# Patient Record
Sex: MALE | Race: WHITE | NOT HISPANIC OR LATINO | Employment: FULL TIME | URBAN - METROPOLITAN AREA
[De-identification: names, ages, dates, MRNs, and addresses within clinical notes are randomized per-mention and may not be internally consistent; named-entity substitution may affect disease eponyms.]

---

## 2018-03-11 ENCOUNTER — RESOLUTE PROFESSIONAL BILLING HOSPITAL PROF FEE (OUTPATIENT)
Dept: HOSPITALIST | Facility: MEDICAL CENTER | Age: 58
End: 2018-03-11
Payer: COMMERCIAL

## 2018-03-11 ENCOUNTER — HOSPITAL ENCOUNTER (OUTPATIENT)
Facility: MEDICAL CENTER | Age: 58
DRG: 418 | End: 2018-03-11
Admitting: HOSPITALIST
Payer: COMMERCIAL

## 2018-03-11 ENCOUNTER — HOSPITAL ENCOUNTER (INPATIENT)
Facility: MEDICAL CENTER | Age: 58
LOS: 3 days | DRG: 418 | End: 2018-03-14
Attending: HOSPITALIST | Admitting: HOSPITALIST
Payer: COMMERCIAL

## 2018-03-11 DIAGNOSIS — G89.18 POSTOPERATIVE PAIN: ICD-10-CM

## 2018-03-11 DIAGNOSIS — K83.1 OBSTRUCTIVE JAUNDICE: Primary | ICD-10-CM

## 2018-03-11 PROBLEM — R10.9 ABDOMINAL PAIN: Status: ACTIVE | Noted: 2018-03-11

## 2018-03-11 PROBLEM — E78.5 DYSLIPIDEMIA: Status: ACTIVE | Noted: 2018-03-11

## 2018-03-11 PROBLEM — E87.6 HYPOKALEMIA: Status: ACTIVE | Noted: 2018-03-11

## 2018-03-11 PROBLEM — F10.20 ALCOHOL DEPENDENCE (HCC): Status: ACTIVE | Noted: 2018-03-11

## 2018-03-11 PROBLEM — I10 HTN (HYPERTENSION): Status: ACTIVE | Noted: 2018-03-11

## 2018-03-11 LAB
ALBUMIN SERPL BCP-MCNC: 3.8 G/DL (ref 3.2–4.9)
ALBUMIN/GLOB SERPL: 1.2 G/DL
ALP SERPL-CCNC: 327 U/L (ref 30–99)
ALT SERPL-CCNC: 235 U/L (ref 2–50)
ANION GAP SERPL CALC-SCNC: 9 MMOL/L (ref 0–11.9)
APTT PPP: 26.4 SEC (ref 24.7–36)
AST SERPL-CCNC: 166 U/L (ref 12–45)
BASOPHILS # BLD AUTO: 0.3 % (ref 0–1.8)
BASOPHILS # BLD: 0.03 K/UL (ref 0–0.12)
BILIRUB SERPL-MCNC: 11 MG/DL (ref 0.1–1.5)
BUN SERPL-MCNC: 12 MG/DL (ref 8–22)
CALCIUM SERPL-MCNC: 9.6 MG/DL (ref 8.5–10.5)
CHLORIDE SERPL-SCNC: 93 MMOL/L (ref 96–112)
CO2 SERPL-SCNC: 32 MMOL/L (ref 20–33)
CREAT SERPL-MCNC: 1.04 MG/DL (ref 0.5–1.4)
EOSINOPHIL # BLD AUTO: 0.11 K/UL (ref 0–0.51)
EOSINOPHIL NFR BLD: 1.1 % (ref 0–6.9)
ERYTHROCYTE [DISTWIDTH] IN BLOOD BY AUTOMATED COUNT: 48.7 FL (ref 35.9–50)
GLOBULIN SER CALC-MCNC: 3.3 G/DL (ref 1.9–3.5)
GLUCOSE SERPL-MCNC: 111 MG/DL (ref 65–99)
HCT VFR BLD AUTO: 44.8 % (ref 42–52)
HGB BLD-MCNC: 15.3 G/DL (ref 14–18)
IMM GRANULOCYTES # BLD AUTO: 0.02 K/UL (ref 0–0.11)
IMM GRANULOCYTES NFR BLD AUTO: 0.2 % (ref 0–0.9)
INR PPP: 1.1 (ref 0.87–1.13)
LIPASE SERPL-CCNC: 25 U/L (ref 11–82)
LYMPHOCYTES # BLD AUTO: 0.79 K/UL (ref 1–4.8)
LYMPHOCYTES NFR BLD: 8.3 % (ref 22–41)
MCH RBC QN AUTO: 32.1 PG (ref 27–33)
MCHC RBC AUTO-ENTMCNC: 34.2 G/DL (ref 33.7–35.3)
MCV RBC AUTO: 94.1 FL (ref 81.4–97.8)
MONOCYTES # BLD AUTO: 0.66 K/UL (ref 0–0.85)
MONOCYTES NFR BLD AUTO: 6.9 % (ref 0–13.4)
NEUTROPHILS # BLD AUTO: 7.96 K/UL (ref 1.82–7.42)
NEUTROPHILS NFR BLD: 83.2 % (ref 44–72)
NRBC # BLD AUTO: 0 K/UL
NRBC BLD-RTO: 0 /100 WBC
PLATELET # BLD AUTO: 95 K/UL (ref 164–446)
PMV BLD AUTO: 11.1 FL (ref 9–12.9)
POTASSIUM SERPL-SCNC: 3.1 MMOL/L (ref 3.6–5.5)
PROT SERPL-MCNC: 7.1 G/DL (ref 6–8.2)
PROTHROMBIN TIME: 13.9 SEC (ref 12–14.6)
RBC # BLD AUTO: 4.76 M/UL (ref 4.7–6.1)
SODIUM SERPL-SCNC: 134 MMOL/L (ref 135–145)
WBC # BLD AUTO: 9.6 K/UL (ref 4.8–10.8)

## 2018-03-11 PROCEDURE — 80053 COMPREHEN METABOLIC PANEL: CPT

## 2018-03-11 PROCEDURE — 770006 HCHG ROOM/CARE - MED/SURG/GYN SEMI*

## 2018-03-11 PROCEDURE — 700101 HCHG RX REV CODE 250: Performed by: HOSPITALIST

## 2018-03-11 PROCEDURE — 85730 THROMBOPLASTIN TIME PARTIAL: CPT

## 2018-03-11 PROCEDURE — 700105 HCHG RX REV CODE 258

## 2018-03-11 PROCEDURE — 83690 ASSAY OF LIPASE: CPT

## 2018-03-11 PROCEDURE — 85610 PROTHROMBIN TIME: CPT

## 2018-03-11 PROCEDURE — 36415 COLL VENOUS BLD VENIPUNCTURE: CPT

## 2018-03-11 PROCEDURE — 85025 COMPLETE CBC W/AUTO DIFF WBC: CPT

## 2018-03-11 PROCEDURE — 99221 1ST HOSP IP/OBS SF/LOW 40: CPT | Performed by: HOSPITALIST

## 2018-03-11 PROCEDURE — 700111 HCHG RX REV CODE 636 W/ 250 OVERRIDE (IP): Performed by: HOSPITALIST

## 2018-03-11 PROCEDURE — 700102 HCHG RX REV CODE 250 W/ 637 OVERRIDE(OP): Performed by: HOSPITALIST

## 2018-03-11 PROCEDURE — A9270 NON-COVERED ITEM OR SERVICE: HCPCS | Performed by: HOSPITALIST

## 2018-03-11 RX ORDER — ASPIRIN 81 MG/1
81 TABLET, CHEWABLE ORAL DAILY
Status: ON HOLD | COMMUNITY
End: 2018-03-11

## 2018-03-11 RX ORDER — ASPIRIN 81 MG/1
81 TABLET, CHEWABLE ORAL DAILY
COMMUNITY
End: 2022-01-31 | Stop reason: SDUPTHER

## 2018-03-11 RX ORDER — PROMETHAZINE HYDROCHLORIDE 25 MG/1
12.5-25 TABLET ORAL EVERY 4 HOURS PRN
Status: DISCONTINUED | OUTPATIENT
Start: 2018-03-11 | End: 2018-03-14 | Stop reason: HOSPADM

## 2018-03-11 RX ORDER — POLYETHYLENE GLYCOL 3350 17 G/17G
1 POWDER, FOR SOLUTION ORAL
Status: DISCONTINUED | OUTPATIENT
Start: 2018-03-11 | End: 2018-03-14 | Stop reason: HOSPADM

## 2018-03-11 RX ORDER — AMOXICILLIN 250 MG
2 CAPSULE ORAL 2 TIMES DAILY
Status: DISCONTINUED | OUTPATIENT
Start: 2018-03-11 | End: 2018-03-14 | Stop reason: HOSPADM

## 2018-03-11 RX ORDER — PROMETHAZINE HYDROCHLORIDE 25 MG/1
12.5-25 SUPPOSITORY RECTAL EVERY 4 HOURS PRN
Status: DISCONTINUED | OUTPATIENT
Start: 2018-03-11 | End: 2018-03-14 | Stop reason: HOSPADM

## 2018-03-11 RX ORDER — ONDANSETRON 4 MG/1
4 TABLET, ORALLY DISINTEGRATING ORAL EVERY 4 HOURS PRN
Status: DISCONTINUED | OUTPATIENT
Start: 2018-03-11 | End: 2018-03-14 | Stop reason: HOSPADM

## 2018-03-11 RX ORDER — CLONIDINE HYDROCHLORIDE 0.1 MG/1
0.2 TABLET ORAL 2 TIMES DAILY
Status: DISCONTINUED | OUTPATIENT
Start: 2018-03-11 | End: 2018-03-14 | Stop reason: HOSPADM

## 2018-03-11 RX ORDER — DILTIAZEM HYDROCHLORIDE 120 MG/1
240 CAPSULE, COATED, EXTENDED RELEASE ORAL DAILY
Status: DISCONTINUED | OUTPATIENT
Start: 2018-03-12 | End: 2018-03-14 | Stop reason: HOSPADM

## 2018-03-11 RX ORDER — OXYCODONE HYDROCHLORIDE 5 MG/1
5 TABLET ORAL
Status: DISCONTINUED | OUTPATIENT
Start: 2018-03-11 | End: 2018-03-14 | Stop reason: HOSPADM

## 2018-03-11 RX ORDER — ATORVASTATIN CALCIUM 20 MG/1
20 TABLET, FILM COATED ORAL NIGHTLY
COMMUNITY

## 2018-03-11 RX ORDER — DEXTROSE AND SODIUM CHLORIDE 5; .9 G/100ML; G/100ML
INJECTION, SOLUTION INTRAVENOUS
Status: ACTIVE
Start: 2018-03-11 | End: 2018-03-11

## 2018-03-11 RX ORDER — CHLORTHALIDONE 25 MG/1
25 TABLET ORAL DAILY
COMMUNITY

## 2018-03-11 RX ORDER — ACETAMINOPHEN 325 MG/1
650 TABLET ORAL EVERY 6 HOURS PRN
Status: DISCONTINUED | OUTPATIENT
Start: 2018-03-11 | End: 2018-03-14 | Stop reason: HOSPADM

## 2018-03-11 RX ORDER — METOPROLOL TARTRATE 100 MG/1
100 TABLET ORAL 2 TIMES DAILY
Status: DISCONTINUED | OUTPATIENT
Start: 2018-03-11 | End: 2018-03-14 | Stop reason: HOSPADM

## 2018-03-11 RX ORDER — ONDANSETRON 2 MG/ML
4 INJECTION INTRAMUSCULAR; INTRAVENOUS EVERY 4 HOURS PRN
Status: DISCONTINUED | OUTPATIENT
Start: 2018-03-11 | End: 2018-03-14 | Stop reason: HOSPADM

## 2018-03-11 RX ORDER — ATORVASTATIN CALCIUM 20 MG/1
20 TABLET, FILM COATED ORAL NIGHTLY
Status: DISCONTINUED | OUTPATIENT
Start: 2018-03-11 | End: 2018-03-14 | Stop reason: HOSPADM

## 2018-03-11 RX ORDER — OMEPRAZOLE 20 MG/1
20 CAPSULE, DELAYED RELEASE ORAL DAILY
COMMUNITY

## 2018-03-11 RX ORDER — LABETALOL HYDROCHLORIDE 5 MG/ML
10 INJECTION, SOLUTION INTRAVENOUS EVERY 4 HOURS PRN
Status: DISCONTINUED | OUTPATIENT
Start: 2018-03-11 | End: 2018-03-14 | Stop reason: HOSPADM

## 2018-03-11 RX ORDER — OXYCODONE HYDROCHLORIDE 5 MG/1
2.5 TABLET ORAL
Status: DISCONTINUED | OUTPATIENT
Start: 2018-03-11 | End: 2018-03-14 | Stop reason: HOSPADM

## 2018-03-11 RX ORDER — DILTIAZEM HYDROCHLORIDE 240 MG/1
240 CAPSULE, COATED, EXTENDED RELEASE ORAL DAILY
COMMUNITY

## 2018-03-11 RX ORDER — DEXTROSE AND SODIUM CHLORIDE 5; .9 G/100ML; G/100ML
INJECTION, SOLUTION INTRAVENOUS
Status: ACTIVE
Start: 2018-03-11 | End: 2018-03-12

## 2018-03-11 RX ORDER — BISACODYL 10 MG
10 SUPPOSITORY, RECTAL RECTAL
Status: DISCONTINUED | OUTPATIENT
Start: 2018-03-11 | End: 2018-03-14 | Stop reason: HOSPADM

## 2018-03-11 RX ORDER — CLONIDINE HYDROCHLORIDE 0.2 MG/1
0.2 TABLET ORAL 2 TIMES DAILY
COMMUNITY
End: 2022-01-13

## 2018-03-11 RX ORDER — SODIUM CHLORIDE AND POTASSIUM CHLORIDE 150; 900 MG/100ML; MG/100ML
INJECTION, SOLUTION INTRAVENOUS CONTINUOUS
Status: DISCONTINUED | OUTPATIENT
Start: 2018-03-11 | End: 2018-03-14 | Stop reason: HOSPADM

## 2018-03-11 RX ORDER — DEXTROSE AND SODIUM CHLORIDE 5; .45 G/100ML; G/100ML
INJECTION, SOLUTION INTRAVENOUS CONTINUOUS
Status: DISCONTINUED | OUTPATIENT
Start: 2018-03-11 | End: 2018-03-11

## 2018-03-11 RX ORDER — OMEPRAZOLE 20 MG/1
20 CAPSULE, DELAYED RELEASE ORAL DAILY
Status: DISCONTINUED | OUTPATIENT
Start: 2018-03-12 | End: 2018-03-14 | Stop reason: HOSPADM

## 2018-03-11 RX ORDER — METOPROLOL TARTRATE 100 MG/1
200 TABLET ORAL 2 TIMES DAILY
COMMUNITY

## 2018-03-11 RX ADMIN — POTASSIUM CHLORIDE AND SODIUM CHLORIDE: 900; 150 INJECTION, SOLUTION INTRAVENOUS at 12:35

## 2018-03-11 RX ADMIN — OXYCODONE HYDROCHLORIDE 5 MG: 5 TABLET ORAL at 16:05

## 2018-03-11 RX ADMIN — HYDROMORPHONE HYDROCHLORIDE 0.25 MG: 10 INJECTION, SOLUTION INTRAMUSCULAR; INTRAVENOUS; SUBCUTANEOUS at 12:36

## 2018-03-11 RX ADMIN — HYDROMORPHONE HYDROCHLORIDE 0.25 MG: 10 INJECTION, SOLUTION INTRAMUSCULAR; INTRAVENOUS; SUBCUTANEOUS at 21:43

## 2018-03-11 RX ADMIN — CLONIDINE HYDROCHLORIDE 0.2 MG: 0.1 TABLET ORAL at 21:31

## 2018-03-11 RX ADMIN — CEFTRIAXONE 2 G: 2 INJECTION, POWDER, FOR SOLUTION INTRAMUSCULAR; INTRAVENOUS at 13:04

## 2018-03-11 RX ADMIN — METRONIDAZOLE 500 MG: 500 INJECTION, SOLUTION INTRAVENOUS at 14:00

## 2018-03-11 RX ADMIN — STANDARDIZED SENNA CONCENTRATE AND DOCUSATE SODIUM 2 TABLET: 8.6; 5 TABLET, FILM COATED ORAL at 21:31

## 2018-03-11 RX ADMIN — METRONIDAZOLE 500 MG: 500 INJECTION, SOLUTION INTRAVENOUS at 21:38

## 2018-03-11 ASSESSMENT — LIFESTYLE VARIABLES
HAVE YOU EVER FELT YOU SHOULD CUT DOWN ON YOUR DRINKING: YES
HAVE PEOPLE ANNOYED YOU BY CRITICIZING YOUR DRINKING: YES
EVER FELT BAD OR GUILTY ABOUT YOUR DRINKING: NO
CONSUMPTION TOTAL: POSITIVE
DOES PATIENT WANT TO STOP DRINKING: YES
EVER_SMOKED: NEVER
TOTAL SCORE: 3
HOW MANY TIMES IN THE PAST YEAR HAVE YOU HAD 5 OR MORE DRINKS IN A DAY: 10
TOTAL SCORE: 3
DOES PATIENT WANT TO TALK TO SOMEONE ABOUT QUITTING: NO
ON A TYPICAL DAY WHEN YOU DRINK ALCOHOL HOW MANY DRINKS DO YOU HAVE: 3
AVERAGE NUMBER OF DAYS PER WEEK YOU HAVE A DRINK CONTAINING ALCOHOL: 6
EVER HAD A DRINK FIRST THING IN THE MORNING TO STEADY YOUR NERVES TO GET RID OF A HANGOVER: YES
TOTAL SCORE: 3
ALCOHOL_USE: YES

## 2018-03-11 ASSESSMENT — PATIENT HEALTH QUESTIONNAIRE - PHQ9
SUM OF ALL RESPONSES TO PHQ9 QUESTIONS 1 AND 2: 0
1. LITTLE INTEREST OR PLEASURE IN DOING THINGS: NOT AT ALL
SUM OF ALL RESPONSES TO PHQ QUESTIONS 1-9: 0
2. FEELING DOWN, DEPRESSED, IRRITABLE, OR HOPELESS: NOT AT ALL

## 2018-03-11 ASSESSMENT — PAIN SCALES - GENERAL
PAINLEVEL_OUTOF10: 6
PAINLEVEL_OUTOF10: 8
PAINLEVEL_OUTOF10: 9
PAINLEVEL_OUTOF10: 8
PAINLEVEL_OUTOF10: 8
PAINLEVEL_OUTOF10: 7

## 2018-03-11 NOTE — PROGRESS NOTES
GI (full consultation note dictated)    Chief Complaint: Epigastric pain    Joshua is a 57 year old  from [place], [place] who complains of acute onset of epigastric pain radiating into the back, nausea, and shaking chills on 3/10/2018 at 2:30 am.  Despite the pain, he worked an 8 hour shift.  He had experienced 2 prior bouts of similar pain, once a month ago and another time 2 months ago, but unlike those episodes, this pain didn't resolve.  Therefore, he went to the ER.  (See details in consultation note).    Impression:  1. Epigastric pain, suspect due to obstructing CBD stone +/- cholecystitis.  2. Jaundice - suspect obstructing CBD stone.  3. Elevated liver enzymes, suspect acute related to CBD stone.  4. Dilated CBD - 11.5 mm per US and CT.  No tumors.  No pancreatic duct dilatation.  5. Thickened GB wall with sludge.  6. Downward trend in platelet count (125k to 95k).  7. Excess alcohol intake (three drinks with 6 oz of Vodka 6 days out of the week).  8. HTN.  9. Dyslipidemia.    Recommendations:  - Agree with antibiotics (metronidazole and ceftriaxone).  - Check INR.  - I will try to schedule an ERCP tomorrow by Dr. Victorino Luis.  Tentatively they gave me a time of 8 am, but that may change depending upon OR availability.

## 2018-03-11 NOTE — CONSULTS
DATE OF SERVICE:  2018    CHIEF COMPLAINT:  Abdominal pain.    HISTORY OF PRESENT ILLNESS:  The patient is a 57-year-old  from   [place], [place] who was feeling well until 03/10/2018 (Saturday night) at 2:30   a.m. when he awoke with acute onset of severe epigastric pain radiating to the   back with nausea and shaking chills.  Despite the pain, he worked in an   8-hour shift.  Unlike two prior bouts of pain, one a month ago and the other   one 2 months ago, this discomfort persisted.  He was trying not to go to the   hospital, but finally gave in and arrived there late last night.  He was found   to be jaundiced with abnormal liver enzymes.  He underwent an ultrasound of   the abdomen and was found to have a thickened gallbladder wall, sludge in the   gallbladder, and a dilated 11.5 mm common bile duct.  A CT scan of the abdomen   and pelvis revealed thickened gallbladder wall and 11.4 mm common bile duct,   but no findings of tumors, pancreatic ductal dilatation, or other pathology to   explain his pain.  They felt he probably had a common bile duct stone and   transferred him here so he could undergo ERCP.    PAST MEDICAL HISTORY:  Hypertension, dyslipidemia.    PAST SURGICAL HISTORY:  Bilateral cataract surgery.    ALLERGIES:  None known.    OUTPATIENT MEDICATIONS:  Aspirin 81 mg daily, Lipitor 20 mg daily,   chlorthalidone 25 mg daily, clonidine 0.2 mg b.i.d., Cardizem- mg daily,   metoprolol 100 mg twice daily and Prilosec 20 mg daily.    SOCIAL HISTORY:  He is  with grown children and works as a crane   .  He admits he drinks too much alcohol.  Six days out of the week, he   will have 3 drinks, but they consist of about 6 ounces of vodka.  He does not   smoke cigarettes, has no history of IV drug use or tattoos.    FAMILY HISTORY:  His father  of a heart attack.  His mother is still   alive, no cancer.    REVIEW OF SYSTEMS:  As per history of present illness, otherwise  comprehensive   review of system was negative.    PHYSICAL EXAMINATION:  VITAL SIGNS:  His temperature is 36.9, pulse 72, respirations 16, blood   pressure 141/86, oxygen saturation 94%.  HEENT:  Mild rubor of his cheeks.  Sclerae are anicteric.  Extraocular   movements intact.  Oral mucosa moist.  Mallampati score 1.  NECK:  No thyromegaly or adenopathy.  LUNGS:  Clear to auscultation.  HEART:  Regular rate and rhythm.  S1, S2.  No murmurs.  ABDOMEN:  Mildly obese with normal bowel sounds.  He has moderate epigastric   tenderness, without guarding or rebound.  I did not appreciate any   hepatosplenomegaly or ascites.  EXTREMITIES:  No edema.  NEUROLOGIC:  Alert and oriented.    LABORATORY DATA:  In [place], his labs showed white count elevation of 12,100,   hemoglobin was 14.7, hematocrit 41.9, MCV 92.3, platelet count of 125,000.    Chemistry panel normal except for sodium 133, potassium 3.1, chloride 94,   glucose 133, total bilirubin 9.2, alkaline phosphatase 408, , ,   lipase was normal at 89.  Urinalysis had 3+ bilirubin.    Here, his white count is down to 9600, hemoglobin and hematocrit are stable,   and his platelet count 95,000.  The chemistry panel now shows his total   bilirubin up to 11, alkaline phosphatase decreased to 327, AST is 166, ALT is   235.  Sodium was 134, potassium 3.1, chloride 93, glucose 111.    IMAGING:  As per history of present illness.    IMPRESSION:  1.  Acute epigastric pain.  Given findings, I suspect he has a distal   obstructing common bile duct stone with possible secondary cholecystitis or   cholangitis.  2.  Jaundice, suspect obstructing common bile duct stone.  3.  Elevated liver enzymes, probably due to acute biliary obstruction.  4.  Dilated common bile duct per ultrasound and the CT scan with no evidence   of tumors or pancreatic ductal dilatation.  5.  Thickened gallbladder wall with sludge.  6.  Downward trend in platelet count (125,000-95,000).  7.  Excess  alcohol intake.  8.  Hypertension.  9.  Dyslipidemia.    The patient tells me that previously he had normal liver tests, but his red   cells were a little bit large and his doctor thought he may drink too much.    However, this acute presentation is most consistent with acute obstruction of   the biliary tree by a common bile duct stone.  Given the rising bilirubin, I   think we should go directly to ERCP.  I do not think MRCP would add to the   diagnosis and might delay therapy.    RECOMMENDATIONS:  1.  Agree with antibiotics (metronidazole, ceftriaxone)  2.  Check INR and if elevated, give vitamin K.  3.  I will schedule an ERCP with possible sphincterotomy and stone extraction   by Dr. Victorino Luis tomorrow.  The OR staff told me 8:00 a.m. was available,   but that will be confirmed in the morning.  I have explained the procedure,   risks, benefits, and alternatives to the patient.  He appears to understand   and wishes to proceed as recommended.    Thank you for allowing me to participate in his care.       ____________________________________     PERLITA KO MD    CMS / EMILY    DD:  03/11/2018 13:25:31  DT:  03/11/2018 16:37:00    D#:  [id number]  Job#:  [id number]

## 2018-03-11 NOTE — PROGRESS NOTES
"[facility] [place] [place] and in the ER under the care of Dr. Veloz.  Pt has a dx of biliary obstruction and common bile duct obstruction.  Dr. Anand, [facility], is the accepting physician.  Pt will be have an Inpatient status and is assigned [facility] room [id number].    No ETA as of yet, as the room has not been cleaned.  When room is cleaned, the [facility] will call [facility] at [phone], and let them know the room is ready.  Pt should be arriving by air.    When pt arrives, the RN should release the \"Signed and Held ADT Order\" and call the on-call admitting physician for orders.  "

## 2018-03-11 NOTE — CARE PLAN
Problem: Safety  Goal: Will remain free from injury  Outcome: PROGRESSING AS EXPECTED  Patient oriented to the unit, educated on use of call light    Problem: Pain Management  Goal: Pain level will decrease to patient's comfort goal  Outcome: PROGRESSING AS EXPECTED  Medicated per MAR for pain of 8/10

## 2018-03-11 NOTE — PROGRESS NOTES
Med rec complete per patient's wife with a list.  Allergies reviewed and updated.  No outpatient antibiotics in the last 30 days.

## 2018-03-11 NOTE — PROGRESS NOTES
Assumed care of patient from [facility].  Patient is alert and oriented times 4, states pain of 7/10, will medicate per MAR.  PIV in the RAC, patent.  Currently on 2L oxygen with saturations in the mid 90s.  Standby assist, demonstrates steady gait, no assistance needed.  Skin intact.  Admitting hospitalist notified of patient arrival, awaiting new orders.  POC discussed for the day, patient oriented to the unit.  Bed is locked and in the lowest position, call light is within reach.  All needs are met at this time, hourly rounding is in place.

## 2018-03-11 NOTE — H&P
CHIEF COMPLAINT:  Abdominal pain.    HISTORY OF PRESENT ILLNESS:  Patient is a 57-year-old male who started to   experience abdominal pain about 2 days ago.  The pain was moderate to severe   to sharp.  It was in his mid and right upper quadrant, radiating to his back.    It was associated with recurrent episodes of nausea and vomiting.  He also   had some intermittent chills.  No documented fever.  He noted dark colored   urine.  He presented to his local emergency room in [place] where he was noted to   have an elevated bilirubin and dilated common bile duct and was transferred   to our facility for higher level of care.  He denies any chest pain or   shortness of breath.  No diarrhea.  No melena or rectal bleeding.  He drinks   about 3 alcoholic beverages daily.  He has a history of hypertension and   dyslipidemia, but no other chronic illnesses.    REVIEW OF SYSTEMS:  As above, otherwise reviewed and negative.    PAST MEDICAL HISTORY:  Significant for:  1.  Hypertension.  2.  Dyslipidemia.    PAST SURGICAL HISTORY:  Cataract surgery.    SOCIAL HISTORY:  He does not smoke.  He drinks 3 alcoholic beverages, 6 days a   week.  He denies illicit drug use.    FAMILY HISTORY:  Positive for cardiovascular disease.    HOME MEDICATIONS:  Aspirin 81 mg daily, Lipitor 20 mg every evening,   chlorthalidone 25 mg daily, clonidine 0.2 mg b.i.d., Cardizem- mg daily,   metoprolol 100 mg twice a day, Prilosec 20 mg.    PHYSICAL EXAMINATION:  GENERAL:  He is alert, oriented x3.  VITAL SIGNS:  He was afebrile.  Blood pressure is 150/92, pulse is 62,   respiratory rate is 18, pulse oximetry 98% on 3 liters.  HEAD AND NECK:  Pupils equal.  Supple neck.  No jugular venous distention.    Oropharynx is clear.  No cervical lymphadenopathy.  HEART:  Regular rate and rhythm, normal S1, S2.  No murmurs, rubs, or gallops.  LUNGS:  Clear with symmetric air entry bilaterally.  No chest wall tenderness.  ABDOMEN:  Soft, mildly distended.  He  has right upper and epigastric   tenderness.  No guarding or rebound.  Bowel sounds are positive.  No   hepatosplenomegaly.  EXTREMITIES:  No edema, no clubbing, no cyanosis.  NEUROLOGIC:  No focal deficits.  SKIN:  He is jaundiced, no rash.    DIAGNOSTICS:  Labs at the outlying facility, white blood cell count 12.1,   hemoglobin 14.7, hematocrit 41.9, platelet count 125.  Sodium 133, potassium   3.1, chloride 94, bicarbonate 30, glucose 130, BUN 14, creatinine 0.9, AST was   279, calcium 8.8, albumin 3.1, protein 7.5, globulin 4.4, total bilirubin was   9.2, alkaline phosphatase 408.  ALT was 346, lipase 89.  Urinalysis reveals   1-3 white blood cells, negative red blood cells, negative nitrites,   urobilinogen was 4.  CT of the abdomen and pelvis revealed gallbladder wall   thickening, correlate for cholecystitis.  The common bile duct is enlarged,   measuring 11.4 mm.    Gallbladder ultrasound revealed gallbladder containing sludge, gallbladder   wall thickening, measuring up to 4.9 mm.  Sonographic Liang sign is positive,   correlate for acalculous cholecystitis.  Common bile duct is enlarged,   measuring up to 11.6 mm.    ASSESSMENT AND PLAN:  1.  Abdominal pain with dilated common bile duct and elevated bilirubin   concerning for choledocholithiasis given associated pain and acute onset,   although no stone was seen on the ultrasound.  2.  Possible early cholangitis given leukocytosis and chills.  3.  Hypertension.  4.  Hypokalemia.  5.  Alcohol dependence.    PLAN:  The patient will be admitted.  He will be started on IV fluids for   hydration.  We will replete his potassium with his maintenance fluids.    We will keep him n.p.o.    We will start him on IV ceftriaxone and Flagyl.    We will recheck his LFTs and CBC and lipase.    We will consult GI.  I spoke with Dr. Miller who will see him for evaluation   for ERCP.    We will use SCDs for DVT prophylaxis at this time as the patient will likely   require  ERCP.    We will continue with his Lipitor.    We will continue with his clonidine, diltiazem, and metoprolol, but we will   hold off on his chlorthalidone at this time.    We will monitor his blood pressure and adjust accordingly.    Patient will likely require more than 2 midnights stay for treatment of his   medical condition.    Patient was counseled on restricting alcohol use.    We will monitor him for any signs of withdrawal.       ____________________________________     MD ALEJANDRA GUERRA / NTS    DD:  03/11/2018 12:14:41  DT:  03/11/2018 12:53:22    D#:  [id number]  Job#:  [id number]

## 2018-03-12 ENCOUNTER — APPOINTMENT (OUTPATIENT)
Dept: RADIOLOGY | Facility: MEDICAL CENTER | Age: 58
DRG: 418 | End: 2018-03-12
Attending: INTERNAL MEDICINE
Payer: COMMERCIAL

## 2018-03-12 PROBLEM — G89.18 POSTOPERATIVE PAIN: Status: ACTIVE | Noted: 2018-03-12

## 2018-03-12 PROBLEM — R73.9 HYPERGLYCEMIA: Status: ACTIVE | Noted: 2018-03-12

## 2018-03-12 PROBLEM — K83.1 OBSTRUCTIVE JAUNDICE: Status: RESOLVED | Noted: 2018-03-11 | Resolved: 2018-03-12

## 2018-03-12 LAB
ALBUMIN SERPL BCP-MCNC: 3.5 G/DL (ref 3.2–4.9)
ALBUMIN/GLOB SERPL: 1.2 G/DL
ALP SERPL-CCNC: 270 U/L (ref 30–99)
ALT SERPL-CCNC: 161 U/L (ref 2–50)
ANION GAP SERPL CALC-SCNC: 8 MMOL/L (ref 0–11.9)
AST SERPL-CCNC: 94 U/L (ref 12–45)
BASOPHILS # BLD AUTO: 0.4 % (ref 0–1.8)
BASOPHILS # BLD: 0.04 K/UL (ref 0–0.12)
BILIRUB SERPL-MCNC: 9 MG/DL (ref 0.1–1.5)
BUN SERPL-MCNC: 10 MG/DL (ref 8–22)
CALCIUM SERPL-MCNC: 9 MG/DL (ref 8.5–10.5)
CHLORIDE SERPL-SCNC: 95 MMOL/L (ref 96–112)
CO2 SERPL-SCNC: 32 MMOL/L (ref 20–33)
CREAT SERPL-MCNC: 0.88 MG/DL (ref 0.5–1.4)
EOSINOPHIL # BLD AUTO: 0.05 K/UL (ref 0–0.51)
EOSINOPHIL NFR BLD: 0.5 % (ref 0–6.9)
ERYTHROCYTE [DISTWIDTH] IN BLOOD BY AUTOMATED COUNT: 49.1 FL (ref 35.9–50)
GLOBULIN SER CALC-MCNC: 3 G/DL (ref 1.9–3.5)
GLUCOSE SERPL-MCNC: 137 MG/DL (ref 65–99)
HCT VFR BLD AUTO: 41.1 % (ref 42–52)
HGB BLD-MCNC: 13.9 G/DL (ref 14–18)
IMM GRANULOCYTES # BLD AUTO: 0.05 K/UL (ref 0–0.11)
IMM GRANULOCYTES NFR BLD AUTO: 0.5 % (ref 0–0.9)
LYMPHOCYTES # BLD AUTO: 0.77 K/UL (ref 1–4.8)
LYMPHOCYTES NFR BLD: 7.7 % (ref 22–41)
MAGNESIUM SERPL-MCNC: 1.8 MG/DL (ref 1.5–2.5)
MCH RBC QN AUTO: 32 PG (ref 27–33)
MCHC RBC AUTO-ENTMCNC: 33.8 G/DL (ref 33.7–35.3)
MCV RBC AUTO: 94.7 FL (ref 81.4–97.8)
MONOCYTES # BLD AUTO: 0.66 K/UL (ref 0–0.85)
MONOCYTES NFR BLD AUTO: 6.6 % (ref 0–13.4)
NEUTROPHILS # BLD AUTO: 8.46 K/UL (ref 1.82–7.42)
NEUTROPHILS NFR BLD: 84.3 % (ref 44–72)
NRBC # BLD AUTO: 0 K/UL
NRBC BLD-RTO: 0 /100 WBC
PHOSPHATE SERPL-MCNC: 2.9 MG/DL (ref 2.5–4.5)
PLATELET # BLD AUTO: 90 K/UL (ref 164–446)
PMV BLD AUTO: 11.9 FL (ref 9–12.9)
POTASSIUM SERPL-SCNC: 3.1 MMOL/L (ref 3.6–5.5)
PROT SERPL-MCNC: 6.5 G/DL (ref 6–8.2)
RBC # BLD AUTO: 4.34 M/UL (ref 4.7–6.1)
SODIUM SERPL-SCNC: 135 MMOL/L (ref 135–145)
WBC # BLD AUTO: 10 K/UL (ref 4.8–10.8)

## 2018-03-12 PROCEDURE — 0FC98ZZ EXTIRPATION OF MATTER FROM COMMON BILE DUCT, VIA NATURAL OR ARTIFICIAL OPENING ENDOSCOPIC: ICD-10-PCS | Performed by: INTERNAL MEDICINE

## 2018-03-12 PROCEDURE — 160203 HCHG ENDO MINUTES - 1ST 30 MINS LEVEL 4: Performed by: INTERNAL MEDICINE

## 2018-03-12 PROCEDURE — 700101 HCHG RX REV CODE 250: Performed by: HOSPITALIST

## 2018-03-12 PROCEDURE — 500066 HCHG BITE BLOCK, ECT: Performed by: INTERNAL MEDICINE

## 2018-03-12 PROCEDURE — 99233 SBSQ HOSP IP/OBS HIGH 50: CPT | Performed by: HOSPITALIST

## 2018-03-12 PROCEDURE — 160035 HCHG PACU - 1ST 60 MINS PHASE I: Performed by: INTERNAL MEDICINE

## 2018-03-12 PROCEDURE — 700102 HCHG RX REV CODE 250 W/ 637 OVERRIDE(OP)

## 2018-03-12 PROCEDURE — 160009 HCHG ANES TIME/MIN: Performed by: INTERNAL MEDICINE

## 2018-03-12 PROCEDURE — BF101ZZ FLUOROSCOPY OF BILE DUCTS USING LOW OSMOLAR CONTRAST: ICD-10-PCS | Performed by: INTERNAL MEDICINE

## 2018-03-12 PROCEDURE — 700101 HCHG RX REV CODE 250

## 2018-03-12 PROCEDURE — 770006 HCHG ROOM/CARE - MED/SURG/GYN SEMI*

## 2018-03-12 PROCEDURE — 700111 HCHG RX REV CODE 636 W/ 250 OVERRIDE (IP): Performed by: HOSPITALIST

## 2018-03-12 PROCEDURE — C1713 ANCHOR/SCREW BN/BN,TIS/BN: HCPCS | Performed by: INTERNAL MEDICINE

## 2018-03-12 PROCEDURE — 160002 HCHG RECOVERY MINUTES (STAT): Performed by: INTERNAL MEDICINE

## 2018-03-12 PROCEDURE — 160208 HCHG ENDO MINUTES - EA ADDL 1 MIN LEVEL 4: Performed by: INTERNAL MEDICINE

## 2018-03-12 PROCEDURE — 700111 HCHG RX REV CODE 636 W/ 250 OVERRIDE (IP)

## 2018-03-12 PROCEDURE — 700102 HCHG RX REV CODE 250 W/ 637 OVERRIDE(OP): Performed by: HOSPITALIST

## 2018-03-12 PROCEDURE — 80053 COMPREHEN METABOLIC PANEL: CPT

## 2018-03-12 PROCEDURE — 85025 COMPLETE CBC W/AUTO DIFF WBC: CPT

## 2018-03-12 PROCEDURE — A9270 NON-COVERED ITEM OR SERVICE: HCPCS

## 2018-03-12 PROCEDURE — 84100 ASSAY OF PHOSPHORUS: CPT

## 2018-03-12 PROCEDURE — A9270 NON-COVERED ITEM OR SERVICE: HCPCS | Performed by: HOSPITALIST

## 2018-03-12 PROCEDURE — 502240 HCHG MISC OR SUPPLY RC 0272: Performed by: INTERNAL MEDICINE

## 2018-03-12 PROCEDURE — 74328 X-RAY BILE DUCT ENDOSCOPY: CPT

## 2018-03-12 PROCEDURE — 110371 HCHG SHELL REV 272: Performed by: INTERNAL MEDICINE

## 2018-03-12 PROCEDURE — 36415 COLL VENOUS BLD VENIPUNCTURE: CPT

## 2018-03-12 PROCEDURE — 160048 HCHG OR STATISTICAL LEVEL 1-5: Performed by: INTERNAL MEDICINE

## 2018-03-12 PROCEDURE — 83735 ASSAY OF MAGNESIUM: CPT

## 2018-03-12 RX ORDER — INDOMETHACIN 50 MG/1
SUPPOSITORY RECTAL
Status: COMPLETED
Start: 2018-03-12 | End: 2018-03-12

## 2018-03-12 RX ORDER — INDOMETHACIN 50 MG/1
100 SUPPOSITORY RECTAL
Status: DISCONTINUED | OUTPATIENT
Start: 2018-03-12 | End: 2018-03-14 | Stop reason: HOSPADM

## 2018-03-12 RX ORDER — ONDANSETRON 4 MG/1
4 TABLET, FILM COATED ORAL EVERY 4 HOURS PRN
Qty: 20 TAB | Refills: 3 | Status: SHIPPED | OUTPATIENT
Start: 2018-03-12 | End: 2018-03-14

## 2018-03-12 RX ORDER — POTASSIUM CHLORIDE 7.45 MG/ML
10 INJECTION INTRAVENOUS
Status: COMPLETED | OUTPATIENT
Start: 2018-03-12 | End: 2018-03-12

## 2018-03-12 RX ORDER — DOCUSATE SODIUM 100 MG/1
100 CAPSULE, LIQUID FILLED ORAL 2 TIMES DAILY
Qty: 30 CAP | Refills: 3 | Status: SHIPPED | OUTPATIENT
Start: 2018-03-12 | End: 2018-03-14

## 2018-03-12 RX ORDER — HYDROCODONE BITARTRATE AND ACETAMINOPHEN 5; 325 MG/1; MG/1
1-2 TABLET ORAL EVERY 4 HOURS PRN
Qty: 30 TAB | Refills: 0 | Status: SHIPPED | OUTPATIENT
Start: 2018-03-12 | End: 2018-03-14

## 2018-03-12 RX ADMIN — METRONIDAZOLE 500 MG: 500 INJECTION, SOLUTION INTRAVENOUS at 21:00

## 2018-03-12 RX ADMIN — POTASSIUM CHLORIDE 10 MEQ: 7.46 INJECTION, SOLUTION INTRAVENOUS at 17:45

## 2018-03-12 RX ADMIN — HYDROMORPHONE HYDROCHLORIDE 0.25 MG: 10 INJECTION, SOLUTION INTRAMUSCULAR; INTRAVENOUS; SUBCUTANEOUS at 07:35

## 2018-03-12 RX ADMIN — OMEPRAZOLE 20 MG: 20 CAPSULE, DELAYED RELEASE ORAL at 11:06

## 2018-03-12 RX ADMIN — DILTIAZEM HYDROCHLORIDE 240 MG: 120 CAPSULE, COATED, EXTENDED RELEASE ORAL at 11:06

## 2018-03-12 RX ADMIN — STANDARDIZED SENNA CONCENTRATE AND DOCUSATE SODIUM 2 TABLET: 8.6; 5 TABLET, FILM COATED ORAL at 11:06

## 2018-03-12 RX ADMIN — METRONIDAZOLE 500 MG: 500 INJECTION, SOLUTION INTRAVENOUS at 13:53

## 2018-03-12 RX ADMIN — CEFTRIAXONE 2 G: 2 INJECTION, POWDER, FOR SOLUTION INTRAMUSCULAR; INTRAVENOUS at 12:02

## 2018-03-12 RX ADMIN — INDOMETHACIN 100 MG: 50 SUPPOSITORY RECTAL at 10:04

## 2018-03-12 RX ADMIN — ONDANSETRON HYDROCHLORIDE 4 MG: 2 INJECTION, SOLUTION INTRAMUSCULAR; INTRAVENOUS at 05:42

## 2018-03-12 RX ADMIN — POTASSIUM CHLORIDE 10 MEQ: 7.46 INJECTION, SOLUTION INTRAVENOUS at 12:37

## 2018-03-12 RX ADMIN — METOPROLOL TARTRATE 100 MG: 100 TABLET, FILM COATED ORAL at 11:06

## 2018-03-12 RX ADMIN — POTASSIUM CHLORIDE AND SODIUM CHLORIDE: 900; 150 INJECTION, SOLUTION INTRAVENOUS at 17:46

## 2018-03-12 RX ADMIN — CLONIDINE HYDROCHLORIDE 0.2 MG: 0.1 TABLET ORAL at 11:07

## 2018-03-12 RX ADMIN — OXYCODONE HYDROCHLORIDE 5 MG: 5 TABLET ORAL at 05:40

## 2018-03-12 RX ADMIN — ATORVASTATIN CALCIUM 20 MG: 20 TABLET, FILM COATED ORAL at 21:04

## 2018-03-12 RX ADMIN — POTASSIUM CHLORIDE AND SODIUM CHLORIDE: 900; 150 INJECTION, SOLUTION INTRAVENOUS at 01:32

## 2018-03-12 RX ADMIN — POTASSIUM CHLORIDE 10 MEQ: 7.46 INJECTION, SOLUTION INTRAVENOUS at 16:10

## 2018-03-12 RX ADMIN — POTASSIUM CHLORIDE 10 MEQ: 7.46 INJECTION, SOLUTION INTRAVENOUS at 15:15

## 2018-03-12 RX ADMIN — CLONIDINE HYDROCHLORIDE 0.2 MG: 0.1 TABLET ORAL at 21:05

## 2018-03-12 RX ADMIN — HYDROMORPHONE HYDROCHLORIDE 0.25 MG: 10 INJECTION, SOLUTION INTRAMUSCULAR; INTRAVENOUS; SUBCUTANEOUS at 03:24

## 2018-03-12 RX ADMIN — METRONIDAZOLE 500 MG: 500 INJECTION, SOLUTION INTRAVENOUS at 05:45

## 2018-03-12 RX ADMIN — METOPROLOL TARTRATE 100 MG: 100 TABLET, FILM COATED ORAL at 21:05

## 2018-03-12 RX ADMIN — POTASSIUM CHLORIDE AND SODIUM CHLORIDE: 900; 150 INJECTION, SOLUTION INTRAVENOUS at 21:00

## 2018-03-12 ASSESSMENT — PAIN SCALES - GENERAL
PAINLEVEL_OUTOF10: 10
PAINLEVEL_OUTOF10: 2
PAINLEVEL_OUTOF10: 0
PAINLEVEL_OUTOF10: 9
PAINLEVEL_OUTOF10: 0
PAINLEVEL_OUTOF10: 10
PAINLEVEL_OUTOF10: 2
PAINLEVEL_OUTOF10: 0
PAINLEVEL_OUTOF10: 0

## 2018-03-12 ASSESSMENT — ENCOUNTER SYMPTOMS
VOMITING: 0
SHORTNESS OF BREATH: 0
COUGH: 0
DIZZINESS: 0
BACK PAIN: 0
BLURRED VISION: 0
FEVER: 1
EYE PAIN: 0
ABDOMINAL PAIN: 1
DEPRESSION: 0
HEADACHES: 0
TINGLING: 0
SORE THROAT: 0
NAUSEA: 1
INSOMNIA: 0
CHILLS: 1
NECK PAIN: 0
PALPITATIONS: 0

## 2018-03-12 NOTE — PROGRESS NOTES
[facility]    Date of Service: 3/12/2018    Chief Complaint  57 y.o. male admitted 3/11/2018 with abdominal pain. He has been found to have choledocholithiasis s/p ercp and stone extraction.     Interval Problem Update  Had ercp today.   Pain significant over night.     Consultants/Specialty  GI  Called surgery today.     Disposition  Needs cholecystectomy.         Review of Systems   Constitutional: Positive for chills, fever and malaise/fatigue.   HENT: Negative for sore throat.    Eyes: Negative for blurred vision and pain.   Respiratory: Negative for cough and shortness of breath.    Cardiovascular: Negative for chest pain and palpitations.   Gastrointestinal: Positive for abdominal pain and nausea. Negative for vomiting.   Genitourinary: Negative for dysuria and urgency.   Musculoskeletal: Negative for back pain and neck pain.   Skin: Negative for itching and rash.   Neurological: Negative for dizziness, tingling and headaches.   Psychiatric/Behavioral: Negative for depression. The patient does not have insomnia.    All other systems reviewed and are negative.     Physical Exam  Laboratory/Imaging   Hemodynamics  Temp (24hrs), Av.1 °C (98.7 °F), Min:36.7 °C (98 °F), Max:37.5 °C (99.5 °F)   Temperature: 37.3 °C (99.2 °F)  Pulse  Av.4  Min: 69  Max: 80 Heart Rate (Monitored): 77  Blood Pressure: 160/75, NIBP: 144/86      Respiratory      Respiration: 15, Pulse Oximetry: 95 %             Fluids    Intake/Output Summary (Last 24 hours) at 18 1140  Last data filed at 18 1030   Gross per 24 hour   Intake             2930 ml   Output                0 ml   Net             2930 ml       Nutrition  Orders Placed This Encounter   Procedures   • DIET ORDER     Standing Status:   Standing     Number of Occurrences:   1     Order Specific Question:   Diet:     Answer:   Clear Liquids - No Red Foods [12]     Physical Exam   Constitutional: He is oriented to person, place, and time.  He appears well-developed and well-nourished. No distress.   HENT:   Right Ear: External ear normal.   Left Ear: External ear normal.   Nose: Nose normal.   Eyes: Right eye exhibits no discharge. Left eye exhibits no discharge. No scleral icterus.   Neck: No JVD present. No tracheal deviation present.   Cardiovascular: Normal rate, normal heart sounds and intact distal pulses.    No murmur heard.  Cap refill 2 sec   Pulmonary/Chest: Effort normal and breath sounds normal. No respiratory distress. He has no wheezes. He has no rales.   Abdominal: Soft. Bowel sounds are normal. He exhibits no distension. There is tenderness. There is no guarding.   Musculoskeletal: He exhibits no edema or tenderness.   Neurological: He is alert and oriented to person, place, and time.   Skin: Skin is warm and dry. He is not diaphoretic. No erythema.   Normal skin color   Psychiatric: He has a normal mood and affect. His behavior is normal.   Nursing note and vitals reviewed.      Recent Labs      03/11/18   1157  03/12/18   0130   WBC  9.6  10.0   RBC  4.76  4.34*   HEMOGLOBIN  15.3  13.9*   HEMATOCRIT  44.8  41.1*   MCV  94.1  94.7   MCH  32.1  32.0   MCHC  34.2  33.8   RDW  48.7  49.1   PLATELETCT  95*  90*   MPV  11.1  11.9     Recent Labs      03/11/18   1157  03/12/18   0130   SODIUM  134*  135   POTASSIUM  3.1*  3.1*   CHLORIDE  93*  95*   CO2  32  32   GLUCOSE  111*  137*   BUN  12  10   CREATININE  1.04  0.88   CALCIUM  9.6  9.0     Recent Labs      03/11/18   1157   APTT  26.4   INR  1.10                  Assessment/Plan     * Obstructive jaundice   Assessment & Plan    Ercp today. Trend bili  Cholecystectomy per surgery recs  Iv abx  Pain control          Hyperglycemia   Assessment & Plan    Reactive?  Check a1c and trend.         Hypokalemia   Assessment & Plan    Trend and replace. Check mag.         Dyslipidemia   Assessment & Plan    No meds for now given LFT issues.         HTN (hypertension)   Assessment & Plan     Continue meds and trend bp        Alcohol dependence (CMS-HCC)   Assessment & Plan    No withdrawal noted.   Trend exam  ciwa prn            Quality-Core Measures

## 2018-03-12 NOTE — PROGRESS NOTES
AA&Ox4. VSS on 2L NC. Denies SOB.  Reporting 9/10 pain. Medicated per MAR.   NPO as of midnight.   + void. LBM PTA.  Pt is upself.   All needs met at this time. Call light within reach. Pt calls appropriately.

## 2018-03-12 NOTE — CARE PLAN
Problem: Communication  Goal: The ability to communicate needs accurately and effectively will improve  Outcome: PROGRESSING AS EXPECTED  Education provided on importance of using call light to alert staff of patient needs. Pt demonstrates understanding by using call light appropriately.    Problem: Safety  Goal: Will remain free from falls  Outcome: PROGRESSING AS EXPECTED  Treaded slipper socks worn. Bed locked and in lowest position. Patient remains free from falls.

## 2018-03-12 NOTE — PROGRESS NOTES
Pt arrived back to T432-2 from PACU. VSS. Minimal complaints of pain. States feeling much better at this time.  Pt A&Ox4, sitting at edge of bed.  Tolerating clear diet, denies n/v. + bowel sounds, - flatus, LBM prior to procedure.  Saturating >90% on 1L.  Pt ambulates self with steady gait.  Updated on plan of care. Safety education provided. Bed locked in low. Call light within reach. Rounding in place.

## 2018-03-12 NOTE — PROCEDURES
Pre-procedure Diagnoses:   Biliary tract imaging abnormality [R93.2]   Obstructive jaundice [K83.8]   Acute epigastric pain [R10.13]   Post-procedure Diagnoses:   Calculus of bile duct with acute cholangitis with obstruction [K80.33]   Endoscopic Procedures Performed:   ENDOSCOPIC RETROGRADE CHOLANGIOPANCREATOGRAPHY (ERCP) WITH REMOVAL OF STONES FROM BILIARY DUCTS [36483 (CPT®)]   ERCP BILIARY SPHINCTEROTOMY [46955 (CPT®)]   CHOLANGIOGRAM, X-RAY FOR BILE DUCT ENDOSCOPY [30533 (CPT®)]     Endoscopist: Victorino Luis MD, [facility], [facility]    Anesthesiologist: Dr. Sven Messina    Premedications: already on ceftriaxone & metronidazole    Consent: Risks, benefits, and alternatives were discussed with patient and wife (Caitlin).  Consenting persons were given an opportunity to ask questions and discuss other options.  Risks including but not limited to pancreatitis, contrast reaction, radiation exposure, retained choledocholithiasis, possible need for temporary stent placement.  perforation, infection, bleeding, missed lesion(s), cardiac and/or pulmonary event, aspiration, stroke, possible need for surgery and/or interventional radiology, hospitalization possibly prolonged, discomfort, unsuccessful and/or incomplete procedure, indefinite diagnosis, ineffective therapy and/or persistent symptoms, possible need for repeat procedures and/or additional testings, damage to adjacent organs and/or vascular structures, medication reaction, disability, death, and other adverse events possibly life-threatening.  I made it clear to patient and his wife that a cholecystectomy is a surgery and this is separate for what I would be performing, and that a surgeon would need to discuss this with patient after ERCP. Discussion was undertaken with Layman's terms.  Consenting persons stated understanding and acceptance of these risks, and wished to proceed.  Informed consent was given in clear state of mind.     Endoscopic procedures in detail:  "  ERCP scope was inserted from mouth to 2nd portion of the duodenum.  Biliary duct was selectively cannulated on the third attempt, successful free cannulation was achieved.  Cholangiogram was injected and completed.  Biliary sphincterotomy was performed with a bow papillotome with subsequent balloon common bile duct stones extraction, clearance was complete without evidence of any residual common bile duct stones on completion cholangiogram. The C-arm was moved and rotated on rainbow axis to optimize imaging of intrahepatic biliary systems in different angles to avoid missing lesions/strictures with ductal overlap and/or image angulation. The balloon was inflated within the right and left intrahepatic ducts and dragged through the entire length of the bile duct out the biliary os in order to minimize risk of retained stones. The ERCP scope was removed from duodenum to also image the common bile duct \"behind\" the ERCP scope.  Of note, no radiologist was present to help obtain nor read ERCP images.  I was the sole physician who obtained and performed interpretation of static and dynamic ERCP fluoroscopic x-ray images, no over-read was requested from the radiologist nor was it necessary.  There was suction of insufflated air and stomach fluid contents upon removal.    Procedure times:  - In-room 08:54  - Start 09:06  - Completed 09:22    Endoscopic Retrograde Cholangiopancreatography (ERCP) Findings:  - Ampulla of Vater: located in 2nd portion of duodenum, edematous consistent with choledocholithiasis  - Cholangiogram: distal common bile duct stones with upstream ductal dilation to 1.2cm, no biliary stricture nor primary sclerosing cholangitis. Cystic duct did not opacify.  - Pancreatogram:  Intentionally not cannulated nor injected.  - Therapy: Biliary sphincterotomy performed with subsequent common biliary duct stones extraction, 9 mm yellowish faceted firm stone and biliary sludge with pus.    Impression: " Choledocholithiasis with obstruction and cholangitis treated by biliary sphincterotomy with common bile duct stones extraction, clearance complete.    Recommendations:   1.  Routine post-endoscopy anesthesia recovery care.  Discharge patient when he is awake, alert and comfortable, when recovery riteria are met.  Aspiration and fall precautions x 24 hours.   2. Clear liquid diet  3. Lap patricia this hospitalization   4. I spoke to patient, wife and recovery nurse about impression, diagnosis and recommendations.

## 2018-03-12 NOTE — CONSULTS
General Surgery Consult    Date of Service: 3/12/2018    Consulting Physician: James Dill M.D. [facility]    -------------------------------------------------------------------------------------------------    Chief complaint: abdominal pain    HPI: This is a 57 y.o. male with choledocholithiasis who underwent ERCP and duct decompression this AM. Now pain free. Reports 2 previous episodes but those resolved spontaneously.      No past medical history on file.    No past surgical history on file.    Current Facility-Administered Medications   Medication Dose Route Frequency Provider Last Rate Last Dose   • indomethacin (INDOCIN) suppository 100 mg  100 mg Rectal Post-Op once Victorino Luis M.D.       • potassium chloride in water (KCL) ivpb 10 mEq  10 mEq Intravenous Q HOUR Damion Murguia M.D. 0 mL/hr at 03/12/18 1259 10 mEq at 03/12/18 1506   • senna-docusate (PERICOLACE or SENOKOT S) 8.6-50 MG per tablet 2 Tab  2 Tab Oral BID Gregory Aguirre M.D.   2 Tab at 03/12/18 1106    And   • polyethylene glycol/lytes (MIRALAX) PACKET 1 Packet  1 Packet Oral QDAY PRN Gregory Aguirre M.D.        And   • magnesium hydroxide (MILK OF MAGNESIA) suspension 30 mL  30 mL Oral QDAY PRN Gregory Aguirre M.D.        And   • bisacodyl (DULCOLAX) suppository 10 mg  10 mg Rectal QDAY PRN Gregory Aguirre M.D.       • ondansetron (ZOFRAN) syringe/vial injection 4 mg  4 mg Intravenous Q4HRS PRN Gregory Aguirre M.D.   4 mg at 03/12/18 0542   • ondansetron (ZOFRAN ODT) dispertab 4 mg  4 mg Oral Q4HRS PRN Gregory Aguirre M.D.       • promethazine (PHENERGAN) tablet 12.5-25 mg  12.5-25 mg Oral Q4HRS PRN Gregory Aguirre M.D.       • promethazine (PHENERGAN) suppository 12.5-25 mg  12.5-25 mg Rectal Q4HRS PRN Gregory Aguirre M.D.       • prochlorperazine (COMPAZINE) injection 5-10 mg  5-10 mg Intravenous Q4HRS PRN Gregory Aguirre M.D.       • labetalol (NORMODYNE,TRANDATE) injection  10 mg  10 mg Intravenous Q4HRS PRN Gregory Aguirre M.D.       • acetaminophen (TYLENOL) tablet 650 mg  650 mg Oral Q6HRS PRN Gregory Aguirre M.D.       • oxyCODONE immediate-release (ROXICODONE) tablet 2.5 mg  2.5 mg Oral Q3HRS PRN Gregory Aguirre M.D.   Stopped at 03/12/18 0536    And   • oxyCODONE immediate-release (ROXICODONE) tablet 5 mg  5 mg Oral Q3HRS PRN Gregory Aguirre M.D.   5 mg at 03/12/18 0540    And   • HYDROmorphone (DILAUDID) injection 0.25 mg  0.25 mg Intravenous Q3HRS PRN Gregory Aguirre M.D.   0.25 mg at 03/12/18 0735   • cefTRIAXone (ROCEPHIN) syringe 2 g  2 g Intravenous Q24HRS Gregory Aguirre M.D.   2 g at 03/12/18 1202   • metroNIDAZOLE (FLAGYL) IVPB 500 mg  500 mg Intravenous Q8HRS Gregory Aguirre M.D.   Stopped at 03/12/18 1453   • 0.9 % NaCl with KCl 20 mEq infusion   Intravenous Continuous Gregory Aguirre M.D. 100 mL/hr at 03/12/18 0132     • atorvastatin (LIPITOR) tablet 20 mg  20 mg Oral Nightly Gregory Aguirre M.D.   Stopped at 03/11/18 2132   • cloNIDine (CATAPRES) tablet 0.2 mg  0.2 mg Oral BID Gregory Aguirre M.D.   0.2 mg at 03/12/18 1107   • DILTIAZem CD (CARDIZEM CD) capsule 240 mg  240 mg Oral DAILY Gregory Aguirre M.D.   240 mg at 03/12/18 1106   • metoprolol (LOPRESSOR) tablet 100 mg  100 mg Oral BID Gregory Aguirre M.D.   100 mg at 03/12/18 1106   • omeprazole (PRILOSEC) capsule 20 mg  20 mg Oral DAILY Gregory Aguirre M.D.   20 mg at 03/12/18 1106       Social History     Social History   • Marital status:      Spouse name: N/A   • Number of children: N/A   • Years of education: N/A     Occupational History   • Not on file.     Social History Main Topics   • Smoking status: Not on file   • Smokeless tobacco: Not on file   • Alcohol use Not on file   • Drug use: Unknown   • Sexual activity: Not on file     Other Topics Concern   • Not on file     Social History Narrative   • No narrative on file       No  "family history on file.    Allergies:  Lisinopril    Review of Systems:  Constitutional: Negative for fever, chills, weight loss,   HENT:   Negative for hearing loss or tinnitus    Eyes:    Negative for blurred vision, double vision, or loss of vision  Respiratory:  Negative for cough, hemoptysis, or wheezing    Cardiac:  Negative for chest pain or palpitations or orthopnea  Vascular:  Negative for claudication or rest pain   Gastrointestinal: As per HPI   Genitourinary: Negative for dysuria, frequency, or hematuria   Musculoskeletal: Negative for myalgias, back pain, or joint pain  Skin:   Negative for itching or rash  Neurological:  Negative for dizziness, headaches, or tremors     Negative for speech disturbance     Negative for extremity weakness or paresthesias  Endo/Heme:  Negative for easy bruising or bleeding  Psychiatric:  Negative for depression, suicidal ideas, or hallucinations    Physical Exam:  Blood pressure 142/92, pulse 76, temperature 36.8 °C (98.3 °F), resp. rate 15, height 1.803 m (5' 11\"), weight 99.1 kg (218 lb 7.6 oz), SpO2 95 %.    Constitutional: Alert, oriented, no acute distress  HEENT:  Normocephalic and atraumatic, EOMI  Neck:   Supple, no JVD,   Cardiovascular: Regular rate and rhythm,   Pulmonary:  Good air entry bilaterally,   Abdominal:  Soft,  Non-distended      non-tender to palpation      No rebound/guarding  Musculoskeletal: No edema, no tenderness  Neurological:  CN II-XII grossly intact, no focal deficits  Skin:   Skin is warm and dry. No rash noted.  Psychiatric:  Normal mood and affect.    Labs:  Recent Labs      03/11/18   1157  03/12/18   0130   WBC  9.6  10.0   RBC  4.76  4.34*   HEMOGLOBIN  15.3  13.9*   HEMATOCRIT  44.8  41.1*   MCV  94.1  94.7   MCH  32.1  32.0   MCHC  34.2  33.8   RDW  48.7  49.1   PLATELETCT  95*  90*   MPV  11.1  11.9     Recent Labs      03/11/18   1157  03/12/18   0130   SODIUM  134*  135   POTASSIUM  3.1*  3.1*   CHLORIDE  93*  95*   CO2  32  32 "   GLUCOSE  111*  137*   BUN  12  10   CREATININE  1.04  0.88   CALCIUM  9.6  9.0     Recent Labs      03/11/18   1157   APTT  26.4   INR  1.10     Recent Labs      03/11/18   1157  03/12/18   0130   ASTSGOT  166*  94*   ALTSGPT  235*  161*   TBILIRUBIN  11.0*  9.0*   ALKPHOSPHAT  327*  270*   GLOBULIN  3.3  3.0   INR  1.10   --        Assessment: This is a 57 y.o. male with choledocholithiasis s/p decompression by ERCP.    Plan:  OR tomorrow for lap patricia    I explained the operation, alternatives, and potential risks, including but not limited to bleeding, infection, injury to organs or intestines, possible exposure to xray and contrast, possible need for blood transfusion, possible need for open incision, risks of anesthesia, and also global risks such as stroke, heart attack, blood clots, and potentially not surviving the operation or the recovery.  All questions were answered. Patient understands and agrees to proceed.      James Dill M.D.  [facility]  [phone]  Cell: [phone]

## 2018-03-12 NOTE — PROGRESS NOTES
Discharge Instructions: Laparoscopic Cholecystectomy  ==========================================    1. DIET: Upon discharge from the hospital you may resume your normal preoperative diet. Depending on how you are feeling and whether you have nausea or not, you may wish to stay with a bland diet for the first few days. However, you can advance this as quickly as you feel ready.    2. ACTIVITIES: After discharge from the hospital, you may resume routine activities. However, there should be no heavy lifting (greater than 15 pounds) and no strenuous activities until after your follow-up visit. Otherwise, routine activities of daily living are acceptable.    3. DRIVING: You may drive whenever you are off pain medications and are able to perform the activities needed to drive, i.e. turning, bending, twisting, etc.    4. BATHING: OK to shower starting one day after surgery.  The incisions are covered with skin glue which is waterproof.  It will start to peel off in 5-7 days which is normal.  Avoid submerging the incisions in water (tub, bath, pool) for at least a week.     5. BOWEL FUNCTION: A few patients, after this operation, will develop either frequent or loose stools after meals. This usually corrects itself after a few days to a few weeks. If this occurs, do not worry; it is not unusual and will resolve. Much more common than loose stools, is constipation. The combination of pain medication and decreased activity level can cause constipation in otherwise normal patients. If you feel this is occurring, take a laxative (Milk of Magnesia, Ex-Lax, Senokot, etc.) until the problem has resolved.    6. PAIN MEDICATION: You will be given a prescription for pain medication at discharge. Please take these as directed. It is important to remember not to take medications on an empty stomach as this may cause nausea.  You can transition from the prescription-strength pain medication to over-the-counter medications as your pain  improves, such as tylenol, ibuprofen, or Aleve.    7.CALL IF YOU HAVE: (1) Fevers to more than 101.5 F, (2) Unusual chest or leg pain, (3) Drainage or fluid from incision that may be foul smelling, increased tenderness or soreness at the wound or the wound edges are no longer together, redness or swelling at the incision site. Please do not hesitate to call with any other questions.     8. APPOINTMENT: Contact our office at [phone] to schedule a follow-up appointment as needed if concerns arise following your procedure.    If you have any additional questions, please do not hesitate to call the office and speak to either myself or the physician on call.    Office address:  [address] [place] [address]  [phone]    James Dill M.D.  [facility]    Work Excuse after Surgery  ____________________________    3/13/2018      To Whom it May Concern:     Joshua Mckeon underwent surgery at [facility] on 3/13/2018.    He is cleared to return to work 4 days after his operation.    He is restricted to light activity for 3 weeks from the date of surgery.  During that time, he cannot lift over 15lbs, and must avoid strenuous activity, running, repetitive bending or twisting, or similar activities.  After this initial recovery period, he can return to full range of activities including heavy lifting.    If he can not return to work on light-duty restrictions, then I will need to keep him off of work for the entire 3 weeks to avoid tearing his incisions.    If there are any questions or concerns, please contact my office at [phone].    Thank you.      James Dill MD  General and Vascular Surgery  [facility]  [phone]

## 2018-03-12 NOTE — PROGRESS NOTES
Patient seen and examined before going back to [facility] for ERCP sphincterotomy with common bile duct stones extraction and possible temporary stent placement under anesthesia.  Appropriate indication of acute epigastric pain with obstructive jaundice and dilated common bile duct per ultrasound and CT scan suggestive of choledocholithiasis with obstruction and cholangitis.  Risks, benefits, and alternatives were discussed with patient and wife (Caitlin).  Consenting persons were given an opportunity to ask questions and discuss other options.  Risks including but not limited to pancreatitis, contrast reaction, radiation exposure, retained choledocholithiasis, possible need for temporary stent placement.  perforation, infection, bleeding, missed lesion(s), cardiac and/or pulmonary event, aspiration, stroke, possible need for surgery and/or interventional radiology, hospitalization possibly prolonged, discomfort, unsuccessful and/or incomplete procedure, indefinite diagnosis, ineffective therapy and/or persistent symptoms, possible need for repeat procedures and/or additional testings, damage to adjacent organs and/or vascular structures, medication reaction, disability, death, and other adverse events possibly life-threatening.  I made it clear to patient and his wife that a cholecystectomy is a surgery and this is separate for what I would be performing, and that a surgeon would need to discuss this with patient after ERCP. Discussion was undertaken with Layman's terms.  Consenting persons stated understanding and acceptance of these risks, and wished to proceed.  Informed consent was given in clear state of mind.

## 2018-03-13 PROBLEM — K80.50 CHOLEDOCHOLITHIASIS: Status: ACTIVE | Noted: 2018-03-13

## 2018-03-13 LAB
ALBUMIN SERPL BCP-MCNC: 3.4 G/DL (ref 3.2–4.9)
ALBUMIN/GLOB SERPL: 1 G/DL
ALP SERPL-CCNC: 259 U/L (ref 30–99)
ALT SERPL-CCNC: 124 U/L (ref 2–50)
ANION GAP SERPL CALC-SCNC: 6 MMOL/L (ref 0–11.9)
ANION GAP SERPL CALC-SCNC: 7 MMOL/L (ref 0–11.9)
AST SERPL-CCNC: 65 U/L (ref 12–45)
BASOPHILS # BLD AUTO: 0.2 % (ref 0–1.8)
BASOPHILS # BLD: 0.02 K/UL (ref 0–0.12)
BILIRUB SERPL-MCNC: 4.3 MG/DL (ref 0.1–1.5)
BUN SERPL-MCNC: 10 MG/DL (ref 8–22)
BUN SERPL-MCNC: 11 MG/DL (ref 8–22)
CALCIUM SERPL-MCNC: 8.6 MG/DL (ref 8.5–10.5)
CALCIUM SERPL-MCNC: 9.3 MG/DL (ref 8.5–10.5)
CHLORIDE SERPL-SCNC: 100 MMOL/L (ref 96–112)
CHLORIDE SERPL-SCNC: 98 MMOL/L (ref 96–112)
CO2 SERPL-SCNC: 32 MMOL/L (ref 20–33)
CO2 SERPL-SCNC: 32 MMOL/L (ref 20–33)
CREAT SERPL-MCNC: 0.67 MG/DL (ref 0.5–1.4)
CREAT SERPL-MCNC: 0.74 MG/DL (ref 0.5–1.4)
EOSINOPHIL # BLD AUTO: 0 K/UL (ref 0–0.51)
EOSINOPHIL NFR BLD: 0 % (ref 0–6.9)
ERYTHROCYTE [DISTWIDTH] IN BLOOD BY AUTOMATED COUNT: 47.5 FL (ref 35.9–50)
EST. AVERAGE GLUCOSE BLD GHB EST-MCNC: 126 MG/DL
GLOBULIN SER CALC-MCNC: 3.4 G/DL (ref 1.9–3.5)
GLUCOSE SERPL-MCNC: 158 MG/DL (ref 65–99)
GLUCOSE SERPL-MCNC: 176 MG/DL (ref 65–99)
HBA1C MFR BLD: 6 % (ref 0–5.6)
HCT VFR BLD AUTO: 40.2 % (ref 42–52)
HGB BLD-MCNC: 13.7 G/DL (ref 14–18)
IMM GRANULOCYTES # BLD AUTO: 0.04 K/UL (ref 0–0.11)
IMM GRANULOCYTES NFR BLD AUTO: 0.5 % (ref 0–0.9)
LYMPHOCYTES # BLD AUTO: 0.58 K/UL (ref 1–4.8)
LYMPHOCYTES NFR BLD: 7 % (ref 22–41)
MAGNESIUM SERPL-MCNC: 1.8 MG/DL (ref 1.5–2.5)
MCH RBC QN AUTO: 32 PG (ref 27–33)
MCHC RBC AUTO-ENTMCNC: 34.1 G/DL (ref 33.7–35.3)
MCV RBC AUTO: 93.9 FL (ref 81.4–97.8)
MONOCYTES # BLD AUTO: 0.46 K/UL (ref 0–0.85)
MONOCYTES NFR BLD AUTO: 5.5 % (ref 0–13.4)
NEUTROPHILS # BLD AUTO: 7.24 K/UL (ref 1.82–7.42)
NEUTROPHILS NFR BLD: 86.8 % (ref 44–72)
NRBC # BLD AUTO: 0 K/UL
NRBC BLD-RTO: 0 /100 WBC
PLATELET # BLD AUTO: 86 K/UL (ref 164–446)
PMV BLD AUTO: 12.1 FL (ref 9–12.9)
POTASSIUM SERPL-SCNC: 3 MMOL/L (ref 3.6–5.5)
POTASSIUM SERPL-SCNC: 3.3 MMOL/L (ref 3.6–5.5)
PROT SERPL-MCNC: 6.8 G/DL (ref 6–8.2)
RBC # BLD AUTO: 4.28 M/UL (ref 4.7–6.1)
SODIUM SERPL-SCNC: 137 MMOL/L (ref 135–145)
SODIUM SERPL-SCNC: 138 MMOL/L (ref 135–145)
WBC # BLD AUTO: 8.3 K/UL (ref 4.8–10.8)

## 2018-03-13 PROCEDURE — 501399 HCHG SPECIMAN BAG, ENDO CATC: Performed by: SURGERY

## 2018-03-13 PROCEDURE — 88304 TISSUE EXAM BY PATHOLOGIST: CPT

## 2018-03-13 PROCEDURE — 770006 HCHG ROOM/CARE - MED/SURG/GYN SEMI*

## 2018-03-13 PROCEDURE — 80048 BASIC METABOLIC PNL TOTAL CA: CPT

## 2018-03-13 PROCEDURE — 501582 HCHG TROCAR, THRD BLADED: Performed by: SURGERY

## 2018-03-13 PROCEDURE — 500868 HCHG NEEDLE, SURGI(VARES): Performed by: SURGERY

## 2018-03-13 PROCEDURE — A9270 NON-COVERED ITEM OR SERVICE: HCPCS | Performed by: INTERNAL MEDICINE

## 2018-03-13 PROCEDURE — 502571 HCHG PACK, LAP CHOLE: Performed by: SURGERY

## 2018-03-13 PROCEDURE — 93010 ELECTROCARDIOGRAM REPORT: CPT | Performed by: INTERNAL MEDICINE

## 2018-03-13 PROCEDURE — 700111 HCHG RX REV CODE 636 W/ 250 OVERRIDE (IP)

## 2018-03-13 PROCEDURE — 700111 HCHG RX REV CODE 636 W/ 250 OVERRIDE (IP): Performed by: HOSPITALIST

## 2018-03-13 PROCEDURE — 700102 HCHG RX REV CODE 250 W/ 637 OVERRIDE(OP)

## 2018-03-13 PROCEDURE — 99232 SBSQ HOSP IP/OBS MODERATE 35: CPT | Performed by: INTERNAL MEDICINE

## 2018-03-13 PROCEDURE — 160036 HCHG PACU - EA ADDL 30 MINS PHASE I: Performed by: SURGERY

## 2018-03-13 PROCEDURE — 93005 ELECTROCARDIOGRAM TRACING: CPT | Performed by: SURGERY

## 2018-03-13 PROCEDURE — 500002 HCHG ADHESIVE, DERMABOND: Performed by: SURGERY

## 2018-03-13 PROCEDURE — 700101 HCHG RX REV CODE 250: Performed by: HOSPITALIST

## 2018-03-13 PROCEDURE — 83735 ASSAY OF MAGNESIUM: CPT

## 2018-03-13 PROCEDURE — 160039 HCHG SURGERY MINUTES - EA ADDL 1 MIN LEVEL 3: Performed by: SURGERY

## 2018-03-13 PROCEDURE — 501838 HCHG SUTURE GENERAL: Performed by: SURGERY

## 2018-03-13 PROCEDURE — 700102 HCHG RX REV CODE 250 W/ 637 OVERRIDE(OP): Performed by: INTERNAL MEDICINE

## 2018-03-13 PROCEDURE — 501583 HCHG TROCAR, THRD CAN&SEAL 5X100: Performed by: SURGERY

## 2018-03-13 PROCEDURE — 500697 HCHG HEMOCLIP, LARGE (ORANGE): Performed by: SURGERY

## 2018-03-13 PROCEDURE — 0FT44ZZ RESECTION OF GALLBLADDER, PERCUTANEOUS ENDOSCOPIC APPROACH: ICD-10-PCS | Performed by: SURGERY

## 2018-03-13 PROCEDURE — 36415 COLL VENOUS BLD VENIPUNCTURE: CPT

## 2018-03-13 PROCEDURE — 85025 COMPLETE CBC W/AUTO DIFF WBC: CPT

## 2018-03-13 PROCEDURE — 700101 HCHG RX REV CODE 250

## 2018-03-13 PROCEDURE — 700105 HCHG RX REV CODE 258: Performed by: HOSPITALIST

## 2018-03-13 PROCEDURE — A9270 NON-COVERED ITEM OR SERVICE: HCPCS | Performed by: HOSPITALIST

## 2018-03-13 PROCEDURE — 501570 HCHG TROCAR, SEPARATOR: Performed by: SURGERY

## 2018-03-13 PROCEDURE — 160028 HCHG SURGERY MINUTES - 1ST 30 MINS LEVEL 3: Performed by: SURGERY

## 2018-03-13 PROCEDURE — A9270 NON-COVERED ITEM OR SERVICE: HCPCS

## 2018-03-13 PROCEDURE — 160048 HCHG OR STATISTICAL LEVEL 1-5: Performed by: SURGERY

## 2018-03-13 PROCEDURE — 160009 HCHG ANES TIME/MIN: Performed by: SURGERY

## 2018-03-13 PROCEDURE — 700102 HCHG RX REV CODE 250 W/ 637 OVERRIDE(OP): Performed by: HOSPITALIST

## 2018-03-13 PROCEDURE — 160002 HCHG RECOVERY MINUTES (STAT): Performed by: SURGERY

## 2018-03-13 PROCEDURE — 80053 COMPREHEN METABOLIC PANEL: CPT

## 2018-03-13 PROCEDURE — 83036 HEMOGLOBIN GLYCOSYLATED A1C: CPT

## 2018-03-13 PROCEDURE — 160035 HCHG PACU - 1ST 60 MINS PHASE I: Performed by: SURGERY

## 2018-03-13 RX ORDER — ONDANSETRON 4 MG/1
4 TABLET, FILM COATED ORAL EVERY 4 HOURS PRN
Qty: 20 TAB | Refills: 3 | Status: SHIPPED | OUTPATIENT
Start: 2018-03-13 | End: 2022-01-13

## 2018-03-13 RX ORDER — DOCUSATE SODIUM 100 MG/1
100 CAPSULE, LIQUID FILLED ORAL 2 TIMES DAILY
Qty: 30 CAP | Refills: 3 | Status: SHIPPED | OUTPATIENT
Start: 2018-03-13 | End: 2022-01-13

## 2018-03-13 RX ORDER — BUPIVACAINE HYDROCHLORIDE AND EPINEPHRINE 5; 5 MG/ML; UG/ML
INJECTION, SOLUTION EPIDURAL; INTRACAUDAL; PERINEURAL
Status: DISCONTINUED | OUTPATIENT
Start: 2018-03-13 | End: 2018-03-13 | Stop reason: HOSPADM

## 2018-03-13 RX ORDER — KETOROLAC TROMETHAMINE 30 MG/ML
INJECTION, SOLUTION INTRAMUSCULAR; INTRAVENOUS
Status: COMPLETED
Start: 2018-03-13 | End: 2018-03-13

## 2018-03-13 RX ORDER — OXYCODONE HCL 5 MG/5 ML
SOLUTION, ORAL ORAL
Status: DISPENSED
Start: 2018-03-13 | End: 2018-03-13

## 2018-03-13 RX ORDER — POTASSIUM CHLORIDE 20 MEQ/1
40 TABLET, EXTENDED RELEASE ORAL 2 TIMES DAILY
Status: DISCONTINUED | OUTPATIENT
Start: 2018-03-13 | End: 2018-03-14 | Stop reason: HOSPADM

## 2018-03-13 RX ORDER — HYDROCODONE BITARTRATE AND ACETAMINOPHEN 5; 325 MG/1; MG/1
1-2 TABLET ORAL EVERY 6 HOURS PRN
Qty: 40 TAB | Refills: 0 | Status: SHIPPED | OUTPATIENT
Start: 2018-03-13 | End: 2018-03-18

## 2018-03-13 RX ORDER — ONDANSETRON 2 MG/ML
INJECTION INTRAMUSCULAR; INTRAVENOUS
Status: COMPLETED
Start: 2018-03-13 | End: 2018-03-13

## 2018-03-13 RX ADMIN — CEFTRIAXONE 2 G: 2 INJECTION, POWDER, FOR SOLUTION INTRAMUSCULAR; INTRAVENOUS at 09:00

## 2018-03-13 RX ADMIN — DILTIAZEM HYDROCHLORIDE 240 MG: 120 CAPSULE, COATED, EXTENDED RELEASE ORAL at 10:41

## 2018-03-13 RX ADMIN — OXYCODONE HYDROCHLORIDE 5 MG: 5 TABLET ORAL at 15:19

## 2018-03-13 RX ADMIN — METRONIDAZOLE 500 MG: 500 INJECTION, SOLUTION INTRAVENOUS at 14:55

## 2018-03-13 RX ADMIN — CLONIDINE HYDROCHLORIDE 0.2 MG: 0.1 TABLET ORAL at 10:41

## 2018-03-13 RX ADMIN — KETOROLAC TROMETHAMINE 30 MG: 30 INJECTION, SOLUTION INTRAMUSCULAR at 09:27

## 2018-03-13 RX ADMIN — POTASSIUM CHLORIDE 40 MEQ: 2 INJECTION, SOLUTION, CONCENTRATE INTRAVENOUS at 05:07

## 2018-03-13 RX ADMIN — FENTANYL CITRATE 50 MCG: 50 INJECTION, SOLUTION INTRAMUSCULAR; INTRAVENOUS at 09:40

## 2018-03-13 RX ADMIN — ONDANSETRON HYDROCHLORIDE 4 MG: 2 INJECTION, SOLUTION INTRAMUSCULAR; INTRAVENOUS at 09:27

## 2018-03-13 RX ADMIN — OMEPRAZOLE 20 MG: 20 CAPSULE, DELAYED RELEASE ORAL at 10:41

## 2018-03-13 RX ADMIN — POTASSIUM CHLORIDE AND SODIUM CHLORIDE: 900; 150 INJECTION, SOLUTION INTRAVENOUS at 10:37

## 2018-03-13 RX ADMIN — METOPROLOL TARTRATE 100 MG: 100 TABLET, FILM COATED ORAL at 10:41

## 2018-03-13 RX ADMIN — METOPROLOL TARTRATE 100 MG: 100 TABLET, FILM COATED ORAL at 22:15

## 2018-03-13 RX ADMIN — POTASSIUM CHLORIDE 40 MEQ: 1500 TABLET, EXTENDED RELEASE ORAL at 22:14

## 2018-03-13 RX ADMIN — METRONIDAZOLE 500 MG: 500 INJECTION, SOLUTION INTRAVENOUS at 05:07

## 2018-03-13 RX ADMIN — METRONIDAZOLE 500 MG: 500 INJECTION, SOLUTION INTRAVENOUS at 22:16

## 2018-03-13 RX ADMIN — CLONIDINE HYDROCHLORIDE 0.2 MG: 0.1 TABLET ORAL at 22:15

## 2018-03-13 RX ADMIN — ATORVASTATIN CALCIUM 20 MG: 20 TABLET, FILM COATED ORAL at 22:15

## 2018-03-13 ASSESSMENT — ENCOUNTER SYMPTOMS
SORE THROAT: 0
DIZZINESS: 0
TINGLING: 0
EYE PAIN: 0
NAUSEA: 1
INSOMNIA: 0
SHORTNESS OF BREATH: 0
NECK PAIN: 0
ABDOMINAL PAIN: 1
FEVER: 1
CHILLS: 1
HEADACHES: 0
PALPITATIONS: 0
BLURRED VISION: 0
VOMITING: 0
BACK PAIN: 0
DEPRESSION: 0
COUGH: 0

## 2018-03-13 ASSESSMENT — PAIN SCALES - GENERAL
PAINLEVEL_OUTOF10: 4
PAINLEVEL_OUTOF10: 3
PAINLEVEL_OUTOF10: 4
PAINLEVEL_OUTOF10: 4
PAINLEVEL_OUTOF10: 2
PAINLEVEL_OUTOF10: 3
PAINLEVEL_OUTOF10: 3
PAINLEVEL_OUTOF10: 0
PAINLEVEL_OUTOF10: 3

## 2018-03-13 NOTE — CARE PLAN
Problem: Infection  Goal: Will remain free from infection  Outcome: PROGRESSING AS EXPECTED  Incisions assessed. No s/s infection. IV abx in use    Problem: Bowel/Gastric:  Goal: Normal bowel function is maintained or improved  Outcome: PROGRESSING AS EXPECTED  Denies nausea. Tolerating diet.     Problem: Pain Management  Goal: Pain level will decrease to patient's comfort goal  Outcome: PROGRESSING AS EXPECTED  Minimal complaints of pain this shift. PO medications available PRN

## 2018-03-13 NOTE — PROGRESS NOTES
Surgery    Cholecystectomy complete.  Regular diet today  Likely discharge home tomorrow.    James Dill MD  General&Vascular Surgery  [facility]  Cell: [phone]  Office: [phone]

## 2018-03-13 NOTE — PROGRESS NOTES
Pt arrived back from PACU. VSS.   Pt A&Ox4, sitting up in bed.  States pain is minimal and well controlled at this time. Pt has ice applied.   Lap sites x4 to abdomen with with dermabond. Well approximated.  Tolerating regular diet, denies n/v. Hypoactive bowel sounds, - flatus, LBM 3/10/18 .  Saturating >90% on 2L NC at this time. Encouraged coughing and deep breathing.   Pt ambulates SBA, steady gait. Encouraged pt to call for assistance after surgery and medications.   Updated on plan of care. Safety education provided. Bed locked in low. Call light within reach. Rounding in place.

## 2018-03-13 NOTE — PROGRESS NOTES
Gastroenterology Progress Note     Author: Ritoperioc Miller   Date & Time Created: 3/13/2018 8:30 AM    Interval History:  Joshua is a 57 year old  from [place], [place] who complains of acute onset of epigastric pain radiating into the back, nausea, and shaking chills on 3/10/2018 at 2:30 am.  Despite the pain, he worked an 8 hour shift.  He had experienced 2 prior bouts of similar pain, once a month ago and another time 2 months ago, but unlike those episodes, this pain didn't resolve.  Therefore, he went to the ER.  Imaging with US/CT revealed thickened GB wall, sludge in gallbladder, and dilated CBD to 11.5 mm.    3/13/2018: Yesterday ERCP by Dr. Luis revealed CBD dilatation with stones which was successfully treated with sphincterotomy and stone extraction.  He was pain free and tolerating diet last night.  Today he is in the OR undergoing cholecystectomy.          Chief Complaint:  Epigastric pain and jaundice    Review of Systems:  ROS    Physical Exam: Not examined, not in room.  Physical Exam  Labs:        Invalid input(s): [id number]      Recent Labs      03/11/18 1157 03/12/18 0130 03/13/18 0229   SODIUM  134*  135  137   POTASSIUM  3.1*  3.1*  3.0*   CHLORIDE  93*  95*  98   CO2  32  32  32   BUN  12  10  10   CREATININE  1.04  0.88  0.74   MAGNESIUM   --   1.8  1.8   PHOSPHORUS   --   2.9   --    CALCIUM  9.6  9.0  9.3     Recent Labs      03/11/18 1157 03/12/18 0130 03/13/18 0229   ALTSGPT  235*  161*  124*   ASTSGOT  166*  94*  65*   ALKPHOSPHAT  327*  270*  259*   TBILIRUBIN  11.0*  9.0*  4.3*   LIPASE  25   --    --    GLUCOSE  111*  137*  176*     Recent Labs      03/11/18 1157 03/12/18 0130 03/13/18 0229   RBC  4.76  4.34*  4.28*   HEMOGLOBIN  15.3  13.9*  13.7*   HEMATOCRIT  44.8  41.1*  40.2*   PLATELETCT  95*  90*  86*   PROTHROMBTM  13.9   --    --    APTT  26.4   --    --    INR  1.10   --    --      Recent Labs      03/11/18   1157  03/12/18   0130   18   0229   WBC  9.6  10.0  8.3   NEUTSPOLYS  83.20*  84.30*  86.80*   LYMPHOCYTES  8.30*  7.70*  7.00*   MONOCYTES  6.90  6.60  5.50   EOSINOPHILS  1.10  0.50  0.00   BASOPHILS  0.30  0.40  0.20   ASTSGOT  166*  94*  65*   ALTSGPT  235*  161*  124*   ALKPHOSPHAT  327*  270*  259*   TBILIRUBIN  11.0*  9.0*  4.3*     Hemodynamics:  Temp (24hrs), Av.8 °C (98.2 °F), Min:36.4 °C (97.5 °F), Max:37.3 °C (99.2 °F)  Temperature: 37.1 °C (98.8 °F)  Pulse  Av.9  Min: 58  Max: 80Heart Rate (Monitored): 77  Blood Pressure: 126/77, NIBP: 144/86     Respiratory:    Respiration: 16, Pulse Oximetry: 92 %           Fluids:    Intake/Output Summary (Last 24 hours) at 18 0830  Last data filed at 18 0400   Gross per 24 hour   Intake             3170 ml   Output             1900 ml   Net             1270 ml        GI/Nutrition:  Orders Placed This Encounter   Procedures   • DIET NPO     Standing Status:   Standing     Number of Occurrences:   8     Order Specific Question:   Restrict to:     Answer:   Sips with Medications [3]     Medical Decision Making, by Problem:  Active Hospital Problems    Diagnosis   • Hyperglycemia [R73.9]   • Postoperative pain [G89.18]   • Abdominal pain [R10.9]   • Alcohol dependence (CMS-HCC) [F10.20]   • HTN (hypertension) [I10]   • Dyslipidemia [E78.5]   • Hypokalemia [E87.6]     Impression:  1. Obstructive jaundice and cholangitis due to CBD stones successfully treated with ERCP/S and stone extraction.  2. Cholelithiasis +/- cholecystitis.  Undergoing cholecystectomy today.  3. Obstructive jaundice and elevated liver enzymes due to #1.  Improving.  4. Alcohol dependence.  5. Thrombocytopenia.  6. Hypokalemia.    Plan:  No additions at this time.  I will sign off.  Please call if needed again.    Quality-Core Measures

## 2018-03-13 NOTE — CARE PLAN
Problem: Communication  Goal: The ability to communicate needs accurately and effectively will improve  Outcome: PROGRESSING AS EXPECTED  Education provided on importance of using call light to alert staff of patient needs. Pt demonstrates understanding by using call light appropriately.    Problem: Pain Management  Goal: Pain level will decrease to patient's comfort goal  Outcome: PROGRESSING AS EXPECTED  Patient states pain has significantly improved since procedure in AM. No complaints of pain this shift so far. Will continue to monitor.

## 2018-03-13 NOTE — PROGRESS NOTES
Discharge Instructions: Laparoscopic Cholecystectomy  ==========================================    1. DIET: Upon discharge from the hospital you may resume your normal preoperative diet. Depending on how you are feeling and whether you have nausea or not, you may wish to stay with a bland diet for the first few days. However, you can advance this as quickly as you feel ready.    2. ACTIVITIES: After discharge from the hospital, you may resume routine activities. However, there should be no heavy lifting (greater than 15 pounds) and no strenuous activities until after your follow-up visit. Otherwise, routine activities of daily living are acceptable.    3. DRIVING: You may drive whenever you are off pain medications and are able to perform the activities needed to drive, i.e. turning, bending, twisting, etc.    4. BATHING: OK to shower starting one day after surgery.  The incisions are covered with skin glue which is waterproof.  It will start to peel off in 5-7 days which is normal.  Avoid submerging the incisions in water (tub, bath, pool) for at least a week.     5. BOWEL FUNCTION: A few patients, after this operation, will develop either frequent or loose stools after meals. This usually corrects itself after a few days to a few weeks. If this occurs, do not worry; it is not unusual and will resolve. Much more common than loose stools, is constipation. The combination of pain medication and decreased activity level can cause constipation in otherwise normal patients. If you feel this is occurring, take a laxative (Milk of Magnesia, Ex-Lax, Senokot, etc.) until the problem has resolved.    6. PAIN MEDICATION: You will be given a prescription for pain medication at discharge. Please take these as directed. It is important to remember not to take medications on an empty stomach as this may cause nausea.  You can transition from the prescription-strength pain medication to over-the-counter medications as your pain  improves, such as tylenol, ibuprofen, or Aleve.    7.CALL IF YOU HAVE: (1) Fevers to more than 101.5 F, (2) Unusual chest or leg pain, (3) Drainage or fluid from incision that may be foul smelling, increased tenderness or soreness at the wound or the wound edges are no longer together, redness or swelling at the incision site. Please do not hesitate to call with any other questions.     8. APPOINTMENT: Contact our office at [phone] to schedule a follow-up appointment about 2 weeks following your procedure.    If you have any additional questions, please do not hesitate to call the office and speak to either myself or the physician on call.    Office address:  [address] [place] [address]  [phone]    James Dill M.D.  [facility]    Work Excuse after Surgery  ____________________________    3/13/2018      To Whom it May Concern:     Joshua Mckeon underwent surgery at [facility] on 3/13/2018.    He is cleared to return to work 1 week after his operation.    He is restricted to light activity for 3 weeks from the date of surgery.  During that time, he cannot lift over 15lbs, and must avoid strenuous activity, running, repetitive bending or twisting, or similar activities.  After this initial recovery period, he can return to full range of activities including heavy lifting.    If he can not return to work on light-duty restrictions, then I will need to keep him off of work for the entire 3 weeks to avoid tearing his incisions.    If there are any questions or concerns, please contact my office at [phone].    Thank you.      James Dill MD  General and Vascular Surgery  [facility]  [phone]

## 2018-03-13 NOTE — PROGRESS NOTES
[facility] Hospitalist Progress Note    Date of Service: 3/13/2018    Chief Complaint  57 y.o. male admitted 3/11/2018 with abdominal pain. He has been found to have choledocholithiasis s/p ercp and stone extraction.     Interval Problem Update  Pt seen and examined, had ERCP yesterday and cholecystectomy today, tolerated procedure well  Surgery following appreciate rec.     Consultants/Specialty  GI  Surgery     Disposition  Home when medically cleared.         Review of Systems   Constitutional: Positive for chills, fever and malaise/fatigue.   HENT: Negative for sore throat.    Eyes: Negative for blurred vision and pain.   Respiratory: Negative for cough and shortness of breath.    Cardiovascular: Negative for chest pain and palpitations.   Gastrointestinal: Positive for abdominal pain and nausea. Negative for vomiting.   Genitourinary: Negative for dysuria and urgency.   Musculoskeletal: Negative for back pain and neck pain.   Skin: Negative for itching and rash.   Neurological: Negative for dizziness, tingling and headaches.   Psychiatric/Behavioral: Negative for depression. The patient does not have insomnia.    All other systems reviewed and are negative.     Physical Exam  Laboratory/Imaging   Hemodynamics  Temp (24hrs), Av.7 °C (98 °F), Min:36.1 °C (97 °F), Max:37.3 °C (99.2 °F)   Temperature: 37.3 °C (99.2 °F)  Pulse  Av.3  Min: 58  Max: 92 Heart Rate (Monitored): 78  Blood Pressure: 137/82, NIBP: 136/73      Respiratory      Respiration: 18, Pulse Oximetry: 97 %             Fluids    Intake/Output Summary (Last 24 hours) at 18 1523  Last data filed at 18 1000   Gross per 24 hour   Intake             3240 ml   Output             1650 ml   Net             1590 ml       Nutrition  Orders Placed This Encounter   Procedures   • DIET ORDER     Standing Status:   Standing     Number of Occurrences:   1     Order Specific Question:   Diet:     Answer:   Regular [1]     Physical Exam    Constitutional: He is oriented to person, place, and time. He appears well-developed and well-nourished. No distress.   HENT:   Right Ear: External ear normal.   Left Ear: External ear normal.   Nose: Nose normal.   Eyes: Right eye exhibits no discharge. Left eye exhibits no discharge. No scleral icterus.   Neck: No JVD present. No tracheal deviation present.   Cardiovascular: Normal rate, normal heart sounds and intact distal pulses.    No murmur heard.  Cap refill 2 sec   Pulmonary/Chest: Effort normal and breath sounds normal. No respiratory distress. He has no wheezes. He has no rales.   Abdominal: Soft. Bowel sounds are normal. He exhibits no distension. There is tenderness. There is no guarding.   Musculoskeletal: He exhibits no edema or tenderness.   Neurological: He is alert and oriented to person, place, and time.   Skin: Skin is warm and dry. He is not diaphoretic. No erythema.   Normal skin color   Psychiatric: He has a normal mood and affect. His behavior is normal.   Nursing note and vitals reviewed.      Recent Labs      03/11/18   1157  03/12/18   0130  03/13/18   0229   WBC  9.6  10.0  8.3   RBC  4.76  4.34*  4.28*   HEMOGLOBIN  15.3  13.9*  13.7*   HEMATOCRIT  44.8  41.1*  40.2*   MCV  94.1  94.7  93.9   MCH  32.1  32.0  32.0   MCHC  34.2  33.8  34.1   RDW  48.7  49.1  47.5   PLATELETCT  95*  90*  86*   MPV  11.1  11.9  12.1     Recent Labs      03/12/18   0130  03/13/18   0229  03/13/18   1046   SODIUM  135  137  138   POTASSIUM  3.1*  3.0*  3.3*   CHLORIDE  95*  98  100   CO2  32  32  32   GLUCOSE  137*  176*  158*   BUN  10  10  11   CREATININE  0.88  0.74  0.67   CALCIUM  9.0  9.3  8.6     Recent Labs      03/11/18   1157   APTT  26.4   INR  1.10                  Assessment/Plan     * Choledocholithiasis   Assessment & Plan    S/p ERCP on 3/12/18  Had cholecystectomy today  Surgery following appreciate rec.         Hyperglycemia   Assessment & Plan    Reactive?  Check a1c and trend.          Hypokalemia   Assessment & Plan    Trend and replace. Check mag.         Dyslipidemia   Assessment & Plan    No meds for now given LFT issues.         HTN (hypertension)   Assessment & Plan    Continue meds and trend bp        Alcohol dependence (CMS-HCC)   Assessment & Plan    No withdrawal noted.   Trend exam  ciwa prn            Quality-Core Measures

## 2018-03-13 NOTE — PROGRESS NOTES
Assessment completed.  AA&Ox4. VSS. SpO2 >90% on 1L via NC. Denies SOB.  Denies any pain.  Tolerating diet. Denies N/V. NPO at midnight.  + void. LBM 3/10/18.  Pt is upself.  POC discussed. All questions answered.  Call light within reach. Pt calls appropriately.

## 2018-03-13 NOTE — OP REPORT
General Surgery Operative Report      Date of Service: 3/13/2018    Patient Name: Joshua Mckeon    Patient MRN:  [id number]    --------------------------------------------------------------------------------    Preoperative Diagnosis:  1) Choledocholithiasis    Postoperative Diagnosis:  Same      Procedure Performed:  1) Laparoscopic cholecystectomy      --------------------------------------------------------------------------------    Surgeon:  James Dill MD    Assistant:  John WAGNER    Anesthesia:  GETA, and local anesthetic    IVF:   Per anesthesia report    UOP:   No mayorga    Est. Blood Loss: minimal     Drains:  none     Specimens:  gallbladder for permanent pathology    Findings:  Mild inflammation of gallbladder    Complications: none     Disposition:  PACU in stable condition    --------------------------------------------------------------------------------    Indications:      Joshua Mckeon is a 57 y.o. male with abdominal pain who was found to have choledocholithiasis and underwent ERCP with stone extraction.  Cholecystectomy was offered to decrease the risk of recurrent episodes.  An extensive PARQ conference was held with the patient in regard to laparoscopic cholecystectomy.  The patient was made aware of the alternatives, including operative and non-operative management. The risks of bleeding, infection, damage to surrounding structures, need for reoperation, bile duct injury, stroke, MI, and death were discussed with the patient. The patient was given a chance to ask questions, and all questions were answered to their satisfaction. The patient demonstrated adequate understanding, seemed pleased with the plan, and wished to proceed.     Procedure in detail:  The patient was brought to the operating suite, placed supine on the operating table and general anesthesia was administered.  The patient's abdomen was prepped and draped in the sterile fashion using ChloraPrep.  A surgical time-out was  called to identify the correct patient, procedure and equipment and everyone was in an agreement.    Procedure began with infiltration of local anesthetic at the umbilicus and then a small incision was made.  The subcutaneous tissues were divided until  the fascia was encountered and then this was grasped with a Kocher clamp and a Veress needle was inserted.  Aspiration and saline drop test was normal and then the abdomen was insufflated to a pressure of 15.  The Veress needle was removed and a 5-mm trocar was inserted at the umbilicus.  The patient was positioned in a reverse Trendelenburg right side up position and the 5 mm camera was inserted to inspect the abdomen.  There was no evidence of trauma from insertion of the Veress needle or trocar. There was no evidence of a diffuse inflammatory process.  There were no masses and no abnormalities identified.  The gallbladder appeared mildly inflamed.    An 11mm trocar was inserted in the midline superior epigastric region, and two 5mm ports were inserted in the right upper quadrant, all under direct visualization.    The cystic  triangle was then carefully dissected using electrocautery to score the peritoneal veil and then the cystic duct was circumferentially dissected.  At this point, a clip applier was used to place 3 clips on the proximal cystic duct and one on the distal and then the duct was transected.  Further dissection was carried out until the cystic artery was identified.  It was dissected circumferentially, clipped twice proximally, once distally, and then transected.  Further dissection was carried out and no additional critical structures were identified and therefore the gallbladder was removed from the undersurface of the liver using electrocautery.  There was spillage of bile during this part of the procedure.    The gallbladder was placed in an endocatch bag and brought out through the epigastric port site.  The epigastric port site fascia was  closed with an 0-vicryl suture.  All of the other ports were removed under direct visualization and no bleeding was seen.  Skin at all port sites was closed with subcuticular absorbable suture and then the incisions were covered with skin glue.     The patient tolerated the procedure well.  All counts were correct.  He was extubated and sent to recovery in stable condition.    James Dill MD  [facility]  [phone]  Cell: [phone]

## 2018-03-14 VITALS
RESPIRATION RATE: 16 BRPM | HEART RATE: 64 BPM | HEIGHT: 71 IN | OXYGEN SATURATION: 95 % | WEIGHT: 218.48 LBS | SYSTOLIC BLOOD PRESSURE: 144 MMHG | BODY MASS INDEX: 30.59 KG/M2 | TEMPERATURE: 98.2 F | DIASTOLIC BLOOD PRESSURE: 80 MMHG

## 2018-03-14 LAB
ALBUMIN SERPL BCP-MCNC: 2.9 G/DL (ref 3.2–4.9)
ALBUMIN/GLOB SERPL: 0.9 G/DL
ALP SERPL-CCNC: 189 U/L (ref 30–99)
ALT SERPL-CCNC: 91 U/L (ref 2–50)
ANION GAP SERPL CALC-SCNC: 8 MMOL/L (ref 0–11.9)
ANION GAP SERPL CALC-SCNC: 8 MMOL/L (ref 0–11.9)
AST SERPL-CCNC: 52 U/L (ref 12–45)
BILIRUB SERPL-MCNC: 2.3 MG/DL (ref 0.1–1.5)
BUN SERPL-MCNC: 10 MG/DL (ref 8–22)
BUN SERPL-MCNC: 10 MG/DL (ref 8–22)
CALCIUM SERPL-MCNC: 8.6 MG/DL (ref 8.5–10.5)
CALCIUM SERPL-MCNC: 8.6 MG/DL (ref 8.5–10.5)
CHLORIDE SERPL-SCNC: 101 MMOL/L (ref 96–112)
CHLORIDE SERPL-SCNC: 101 MMOL/L (ref 96–112)
CO2 SERPL-SCNC: 30 MMOL/L (ref 20–33)
CO2 SERPL-SCNC: 30 MMOL/L (ref 20–33)
CREAT SERPL-MCNC: 0.74 MG/DL (ref 0.5–1.4)
CREAT SERPL-MCNC: 0.74 MG/DL (ref 0.5–1.4)
EKG IMPRESSION: NORMAL
ERYTHROCYTE [DISTWIDTH] IN BLOOD BY AUTOMATED COUNT: 50.6 FL (ref 35.9–50)
GLOBULIN SER CALC-MCNC: 3.1 G/DL (ref 1.9–3.5)
GLUCOSE SERPL-MCNC: 99 MG/DL (ref 65–99)
GLUCOSE SERPL-MCNC: 99 MG/DL (ref 65–99)
HCT VFR BLD AUTO: 36.6 % (ref 42–52)
HGB BLD-MCNC: 12.6 G/DL (ref 14–18)
MCH RBC QN AUTO: 32.7 PG (ref 27–33)
MCHC RBC AUTO-ENTMCNC: 34.4 G/DL (ref 33.7–35.3)
MCV RBC AUTO: 95.1 FL (ref 81.4–97.8)
PLATELET # BLD AUTO: 124 K/UL (ref 164–446)
PMV BLD AUTO: 11.8 FL (ref 9–12.9)
POTASSIUM SERPL-SCNC: 3.5 MMOL/L (ref 3.6–5.5)
POTASSIUM SERPL-SCNC: 3.5 MMOL/L (ref 3.6–5.5)
PROT SERPL-MCNC: 6 G/DL (ref 6–8.2)
RBC # BLD AUTO: 3.85 M/UL (ref 4.7–6.1)
SODIUM SERPL-SCNC: 139 MMOL/L (ref 135–145)
SODIUM SERPL-SCNC: 139 MMOL/L (ref 135–145)
WBC # BLD AUTO: 9.6 K/UL (ref 4.8–10.8)

## 2018-03-14 PROCEDURE — 80053 COMPREHEN METABOLIC PANEL: CPT

## 2018-03-14 PROCEDURE — 700101 HCHG RX REV CODE 250: Performed by: HOSPITALIST

## 2018-03-14 PROCEDURE — 700102 HCHG RX REV CODE 250 W/ 637 OVERRIDE(OP): Performed by: INTERNAL MEDICINE

## 2018-03-14 PROCEDURE — 85027 COMPLETE CBC AUTOMATED: CPT

## 2018-03-14 PROCEDURE — 99239 HOSP IP/OBS DSCHRG MGMT >30: CPT | Performed by: INTERNAL MEDICINE

## 2018-03-14 PROCEDURE — 36415 COLL VENOUS BLD VENIPUNCTURE: CPT

## 2018-03-14 PROCEDURE — A9270 NON-COVERED ITEM OR SERVICE: HCPCS | Performed by: INTERNAL MEDICINE

## 2018-03-14 PROCEDURE — 700111 HCHG RX REV CODE 636 W/ 250 OVERRIDE (IP): Performed by: HOSPITALIST

## 2018-03-14 PROCEDURE — A9270 NON-COVERED ITEM OR SERVICE: HCPCS | Performed by: HOSPITALIST

## 2018-03-14 PROCEDURE — 700102 HCHG RX REV CODE 250 W/ 637 OVERRIDE(OP): Performed by: HOSPITALIST

## 2018-03-14 RX ORDER — METRONIDAZOLE 500 MG/1
500 TABLET ORAL EVERY 8 HOURS
Status: DISCONTINUED | OUTPATIENT
Start: 2018-03-14 | End: 2018-03-14 | Stop reason: HOSPADM

## 2018-03-14 RX ADMIN — METOPROLOL TARTRATE 100 MG: 100 TABLET, FILM COATED ORAL at 08:29

## 2018-03-14 RX ADMIN — CEFTRIAXONE 2 G: 2 INJECTION, POWDER, FOR SOLUTION INTRAMUSCULAR; INTRAVENOUS at 08:29

## 2018-03-14 RX ADMIN — METRONIDAZOLE 500 MG: 500 INJECTION, SOLUTION INTRAVENOUS at 05:22

## 2018-03-14 RX ADMIN — POTASSIUM CHLORIDE 40 MEQ: 1500 TABLET, EXTENDED RELEASE ORAL at 08:29

## 2018-03-14 RX ADMIN — CLONIDINE HYDROCHLORIDE 0.2 MG: 0.1 TABLET ORAL at 08:28

## 2018-03-14 RX ADMIN — DILTIAZEM HYDROCHLORIDE 240 MG: 120 CAPSULE, COATED, EXTENDED RELEASE ORAL at 08:28

## 2018-03-14 RX ADMIN — OMEPRAZOLE 20 MG: 20 CAPSULE, DELAYED RELEASE ORAL at 08:29

## 2018-03-14 RX ADMIN — OXYCODONE HYDROCHLORIDE 5 MG: 5 TABLET ORAL at 00:09

## 2018-03-14 ASSESSMENT — PAIN SCALES - GENERAL
PAINLEVEL_OUTOF10: ASSUMED PAIN PRESENT
PAINLEVEL_OUTOF10: 2
PAINLEVEL_OUTOF10: 4

## 2018-03-14 NOTE — CARE PLAN
Problem: Communication  Goal: The ability to communicate needs accurately and effectively will improve  Outcome: PROGRESSING AS EXPECTED  Education provided on importance of using call light to alert staff of patient needs. Pt demonstrates understanding by using call light appropriately.    Problem: Mobility  Goal: Risk for activity intolerance will decrease  Outcome: PROGRESSING AS EXPECTED  Pt ambulating hallways frequently.

## 2018-03-14 NOTE — PROGRESS NOTES
Assumed care of pt at 0700. Pt up in bed, watching TV, family at bedside, oriented x4, Pt rates pain 2 out of 10 in abdomen, medicated per MAR. Pt - N/V. + BS, + flatus, + BM. Abdominal lap stab x4 with dermabond, MACARENA, no drainage. Pt up self. Labs noted, assessment complete. Pt updated on POC. Pt educated to use call light when in need of assisstance. Bed locked and in lowest position. All needs met at this time, call light within reach, will continue to monitor.

## 2018-03-14 NOTE — PROGRESS NOTES
Pt discharged home. Left [id number] via wheelchair with CNA. Discharge instructions and prescriptions provided prior to departure.

## 2018-03-14 NOTE — DISCHARGE SUMMARY
CHIEF COMPLAINT ON ADMISSION  No chief complaint on file.      CODE STATUS  Prior    HPI & HOSPITAL COURSE  This is a 57 y.o. male  male who started to experience abdominal pain about 2 days before admission.  The pain was moderate to severe to sharp.  It was in his mid and right upper quadrant, radiating to his back.  It was associated with recurrent episodes of nausea and vomiting.  He also had some intermittent chills.  No documented fever.  He noted dark colored urine.  He presented to his local emergency room in [place] where he was noted to have an elevated bilirubin and dilated common bile duct and was transferred to our facility for higher level of care. Surgery and GI were consulted. Pt had an ERCP done by GI  on 3/12/18 with biliary sphincterotomy with common bile duct stones extraction.   He also had cholecystectomy done by surgery on 3/13/18. Pt tolerated procedures well. He has since been hemodynamically stable and able to tolerate diet. Per surgery stand pint pt can be discharged.   He will be discharged today.     The patient met 2-midnight criteria for an inpatient stay at the time of discharge.    DISCHARGE PROBLEM LIST  Principal Problem:    Choledocholithiasis POA: Unknown  Active Problems:    Abdominal pain POA: Unknown    Alcohol dependence (CMS-HCC) POA: Unknown    HTN (hypertension) POA: Unknown    Dyslipidemia POA: Unknown    Hypokalemia POA: Unknown    Hyperglycemia POA: Unknown    Postoperative pain POA: Unknown  Resolved Problems:    Obstructive jaundice POA: Unknown      FOLLOW UP  No future appointments.  James Dill M.D.  [address] [address]  [phone]    Schedule an appointment as soon as possible for a visit in 2 weeks  As needed if concerns arise.      MEDICATIONS ON DISCHARGE   Joshua Mckeon   Home Medication Instructions AMBROSE:[id number]    Printed on:03/14/18 0021   Medication Information                      aspirin (ASA) 81 MG Chew Tab chewable  tablet  Take 81 mg by mouth every day.             atorvastatin (LIPITOR) 20 MG Tab  Take 20 mg by mouth every evening.             chlorthalidone (HYGROTON) 25 MG Tab  Take 25 mg by mouth every day.             cloNIDine (CATAPRESS) 0.2 MG Tab  Take 0.2 mg by mouth 2 times a day.             DILTIAZem CD (CARDIZEM CD) 240 MG CAPSULE SR 24 HR  Take 240 mg by mouth every day.             docusate sodium (COLACE) 100 MG Cap  Take 1 Cap by mouth 2 times a day.             HYDROcodone-acetaminophen (NORCO) 5-325 MG Tab per tablet  Take 1-2 Tabs by mouth every 6 hours as needed (as needed for pain) for up to 5 days.             metoprolol (LOPRESSOR) 100 MG Tab  Take 200 mg by mouth 2 times a day.             omeprazole (PRILOSEC) 20 MG delayed-release capsule  Take 20 mg by mouth every day.             ondansetron (ZOFRAN) 4 MG Tab tablet  Take 1 Tab by mouth every four hours as needed for Nausea/Vomiting.                 DIET  No orders of the defined types were placed in this encounter.      ACTIVITY  As tolerated.        CONSULTATIONS  GI; Dr. Miller   Surgery: Dr. Dill     PROCEDURES  ERCP  Cholecystectomy     LABORATORY  Lab Results   Component Value Date/Time    SODIUM 139 03/14/2018 03:35 AM    SODIUM 139 03/14/2018 03:35 AM    POTASSIUM 3.5 (L) 03/14/2018 03:35 AM    POTASSIUM 3.5 (L) 03/14/2018 03:35 AM    CHLORIDE 101 03/14/2018 03:35 AM    CHLORIDE 101 03/14/2018 03:35 AM    CO2 30 03/14/2018 03:35 AM    CO2 30 03/14/2018 03:35 AM    GLUCOSE 99 03/14/2018 03:35 AM    GLUCOSE 99 03/14/2018 03:35 AM    BUN 10 03/14/2018 03:35 AM    BUN 10 03/14/2018 03:35 AM    CREATININE 0.74 03/14/2018 03:35 AM    CREATININE 0.74 03/14/2018 03:35 AM        Lab Results   Component Value Date/Time    WBC 9.6 03/14/2018 03:35 AM    HEMOGLOBIN 12.6 (L) 03/14/2018 03:35 AM    HEMATOCRIT 36.6 (L) 03/14/2018 03:35 AM    PLATELETCT 124 (L) 03/14/2018 03:35 AM        Total time of the discharge process exceeds 38 minutes

## 2018-03-14 NOTE — PROGRESS NOTES
Surgery    Doing well  Labs near normal    DC home from my standpoint  Instructions and scripts in chart    James Dill MD  General&Vascular Surgery  [facility]  Cell: [phone]  Office: [phone]

## 2018-03-14 NOTE — PROGRESS NOTES
Assessment completed.  AA&Ox4. VSS on RA. Denies SOB.  Reporting some tenderness to abdomen. Tolerable per pt.  Lap sites to abdomen x4 with dermabond. CDI.  Tolerating regular diet. Denies N/V.   + void. + BM.  Pt is upself with steady gait and ambulating hallways frequently with wife.   3 laps completed at beginning of shift.   All needs met at this time.  Call light within reach. Pt calls appropriately.

## 2018-04-10 ENCOUNTER — HOSPITAL ENCOUNTER (OUTPATIENT)
Dept: RADIOLOGY | Facility: MEDICAL CENTER | Age: 58
End: 2018-04-10

## 2021-09-18 ENCOUNTER — OFFICE VISIT (OUTPATIENT)
Dept: URGENT CARE | Facility: PHYSICIAN GROUP | Age: 61
End: 2021-09-18
Payer: COMMERCIAL

## 2021-09-18 VITALS
SYSTOLIC BLOOD PRESSURE: 130 MMHG | HEIGHT: 71 IN | WEIGHT: 230 LBS | HEART RATE: 68 BPM | BODY MASS INDEX: 32.2 KG/M2 | OXYGEN SATURATION: 94 % | TEMPERATURE: 98.4 F | DIASTOLIC BLOOD PRESSURE: 80 MMHG | RESPIRATION RATE: 12 BRPM

## 2021-09-18 DIAGNOSIS — L91.8 SKIN TAG: ICD-10-CM

## 2021-09-18 DIAGNOSIS — L03.115 CELLULITIS OF RIGHT LOWER EXTREMITY: ICD-10-CM

## 2021-09-18 PROCEDURE — 99203 OFFICE O/P NEW LOW 30 MIN: CPT | Performed by: FAMILY MEDICINE

## 2021-09-18 RX ORDER — SULFAMETHOXAZOLE AND TRIMETHOPRIM 800; 160 MG/1; MG/1
1 TABLET ORAL 2 TIMES DAILY
Qty: 10 TABLET | Refills: 0 | Status: SHIPPED | OUTPATIENT
Start: 2021-09-18 | End: 2021-09-23

## 2021-09-18 ASSESSMENT — ENCOUNTER SYMPTOMS
SORE THROAT: 0
COUGH: 0
FEVER: 0
VOMITING: 0

## 2021-09-18 NOTE — PROGRESS NOTES
"Subjective:     Joshua Mckeon is a 61 y.o. male who presents for Skin Lesion (inner thigh right leg - black now )    HPI  Pt presents for evaluation of an acute problem  Patient with previous skin lesion in his right inner thigh which he thought was a skin tag  Area is now black  Having some redness at the base   No bleeding or discharge     Review of Systems   Constitutional: Negative for fever.   HENT: Negative for sore throat.    Respiratory: Negative for cough.    Gastrointestinal: Negative for vomiting.   Skin: Positive for rash.     PMH:  has no past medical history on file.  MEDS:   Current Outpatient Medications:   •  ondansetron (ZOFRAN) 4 MG Tab tablet, Take 1 Tab by mouth every four hours as needed for Nausea/Vomiting., Disp: 20 Tab, Rfl: 3  •  docusate sodium (COLACE) 100 MG Cap, Take 1 Cap by mouth 2 times a day., Disp: 30 Cap, Rfl: 3  •  metoprolol (LOPRESSOR) 100 MG Tab, Take 200 mg by mouth 2 times a day., Disp: , Rfl:   •  DILTIAZem CD (CARDIZEM CD) 240 MG CAPSULE SR 24 HR, Take 240 mg by mouth every day., Disp: , Rfl:   •  omeprazole (PRILOSEC) 20 MG delayed-release capsule, Take 20 mg by mouth every day., Disp: , Rfl:   •  cloNIDine (CATAPRESS) 0.2 MG Tab, Take 0.2 mg by mouth 2 times a day., Disp: , Rfl:   •  atorvastatin (LIPITOR) 20 MG Tab, Take 20 mg by mouth every evening., Disp: , Rfl:   •  chlorthalidone (HYGROTON) 25 MG Tab, Take 25 mg by mouth every day., Disp: , Rfl:   •  aspirin (ASA) 81 MG Chew Tab chewable tablet, Take 81 mg by mouth every day., Disp: , Rfl:   ALLERGIES:   Allergies   Allergen Reactions   • Lisinopril Swelling     \"throat started to close up\"     SURGHX:   Past Surgical History:   Procedure Laterality Date   • LAKISHA BY LAPAROSCOPY  3/13/2018    Procedure: LAKISHA BY LAPAROSCOPY;  Surgeon: James Dill M.D.;  Location: SURGERY [facility];  Service: General   • ERCP IN OR  3/12/2018    Procedure: ERCP IN OR;  Surgeon: Victorino Luis M.D.;  Location: SURGERY " [facility];  Service: Gastroenterology     SOCHX:  reports that he has never smoked. His smokeless tobacco use includes chew. He reports current alcohol use. He reports that he does not use drugs.     Objective:   /80   Pulse 68   Temp 36.9 °C (98.4 °F) (Temporal)   Resp 12   Ht 1.803 m (5' 11\")   Wt 104 kg (230 lb)   SpO2 94%   BMI 32.08 kg/m²     Physical Exam  Constitutional:       General: He is not in acute distress.     Appearance: He is well-developed. He is not diaphoretic.   Pulmonary:      Effort: Pulmonary effort is normal.   Neurological:      Mental Status: He is alert.     Small skin tag in the right inner thigh with necrotic firm black piece of skin hanging off of the base.  There is mild surrounding erythema at the base of skin tag.    Assessment/Plan:   Assessment    1. Skin tag  - sulfamethoxazole-trimethoprim (BACTRIM DS) 800-160 MG tablet; Take 1 Tablet by mouth 2 times a day for 5 days.  Dispense: 10 Tablet; Refill: 0    2. Cellulitis of right lower extremity  - sulfamethoxazole-trimethoprim (BACTRIM DS) 800-160 MG tablet; Take 1 Tablet by mouth 2 times a day for 5 days.  Dispense: 10 Tablet; Refill: 0    Patient with skin tag which became strangulated.  Also developed a mild cellulitis at the base.  Strangulated piece was snipped with iris scissors and removed in office today.  Area hemostatic and there were no complications.  Because of developing cellulitis, did recommend short course of antibiotics to ensure this heals uneventfully.  Reviewed fall precautions and will follow up as needed.    "

## 2022-01-13 PROBLEM — M17.11 OSTEOARTHRITIS OF RIGHT KNEE: Status: ACTIVE | Noted: 2022-01-13

## 2024-03-30 NOTE — CARE PLAN
Problem: Infection  Goal: Will remain free from infection  Outcome: PROGRESSING AS EXPECTED  IV abx in use.     Problem: Pain Management  Goal: Pain level will decrease to patient's comfort goal  Outcome: PROGRESSING AS EXPECTED  Pain significantly decreased after ERCP today.   PRN medications available. Didn't require any medications this shift       normal...

## 2025-03-24 ENCOUNTER — HOSPITAL ENCOUNTER (OUTPATIENT)
Facility: MEDICAL CENTER | Age: 65
End: 2025-03-24
Attending: PHYSICIAN ASSISTANT
Payer: COMMERCIAL

## 2025-03-24 LAB
APPEARANCE FLD: NORMAL
BODY FLD TYPE: NORMAL
COLOR FLD: YELLOW
CRYSTALS FLD MICRO: NORMAL
CSF COMMENTS 1658: NORMAL
EOSINOPHIL NFR FLD: 1 %
GRAM STN SPEC: NORMAL
LYMPHOCYTES NFR FLD: 1 %
MONOS+MACROS NFR FLD MANUAL: 10 %
NEUTROPHILS NFR FLD: 88 %
NUC CELL # FLD: NORMAL CELLS/UL
RBC # FLD: NORMAL CELLS/UL
SIGNIFICANT IND 70042: NORMAL
SITE SITE: NORMAL
SOURCE SOURCE: NORMAL

## 2025-03-24 PROCEDURE — 89051 BODY FLUID CELL COUNT: CPT

## 2025-03-24 PROCEDURE — 87070 CULTURE OTHR SPECIMN AEROBIC: CPT

## 2025-03-24 PROCEDURE — 87186 SC STD MICRODIL/AGAR DIL: CPT

## 2025-03-24 PROCEDURE — 87075 CULTR BACTERIA EXCEPT BLOOD: CPT

## 2025-03-24 PROCEDURE — 87205 SMEAR GRAM STAIN: CPT

## 2025-03-24 PROCEDURE — 87077 CULTURE AEROBIC IDENTIFY: CPT

## 2025-03-24 PROCEDURE — 89060 EXAM SYNOVIAL FLUID CRYSTALS: CPT

## 2025-03-25 PROBLEM — T84.53XA INFECTION OF TOTAL RIGHT KNEE REPLACEMENT (HCC): Status: ACTIVE | Noted: 2025-03-25

## 2025-03-25 NOTE — H&P (VIEW-ONLY)
"Patient ID:  Joshua Mckeon is a 64 y.o. male.    Chief Complaint: Right knee pain and swelling with history of right TKA    This was a telephone visit given the patient lives a 4 hour drive from [place].     Subjective     Joshua Mckeon is a 64 y.o. male who has a history of a right total knee arthroplasty done by my partner Dr. Garcia in 2022.  He was doing well from this until 3 to 4 weeks ago when he began to have increasing pain and swelling.  He associates this with an injury when trying to kick an outrigger.  Since that time he has had ongoing and worsening pain with significant swelling.  This was severe enough that he decided to drive to our urgent care yesterday where an aspiration was performed showing 96k cells, 88% PMNs, consistent with prosthetic joint infection.  His primary surgeon is out of town and I have been asked to assist in the patient's care.  On further questioning of the patient and his wife, they report that his pain started nearly 4 weeks ago now, rather than the 2-1/2 weeks he had reported/remembered when he was in clinic yesterday.  He denies fevers, chills, or malaise. He denies drainage from the incision.     Past Medical History  Past Medical History:   Diagnosis Date    Hypertension        Past Surgical History  Past Surgical History:   Procedure Laterality Date    PB TOTAL KNEE ARTHROPLASTY  1/31/2022    Procedure: RIGHT TOTAL KNEE ARTHROPLASTY;  Surgeon: Lloyd Garcia M.D.;  Location: [facility];  Service: Orthopedics    LAKISHA BY LAPAROSCOPY  3/13/2018    Procedure: LAKISHA BY LAPAROSCOPY;  Surgeon: James Dill M.D.;  Location: SURGERY [facility];  Service: General    ERCP IN OR  3/12/2018    Procedure: ERCP IN OR;  Surgeon: Victorino Luis M.D.;  Location: [facility];  Service: Gastroenterology       Allergies  Allergies   Allergen Reactions    Lisinopril Swelling     \"throat started to close up\"        Home Medications  Metoprolol, " atorvastatin, celebrex      Social History  Social History     Tobacco Use    Smoking status: Never    Smokeless tobacco: Current     Types: Chew   Vaping Use    Vaping status: Never Used   Substance Use Topics    Alcohol use: Not Currently     Comment: quit    Drug use: Never       Objective     Exam  This was a telephone visit, so there was no examination performed.    Data:     Imaging:  I reviewed the x-rays obtained on 3/25/2025, which show a cemented right total knee arthroplasty without evidence of complication and presence of a joint effusion.    DX-KNEE COMPLETE 4+ RIGHT  4 view x-rays of the right knee including AP, PA, sunrise and lateral   interpreted by me today show no obvious signs of fracture or dislocation.    There is a cemented TKA in place.  Implants appear to be stable with   satisfactory alignment.  Chronic proximal fibula fracture visualized.    Mild to moderate effusion visualized on the sunrise view.    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     PLAN    I discussed the above diagnosis with the patient and we reviewed both nonoperative and operative treatment options.  In regard to nonoperative treatment options, this would involve antibiotic therapy.  I did not recommend this treatment option given the low likelihood of infection eradication.  In terms of surgical treatment options, we discussed debridement with antibiotics and implant retention (DAIR) and two-stage exchange.  I discussed that DAIR would involve only 1 surgery with faster recovery, but lower likelihood of eradicating the infection. Because he has had symptoms for over 3 weeks and given his preference for the treatment with the highest chance of  infection eradication, I did not recommend this operative treatment method.  Instead, I have recommended a two-stage exchange.  In the first operation we will remove his current implants, debride and irrigate the joint, and place an articulating antibiotic spacer.  He will be on IV antibiotics specific to his organism for at least 6 weeks.  After a 2-week drug holiday, we will repeat inflammatory marker analysis and likely repeat aspiration to ensure infection eradication.  Once eradication is confirmed, we would proceed with removal of the spacer and reimplantation of a permanent prosthesis.  Despite the multiple procedures required and prolonged recovery, this has the best chance at long-term success, which is the patient's preference and he understands the longer recover involved.  The patient was in agreement and wishes to proceed with two-stage exchange arthroplasty.     The patient understood the surgical risks including but not limited to pain, scarring, bleeding, skin numbness, muscle weakness, limb length discrepancy, fracture, dislocation, repeat or recurrent infection, osteolysis, metal hypersensitivity, loosening, stiffness (meaning loss of flexion and/or loss of extension) requiring closed and/or open manipulation and/or revision, implant wear and implant failure, failure of the operation to provide symptomatic relief, nerve/vessel/ muscle/tendon/bone damage that may be permanent, seroma, hematoma, wound problems, need for allogenic blood transfusion, deep vein thrombosis with pulmonary embolism, need for reoperation/multiple operations or procedures if complications occur, and the risks of regional and/or general anesthesia, including heart attack, stroke and even death, among others. The patient also understood that a good result is not guaranteed and that poor outcomes can at times be unavoidable. We also discussed the hospital stay, postoperative rehab and follow up required. The patient was in a  position to make informed decisions and their care, confirmed their understanding of the risks as well as the benefits of surgery and wished to proceed with surgery.     Planned operation: Revision right total knee arthroplasty, both components, placement of articulating antibiotic spacer    Will have him scheduled for later this week.  I discussed that if he notes increasing fevers, chills, or generally worsening feeling of being unwell, he should go to the ED for more rapid treatment.  We will have him evaluated by the preanesthesia team to ensure no other optimization is necessary.  I have been unable to examine the patient in person and will do so on the day of surgery prior to surgery.    Cipriano Valdes M.D.   [facility]  - [facility]        **Please note that this dictation was created using voice recognition software. I have made every reasonable attempt to correct obvious errors, but I anticipate that there may be errors of grammar and possibly content that I did not discover before finalizing the note.

## 2025-03-26 ENCOUNTER — PRE-ADMISSION TESTING (OUTPATIENT)
Dept: ADMISSIONS | Facility: MEDICAL CENTER | Age: 65
DRG: 467 | End: 2025-03-26
Attending: STUDENT IN AN ORGANIZED HEALTH CARE EDUCATION/TRAINING PROGRAM
Payer: COMMERCIAL

## 2025-03-26 VITALS — BODY MASS INDEX: 35.15 KG/M2 | HEIGHT: 71 IN

## 2025-03-26 LAB
BACTERIA FLD AEROBE CULT: ABNORMAL
BACTERIA FLD AEROBE CULT: ABNORMAL
GRAM STN SPEC: ABNORMAL
SIGNIFICANT IND 70042: ABNORMAL
SITE SITE: ABNORMAL
SOURCE SOURCE: ABNORMAL

## 2025-03-26 RX ORDER — ATORVASTATIN CALCIUM 40 MG/1
1 TABLET, FILM COATED ORAL
COMMUNITY

## 2025-03-26 RX ORDER — METOPROLOL SUCCINATE 200 MG/1
1 TABLET, EXTENDED RELEASE ORAL 2 TIMES DAILY
COMMUNITY

## 2025-03-26 RX ORDER — DILTIAZEM HYDROCHLORIDE 300 MG/1
CAPSULE, COATED, EXTENDED RELEASE ORAL DAILY
COMMUNITY

## 2025-03-26 NOTE — PREPROCEDURE INSTRUCTIONS
Pre-admit telephone appointment completed. Reviewed the Preparing for your procedure handout with patient over the phone. Patient instructed per anesthesia/pharmacy guidelines regarding taking, holding, or contacting provider for instructions on regularly prescribed medications before surgery.

## 2025-03-26 NOTE — PREADMIT AVS NOTE
Current Medications   Medication Instructions    multivitamin Tab Stop 7 days before surgery    Cholecalciferol (VITAMIN D-3 PO) Stop 7 days before surgery    ECHINACEA PO Stop 7 days before surgery    Coenzyme Q10 (COQ10 PO) Stop 7 days before surgery    Acetaminophen (TYLENOL PO) As needed medication, may take if needed, including morning of procedure     Cyanocobalamin (VITAMIN B-12 PO) Stop 7 days before surgery    NON SPECIFIED Tart Cherry Extract, Swell no More Stop 7 days before surgery    celecoxib (CELEBREX) 200 MG Cap Follow instructions from surgeon or specialist.    atorvastatin (LIPITOR) 40 MG Tab Continue taking prior to surgery on your normal routine    dilTIAZem CD (CARDIZEM CD) 300 MG CAPSULE SR 24 HR Continue taking medication as prescribed, including morning of procedure     metoprolol (TOPROL XL) 200 MG XL tablet Continue taking medication as prescribed, including morning of procedure     allopurinol (ZYLOPRIM) 300 MG Tab Continue taking medication as prescribed, including morning of procedure     tadalafil (CIALIS) 5 MG tablet Stop 48 hours before surgery    omeprazole (PRILOSEC) 20 MG delayed-release capsule Continue taking medication as prescribed, including morning of procedure

## 2025-03-27 ENCOUNTER — PRE-ADMISSION TESTING (OUTPATIENT)
Dept: ADMISSIONS | Facility: MEDICAL CENTER | Age: 65
DRG: 467 | End: 2025-03-27
Attending: STUDENT IN AN ORGANIZED HEALTH CARE EDUCATION/TRAINING PROGRAM
Payer: COMMERCIAL

## 2025-03-27 NOTE — DISCHARGE PLANNING
DISCHARGE PLANNING NOTE - TOTAL JOINT    Procedure: REVISION RIGHT TOTAL KNEE ARTHROPLASTY, BOTH COMPONENTS, WITH PLACEMENT OF ARTICULATING ANTIBIOTIC SPACER   Procedure Date: 3/28/2025  Insurance: Payor: [employer] / Plan: [employer] / Product Type: *No Product type* /    Equipment currently available at home?  cane, crutches, shower chair, and Ice packs  Steps into the home? 0 from garage  Steps within the home? 1 flight (master bedroom is downstairs)  Toilet height? ADA  Type of shower? walk-in shower  Home Oxygen? No  Portable tank?    Oxygen Provider:  Planning same day discharge: No, plan is to be admitted for several days after surgery.    Is Outpatient Physical Therapy set up after surgery? No   Did you take the Total Joint Class and where or did you receive an Educational booklet? Yes, received NAON book.  Who will be your transportation home on day of discharge? Caitlin- spouse    Have you made arrangements to have someone stay with you at home for the first 3 days following discharge, and if so, whom? Caitlin- spouse    Have you notified your surgeon that you do not have transportation or someone to help you after discharge? N/A    Are you planning on going to a transitional care facility, for example a skilled nursing facility, post operatively for rehab, and if so, have you contacted your insurance plan to see if they cover this? No    Spoke with the pt and wife over the phone. Pt has antibacterial soap to complete a shower today and tomorrow. Reviewed with pt to use clean towel to dry off with both showers and to place clean sheets and pillow cases on the bed tonight. Also instructed pt not to shave or use lotions, powders or creams on his skin after the showers. Expected process in Recovery Room and dc criteria discussed with pt. All questions answered and verbalizes understanding of all instructions. Plan is for pt to be admitted for several days after surgery for IV  antibiotics. Wife will be available to help pt during stay and also be driving him home once discharged from the hospital.

## 2025-03-28 ENCOUNTER — ANESTHESIA EVENT (OUTPATIENT)
Dept: SURGERY | Facility: MEDICAL CENTER | Age: 65
DRG: 467 | End: 2025-03-28
Payer: COMMERCIAL

## 2025-03-28 ENCOUNTER — APPOINTMENT (OUTPATIENT)
Dept: RADIOLOGY | Facility: MEDICAL CENTER | Age: 65
DRG: 467 | End: 2025-03-28
Attending: STUDENT IN AN ORGANIZED HEALTH CARE EDUCATION/TRAINING PROGRAM
Payer: COMMERCIAL

## 2025-03-28 ENCOUNTER — ANESTHESIA (OUTPATIENT)
Dept: SURGERY | Facility: MEDICAL CENTER | Age: 65
DRG: 467 | End: 2025-03-28
Payer: COMMERCIAL

## 2025-03-28 ENCOUNTER — HOSPITAL ENCOUNTER (INPATIENT)
Facility: MEDICAL CENTER | Age: 65
End: 2025-03-28
Attending: STUDENT IN AN ORGANIZED HEALTH CARE EDUCATION/TRAINING PROGRAM | Admitting: STUDENT IN AN ORGANIZED HEALTH CARE EDUCATION/TRAINING PROGRAM
Payer: COMMERCIAL

## 2025-03-28 DIAGNOSIS — T84.53XA INFECTION OF TOTAL RIGHT KNEE REPLACEMENT, INITIAL ENCOUNTER (HCC): ICD-10-CM

## 2025-03-28 DIAGNOSIS — G89.18 POSTOPERATIVE PAIN: ICD-10-CM

## 2025-03-28 LAB
ALBUMIN SERPL BCP-MCNC: 3.3 G/DL (ref 3.2–4.9)
ALBUMIN/GLOB SERPL: 0.8 G/DL
ALP SERPL-CCNC: 111 U/L (ref 30–99)
ALT SERPL-CCNC: 20 U/L (ref 2–50)
ANION GAP SERPL CALC-SCNC: 12 MMOL/L (ref 7–16)
AST SERPL-CCNC: 26 U/L (ref 12–45)
BILIRUB SERPL-MCNC: 0.8 MG/DL (ref 0.1–1.5)
BUN SERPL-MCNC: 9 MG/DL (ref 8–22)
CALCIUM ALBUM COR SERPL-MCNC: 9.6 MG/DL (ref 8.5–10.5)
CALCIUM SERPL-MCNC: 9 MG/DL (ref 8.5–10.5)
CHLORIDE SERPL-SCNC: 98 MMOL/L (ref 96–112)
CO2 SERPL-SCNC: 28 MMOL/L (ref 20–33)
CREAT SERPL-MCNC: 0.81 MG/DL (ref 0.5–1.4)
CRP SERPL HS-MCNC: 15.9 MG/DL (ref 0–0.75)
ERYTHROCYTE [DISTWIDTH] IN BLOOD BY AUTOMATED COUNT: 47.4 FL (ref 35.9–50)
ERYTHROCYTE [SEDIMENTATION RATE] IN BLOOD BY WESTERGREN METHOD: 110 MM/HOUR (ref 0–20)
GFR SERPLBLD CREATININE-BSD FMLA CKD-EPI: 99 ML/MIN/1.73 M 2
GLOBULIN SER CALC-MCNC: 3.9 G/DL (ref 1.9–3.5)
GLUCOSE SERPL-MCNC: 112 MG/DL (ref 65–99)
GRAM STN SPEC: NORMAL
HCT VFR BLD AUTO: 35.2 % (ref 42–52)
HGB BLD-MCNC: 11.7 G/DL (ref 14–18)
MCH RBC QN AUTO: 31.1 PG (ref 27–33)
MCHC RBC AUTO-ENTMCNC: 33.2 G/DL (ref 32.3–36.5)
MCV RBC AUTO: 93.6 FL (ref 81.4–97.8)
PLATELET # BLD AUTO: 231 K/UL (ref 164–446)
PMV BLD AUTO: 10.5 FL (ref 9–12.9)
POTASSIUM SERPL-SCNC: 4.3 MMOL/L (ref 3.6–5.5)
PROT SERPL-MCNC: 7.2 G/DL (ref 6–8.2)
RBC # BLD AUTO: 3.76 M/UL (ref 4.7–6.1)
SIGNIFICANT IND 70042: NORMAL
SITE SITE: NORMAL
SODIUM SERPL-SCNC: 138 MMOL/L (ref 135–145)
SOURCE SOURCE: NORMAL
WBC # BLD AUTO: 10.4 K/UL (ref 4.8–10.8)

## 2025-03-28 PROCEDURE — 0SRC0EZ REPLACEMENT OF RIGHT KNEE JOINT WITH ARTICULATING SPACER, OPEN APPROACH: ICD-10-PCS | Performed by: STUDENT IN AN ORGANIZED HEALTH CARE EDUCATION/TRAINING PROGRAM

## 2025-03-28 PROCEDURE — 160031 HCHG SURGERY MINUTES - 1ST 30 MINS LEVEL 5: Performed by: STUDENT IN AN ORGANIZED HEALTH CARE EDUCATION/TRAINING PROGRAM

## 2025-03-28 PROCEDURE — 73560 X-RAY EXAM OF KNEE 1 OR 2: CPT | Mod: RT

## 2025-03-28 PROCEDURE — 64447 NJX AA&/STRD FEMORAL NRV IMG: CPT | Performed by: STUDENT IN AN ORGANIZED HEALTH CARE EDUCATION/TRAINING PROGRAM

## 2025-03-28 PROCEDURE — C1713 ANCHOR/SCREW BN/BN,TIS/BN: HCPCS | Performed by: STUDENT IN AN ORGANIZED HEALTH CARE EDUCATION/TRAINING PROGRAM

## 2025-03-28 PROCEDURE — 87070 CULTURE OTHR SPECIMN AEROBIC: CPT

## 2025-03-28 PROCEDURE — 160042 HCHG SURGERY MINUTES - EA ADDL 1 MIN LEVEL 5: Performed by: STUDENT IN AN ORGANIZED HEALTH CARE EDUCATION/TRAINING PROGRAM

## 2025-03-28 PROCEDURE — 160009 HCHG ANES TIME/MIN: Performed by: STUDENT IN AN ORGANIZED HEALTH CARE EDUCATION/TRAINING PROGRAM

## 2025-03-28 PROCEDURE — 86140 C-REACTIVE PROTEIN: CPT

## 2025-03-28 PROCEDURE — 700111 HCHG RX REV CODE 636 W/ 250 OVERRIDE (IP): Performed by: STUDENT IN AN ORGANIZED HEALTH CARE EDUCATION/TRAINING PROGRAM

## 2025-03-28 PROCEDURE — 700101 HCHG RX REV CODE 250: Performed by: STUDENT IN AN ORGANIZED HEALTH CARE EDUCATION/TRAINING PROGRAM

## 2025-03-28 PROCEDURE — 0SBC0ZZ EXCISION OF RIGHT KNEE JOINT, OPEN APPROACH: ICD-10-PCS | Performed by: STUDENT IN AN ORGANIZED HEALTH CARE EDUCATION/TRAINING PROGRAM

## 2025-03-28 PROCEDURE — 160015 HCHG STAT PREOP MINUTES: Performed by: STUDENT IN AN ORGANIZED HEALTH CARE EDUCATION/TRAINING PROGRAM

## 2025-03-28 PROCEDURE — 3E0T3BZ INTRODUCTION OF ANESTHETIC AGENT INTO PERIPHERAL NERVES AND PLEXI, PERCUTANEOUS APPROACH: ICD-10-PCS | Performed by: STUDENT IN AN ORGANIZED HEALTH CARE EDUCATION/TRAINING PROGRAM

## 2025-03-28 PROCEDURE — 87015 SPECIMEN INFECT AGNT CONCNTJ: CPT | Mod: 91

## 2025-03-28 PROCEDURE — 87641 MR-STAPH DNA AMP PROBE: CPT

## 2025-03-28 PROCEDURE — 502240 HCHG MISC OR SUPPLY RC 0272: Performed by: STUDENT IN AN ORGANIZED HEALTH CARE EDUCATION/TRAINING PROGRAM

## 2025-03-28 PROCEDURE — 36415 COLL VENOUS BLD VENIPUNCTURE: CPT

## 2025-03-28 PROCEDURE — 700102 HCHG RX REV CODE 250 W/ 637 OVERRIDE(OP): Performed by: STUDENT IN AN ORGANIZED HEALTH CARE EDUCATION/TRAINING PROGRAM

## 2025-03-28 PROCEDURE — 80053 COMPREHEN METABOLIC PANEL: CPT

## 2025-03-28 PROCEDURE — 85027 COMPLETE CBC AUTOMATED: CPT

## 2025-03-28 PROCEDURE — 27487 REVISE/REPLACE KNEE JOINT: CPT | Mod: 80ROC,RT | Performed by: STUDENT IN AN ORGANIZED HEALTH CARE EDUCATION/TRAINING PROGRAM

## 2025-03-28 PROCEDURE — 160002 HCHG RECOVERY MINUTES (STAT): Performed by: STUDENT IN AN ORGANIZED HEALTH CARE EDUCATION/TRAINING PROGRAM

## 2025-03-28 PROCEDURE — 700111 HCHG RX REV CODE 636 W/ 250 OVERRIDE (IP): Mod: JZ | Performed by: STUDENT IN AN ORGANIZED HEALTH CARE EDUCATION/TRAINING PROGRAM

## 2025-03-28 PROCEDURE — 87186 SC STD MICRODIL/AGAR DIL: CPT

## 2025-03-28 PROCEDURE — A9270 NON-COVERED ITEM OR SERVICE: HCPCS | Performed by: STUDENT IN AN ORGANIZED HEALTH CARE EDUCATION/TRAINING PROGRAM

## 2025-03-28 PROCEDURE — 0SPC0JZ REMOVAL OF SYNTHETIC SUBSTITUTE FROM RIGHT KNEE JOINT, OPEN APPROACH: ICD-10-PCS | Performed by: STUDENT IN AN ORGANIZED HEALTH CARE EDUCATION/TRAINING PROGRAM

## 2025-03-28 PROCEDURE — 770001 HCHG ROOM/CARE - MED/SURG/GYN PRIV*

## 2025-03-28 PROCEDURE — 87205 SMEAR GRAM STAIN: CPT | Mod: 91

## 2025-03-28 PROCEDURE — 502000 HCHG MISC OR IMPLANTS RC 0278: Performed by: STUDENT IN AN ORGANIZED HEALTH CARE EDUCATION/TRAINING PROGRAM

## 2025-03-28 PROCEDURE — 87077 CULTURE AEROBIC IDENTIFY: CPT | Mod: 91

## 2025-03-28 PROCEDURE — 160035 HCHG PACU - 1ST 60 MINS PHASE I: Performed by: STUDENT IN AN ORGANIZED HEALTH CARE EDUCATION/TRAINING PROGRAM

## 2025-03-28 PROCEDURE — C1776 JOINT DEVICE (IMPLANTABLE): HCPCS | Performed by: STUDENT IN AN ORGANIZED HEALTH CARE EDUCATION/TRAINING PROGRAM

## 2025-03-28 PROCEDURE — 94760 N-INVAS EAR/PLS OXIMETRY 1: CPT

## 2025-03-28 PROCEDURE — 87075 CULTR BACTERIA EXCEPT BLOOD: CPT | Mod: 91

## 2025-03-28 PROCEDURE — 27487 REVISE/REPLACE KNEE JOINT: CPT | Mod: RT | Performed by: STUDENT IN AN ORGANIZED HEALTH CARE EDUCATION/TRAINING PROGRAM

## 2025-03-28 PROCEDURE — 700105 HCHG RX REV CODE 258: Performed by: STUDENT IN AN ORGANIZED HEALTH CARE EDUCATION/TRAINING PROGRAM

## 2025-03-28 PROCEDURE — 85652 RBC SED RATE AUTOMATED: CPT

## 2025-03-28 PROCEDURE — 160048 HCHG OR STATISTICAL LEVEL 1-5: Performed by: STUDENT IN AN ORGANIZED HEALTH CARE EDUCATION/TRAINING PROGRAM

## 2025-03-28 PROCEDURE — 0SRC0J9 REPLACEMENT OF RIGHT KNEE JOINT WITH SYNTHETIC SUBSTITUTE, CEMENTED, OPEN APPROACH: ICD-10-PCS | Performed by: STUDENT IN AN ORGANIZED HEALTH CARE EDUCATION/TRAINING PROGRAM

## 2025-03-28 DEVICE — IMPLANTABLE DEVICE: Type: IMPLANTABLE DEVICE | Site: KNEE | Status: FUNCTIONAL

## 2025-03-28 DEVICE — PIN 3.2MM X 45MM MIS PINNED STERILE (1EA): Type: IMPLANTABLE DEVICE | Site: KNEE | Status: FUNCTIONAL

## 2025-03-28 DEVICE — IMPLANT LEGION CR NP FEM SZ 6 RT: Type: IMPLANTABLE DEVICE | Site: KNEE | Status: FUNCTIONAL

## 2025-03-28 DEVICE — CEMENT ORTHOPEDIC HV US (10/PK): Type: IMPLANTABLE DEVICE | Site: KNEE | Status: FUNCTIONAL

## 2025-03-28 RX ORDER — KETOROLAC TROMETHAMINE 30 MG/ML
INJECTION, SOLUTION INTRAMUSCULAR; INTRAVENOUS
Status: DISCONTINUED | OUTPATIENT
Start: 2025-03-28 | End: 2025-03-28 | Stop reason: HOSPADM

## 2025-03-28 RX ORDER — ONDANSETRON 2 MG/ML
4 INJECTION INTRAMUSCULAR; INTRAVENOUS
Status: DISCONTINUED | OUTPATIENT
Start: 2025-03-28 | End: 2025-03-28 | Stop reason: HOSPADM

## 2025-03-28 RX ORDER — ACETAMINOPHEN 500 MG
1000 TABLET ORAL EVERY 6 HOURS PRN
Status: DISCONTINUED | OUTPATIENT
Start: 2025-04-02 | End: 2025-04-02 | Stop reason: HOSPADM

## 2025-03-28 RX ORDER — DIPHENHYDRAMINE HYDROCHLORIDE 50 MG/ML
12.5 INJECTION, SOLUTION INTRAMUSCULAR; INTRAVENOUS
Status: DISCONTINUED | OUTPATIENT
Start: 2025-03-28 | End: 2025-03-28 | Stop reason: HOSPADM

## 2025-03-28 RX ORDER — BUPIVACAINE HYDROCHLORIDE 5 MG/ML
INJECTION, SOLUTION EPIDURAL; INTRACAUDAL; PERINEURAL
Status: COMPLETED | OUTPATIENT
Start: 2025-03-28 | End: 2025-03-28

## 2025-03-28 RX ORDER — BUPIVACAINE HYDROCHLORIDE 5 MG/ML
INJECTION, SOLUTION EPIDURAL; INTRACAUDAL; PERINEURAL
Status: COMPLETED
Start: 2025-03-28 | End: 2025-03-28

## 2025-03-28 RX ORDER — TOBRAMYCIN 1.2 G/30ML
INJECTION, POWDER, LYOPHILIZED, FOR SOLUTION INTRAVENOUS
Status: DISCONTINUED | OUTPATIENT
Start: 2025-03-28 | End: 2025-03-28 | Stop reason: HOSPADM

## 2025-03-28 RX ORDER — OXYCODONE HCL 10 MG/1
10 TABLET, FILM COATED, EXTENDED RELEASE ORAL ONCE
Status: COMPLETED | OUTPATIENT
Start: 2025-03-28 | End: 2025-03-28

## 2025-03-28 RX ORDER — ACETAMINOPHEN 500 MG
1000 TABLET ORAL EVERY 6 HOURS
Status: COMPLETED | OUTPATIENT
Start: 2025-03-28 | End: 2025-04-02

## 2025-03-28 RX ORDER — SODIUM CHLORIDE, SODIUM LACTATE, POTASSIUM CHLORIDE, CALCIUM CHLORIDE 600; 310; 30; 20 MG/100ML; MG/100ML; MG/100ML; MG/100ML
INJECTION, SOLUTION INTRAVENOUS CONTINUOUS
Status: ACTIVE | OUTPATIENT
Start: 2025-03-28 | End: 2025-03-28

## 2025-03-28 RX ORDER — DEXAMETHASONE SODIUM PHOSPHATE 4 MG/ML
INJECTION, SOLUTION INTRA-ARTICULAR; INTRALESIONAL; INTRAMUSCULAR; INTRAVENOUS; SOFT TISSUE
Status: COMPLETED | OUTPATIENT
Start: 2025-03-28 | End: 2025-03-28

## 2025-03-28 RX ORDER — KETOROLAC TROMETHAMINE 15 MG/ML
INJECTION, SOLUTION INTRAMUSCULAR; INTRAVENOUS PRN
Status: DISCONTINUED | OUTPATIENT
Start: 2025-03-28 | End: 2025-03-28 | Stop reason: SURG

## 2025-03-28 RX ORDER — DILTIAZEM HYDROCHLORIDE 300 MG/1
300 CAPSULE, COATED, EXTENDED RELEASE ORAL DAILY
Status: DISCONTINUED | OUTPATIENT
Start: 2025-03-29 | End: 2025-04-02 | Stop reason: HOSPADM

## 2025-03-28 RX ORDER — OXYCODONE HYDROCHLORIDE 5 MG/1
5 TABLET ORAL
Refills: 0 | Status: DISCONTINUED | OUTPATIENT
Start: 2025-03-28 | End: 2025-04-02 | Stop reason: HOSPADM

## 2025-03-28 RX ORDER — ROCURONIUM BROMIDE 10 MG/ML
INJECTION, SOLUTION INTRAVENOUS PRN
Status: DISCONTINUED | OUTPATIENT
Start: 2025-03-28 | End: 2025-03-28 | Stop reason: SURG

## 2025-03-28 RX ORDER — ONDANSETRON 2 MG/ML
INJECTION INTRAMUSCULAR; INTRAVENOUS PRN
Status: DISCONTINUED | OUTPATIENT
Start: 2025-03-28 | End: 2025-03-28 | Stop reason: SURG

## 2025-03-28 RX ORDER — HALOPERIDOL 5 MG/ML
1 INJECTION INTRAMUSCULAR
Status: DISCONTINUED | OUTPATIENT
Start: 2025-03-28 | End: 2025-03-28 | Stop reason: HOSPADM

## 2025-03-28 RX ORDER — OXYCODONE HCL 5 MG/5 ML
5 SOLUTION, ORAL ORAL
Status: COMPLETED | OUTPATIENT
Start: 2025-03-28 | End: 2025-03-28

## 2025-03-28 RX ORDER — DOCUSATE SODIUM 100 MG/1
100 CAPSULE, LIQUID FILLED ORAL 2 TIMES DAILY
Status: DISCONTINUED | OUTPATIENT
Start: 2025-03-28 | End: 2025-04-02 | Stop reason: HOSPADM

## 2025-03-28 RX ORDER — ALBUTEROL SULFATE 5 MG/ML
2.5 SOLUTION RESPIRATORY (INHALATION)
Status: DISCONTINUED | OUTPATIENT
Start: 2025-03-28 | End: 2025-03-28 | Stop reason: HOSPADM

## 2025-03-28 RX ORDER — CELECOXIB 200 MG/1
200 CAPSULE ORAL 2 TIMES DAILY
Status: DISCONTINUED | OUTPATIENT
Start: 2025-03-28 | End: 2025-03-29

## 2025-03-28 RX ORDER — OXYCODONE HYDROCHLORIDE 5 MG/1
2.5 TABLET ORAL
Refills: 0 | Status: DISCONTINUED | OUTPATIENT
Start: 2025-03-28 | End: 2025-04-02 | Stop reason: HOSPADM

## 2025-03-28 RX ORDER — HYDRALAZINE HYDROCHLORIDE 20 MG/ML
5 INJECTION INTRAMUSCULAR; INTRAVENOUS
Status: DISCONTINUED | OUTPATIENT
Start: 2025-03-28 | End: 2025-03-28 | Stop reason: HOSPADM

## 2025-03-28 RX ORDER — VANCOMYCIN HYDROCHLORIDE 1 G/20ML
INJECTION, POWDER, LYOPHILIZED, FOR SOLUTION INTRAVENOUS
Status: COMPLETED | OUTPATIENT
Start: 2025-03-28 | End: 2025-03-28

## 2025-03-28 RX ORDER — CEFAZOLIN SODIUM 1 G/3ML
INJECTION, POWDER, FOR SOLUTION INTRAMUSCULAR; INTRAVENOUS PRN
Status: DISCONTINUED | OUTPATIENT
Start: 2025-03-28 | End: 2025-03-28 | Stop reason: SURG

## 2025-03-28 RX ORDER — OMEPRAZOLE 20 MG/1
20 CAPSULE, DELAYED RELEASE ORAL DAILY
Status: DISCONTINUED | OUTPATIENT
Start: 2025-03-29 | End: 2025-04-02 | Stop reason: HOSPADM

## 2025-03-28 RX ORDER — HYDROMORPHONE HYDROCHLORIDE 1 MG/ML
0.1 INJECTION, SOLUTION INTRAMUSCULAR; INTRAVENOUS; SUBCUTANEOUS
Status: DISCONTINUED | OUTPATIENT
Start: 2025-03-28 | End: 2025-03-28 | Stop reason: HOSPADM

## 2025-03-28 RX ORDER — BUPIVACAINE HYDROCHLORIDE 2.5 MG/ML
INJECTION, SOLUTION EPIDURAL; INFILTRATION; INTRACAUDAL; PERINEURAL
Status: DISCONTINUED | OUTPATIENT
Start: 2025-03-28 | End: 2025-03-28 | Stop reason: HOSPADM

## 2025-03-28 RX ORDER — OXYCODONE HCL 5 MG/5 ML
10 SOLUTION, ORAL ORAL
Status: COMPLETED | OUTPATIENT
Start: 2025-03-28 | End: 2025-03-28

## 2025-03-28 RX ORDER — MEPERIDINE HYDROCHLORIDE 25 MG/ML
6.25 INJECTION INTRAMUSCULAR; INTRAVENOUS; SUBCUTANEOUS
Status: DISCONTINUED | OUTPATIENT
Start: 2025-03-28 | End: 2025-03-28 | Stop reason: HOSPADM

## 2025-03-28 RX ORDER — METOPROLOL SUCCINATE 100 MG/1
200 TABLET, EXTENDED RELEASE ORAL
Status: DISCONTINUED | OUTPATIENT
Start: 2025-03-29 | End: 2025-03-30

## 2025-03-28 RX ORDER — CELECOXIB 200 MG/1
200 CAPSULE ORAL 2 TIMES DAILY PRN
Status: DISCONTINUED | OUTPATIENT
Start: 2025-04-02 | End: 2025-03-29

## 2025-03-28 RX ORDER — HYDROMORPHONE HYDROCHLORIDE 1 MG/ML
0.2 INJECTION, SOLUTION INTRAMUSCULAR; INTRAVENOUS; SUBCUTANEOUS
Status: DISCONTINUED | OUTPATIENT
Start: 2025-03-28 | End: 2025-03-28 | Stop reason: HOSPADM

## 2025-03-28 RX ORDER — HYDROMORPHONE HYDROCHLORIDE 2 MG/ML
INJECTION, SOLUTION INTRAMUSCULAR; INTRAVENOUS; SUBCUTANEOUS PRN
Status: DISCONTINUED | OUTPATIENT
Start: 2025-03-28 | End: 2025-03-28 | Stop reason: SURG

## 2025-03-28 RX ORDER — ATORVASTATIN CALCIUM 40 MG/1
40 TABLET, FILM COATED ORAL DAILY
Status: DISCONTINUED | OUTPATIENT
Start: 2025-03-28 | End: 2025-04-02 | Stop reason: HOSPADM

## 2025-03-28 RX ORDER — LIDOCAINE HYDROCHLORIDE 20 MG/ML
INJECTION, SOLUTION EPIDURAL; INFILTRATION; INTRACAUDAL; PERINEURAL PRN
Status: DISCONTINUED | OUTPATIENT
Start: 2025-03-28 | End: 2025-03-28 | Stop reason: SURG

## 2025-03-28 RX ORDER — DEXAMETHASONE SODIUM PHOSPHATE 4 MG/ML
INJECTION, SOLUTION INTRA-ARTICULAR; INTRALESIONAL; INTRAMUSCULAR; INTRAVENOUS; SOFT TISSUE
Status: COMPLETED
Start: 2025-03-28 | End: 2025-03-28

## 2025-03-28 RX ORDER — ASPIRIN 81 MG/1
81 TABLET ORAL 2 TIMES DAILY
Status: DISCONTINUED | OUTPATIENT
Start: 2025-03-29 | End: 2025-04-02 | Stop reason: HOSPADM

## 2025-03-28 RX ORDER — HYDROMORPHONE HYDROCHLORIDE 1 MG/ML
0.5 INJECTION, SOLUTION INTRAMUSCULAR; INTRAVENOUS; SUBCUTANEOUS
Status: DISCONTINUED | OUTPATIENT
Start: 2025-03-28 | End: 2025-03-28 | Stop reason: HOSPADM

## 2025-03-28 RX ORDER — HYDROMORPHONE HYDROCHLORIDE 1 MG/ML
0.25 INJECTION, SOLUTION INTRAMUSCULAR; INTRAVENOUS; SUBCUTANEOUS
Status: DISCONTINUED | OUTPATIENT
Start: 2025-03-28 | End: 2025-04-02 | Stop reason: HOSPADM

## 2025-03-28 RX ORDER — EPHEDRINE SULFATE 50 MG/ML
5 INJECTION, SOLUTION INTRAVENOUS
Status: DISCONTINUED | OUTPATIENT
Start: 2025-03-28 | End: 2025-03-28 | Stop reason: HOSPADM

## 2025-03-28 RX ORDER — ACETAMINOPHEN 10 MG/ML
1 INJECTION, SOLUTION INTRAVENOUS ONCE
Status: COMPLETED | OUTPATIENT
Start: 2025-03-28 | End: 2025-03-28

## 2025-03-28 RX ORDER — ALLOPURINOL 300 MG/1
300 TABLET ORAL DAILY
Status: DISCONTINUED | OUTPATIENT
Start: 2025-03-29 | End: 2025-04-02 | Stop reason: HOSPADM

## 2025-03-28 RX ORDER — TAMSULOSIN HYDROCHLORIDE 0.4 MG/1
0.4 CAPSULE ORAL 2 TIMES DAILY
COMMUNITY

## 2025-03-28 RX ORDER — DEXAMETHASONE SODIUM PHOSPHATE 4 MG/ML
INJECTION, SOLUTION INTRA-ARTICULAR; INTRALESIONAL; INTRAMUSCULAR; INTRAVENOUS; SOFT TISSUE PRN
Status: DISCONTINUED | OUTPATIENT
Start: 2025-03-28 | End: 2025-03-28 | Stop reason: SURG

## 2025-03-28 RX ORDER — TRANEXAMIC ACID 100 MG/ML
INJECTION, SOLUTION INTRAVENOUS PRN
Status: DISCONTINUED | OUTPATIENT
Start: 2025-03-28 | End: 2025-03-28 | Stop reason: SURG

## 2025-03-28 RX ADMIN — OXYCODONE HYDROCHLORIDE 10 MG: 10 TABLET, FILM COATED, EXTENDED RELEASE ORAL at 12:58

## 2025-03-28 RX ADMIN — PROPOFOL 50 MG: 10 INJECTION, EMULSION INTRAVENOUS at 14:50

## 2025-03-28 RX ADMIN — SUGAMMADEX 200 MG: 100 INJECTION, SOLUTION INTRAVENOUS at 17:34

## 2025-03-28 RX ADMIN — PIPERACILLIN AND TAZOBACTAM 3.38 G: 3; .375 INJECTION, POWDER, FOR SOLUTION INTRAVENOUS at 22:47

## 2025-03-28 RX ADMIN — BUPIVACAINE HYDROCHLORIDE 30 ML: 5 INJECTION, SOLUTION EPIDURAL; INTRACAUDAL at 14:13

## 2025-03-28 RX ADMIN — CEFAZOLIN 2 G: 1 INJECTION, POWDER, FOR SOLUTION INTRAMUSCULAR; INTRAVENOUS at 15:18

## 2025-03-28 RX ADMIN — DEXAMETHASONE SODIUM PHOSPHATE 10 MG: 4 INJECTION INTRA-ARTICULAR; INTRALESIONAL; INTRAMUSCULAR; INTRAVENOUS; SOFT TISSUE at 14:39

## 2025-03-28 RX ADMIN — HYDROMORPHONE HYDROCHLORIDE 0.5 MG: 1 INJECTION, SOLUTION INTRAMUSCULAR; INTRAVENOUS; SUBCUTANEOUS at 18:09

## 2025-03-28 RX ADMIN — VANCOMYCIN HYDROCHLORIDE 2750 MG: 5 INJECTION, POWDER, LYOPHILIZED, FOR SOLUTION INTRAVENOUS at 20:11

## 2025-03-28 RX ADMIN — SODIUM CHLORIDE, POTASSIUM CHLORIDE, SODIUM LACTATE AND CALCIUM CHLORIDE: 600; 310; 30; 20 INJECTION, SOLUTION INTRAVENOUS at 12:53

## 2025-03-28 RX ADMIN — ATORVASTATIN CALCIUM 40 MG: 40 TABLET, FILM COATED ORAL at 19:33

## 2025-03-28 RX ADMIN — DIPHENHYDRAMINE HYDROCHLORIDE 12.5 MG: 50 INJECTION, SOLUTION INTRAMUSCULAR; INTRAVENOUS at 18:09

## 2025-03-28 RX ADMIN — ACETAMINOPHEN 1000 MG: 500 TABLET ORAL at 19:34

## 2025-03-28 RX ADMIN — ACETAMINOPHEN 1 G: 10 INJECTION INTRAVENOUS at 12:54

## 2025-03-28 RX ADMIN — ONDANSETRON 4 MG: 2 INJECTION INTRAMUSCULAR; INTRAVENOUS at 17:33

## 2025-03-28 RX ADMIN — TRANEXAMIC ACID 1000 MG: 100 INJECTION, SOLUTION INTRAVENOUS at 14:39

## 2025-03-28 RX ADMIN — KETOROLAC TROMETHAMINE 15 MG: 15 INJECTION, SOLUTION INTRAMUSCULAR; INTRAVENOUS at 17:33

## 2025-03-28 RX ADMIN — FENTANYL CITRATE 50 MCG: 50 INJECTION, SOLUTION INTRAMUSCULAR; INTRAVENOUS at 14:11

## 2025-03-28 RX ADMIN — HYDROMORPHONE HYDROCHLORIDE 1 MG: 2 INJECTION INTRAMUSCULAR; INTRAVENOUS; SUBCUTANEOUS at 14:50

## 2025-03-28 RX ADMIN — PIPERACILLIN AND TAZOBACTAM 3.38 G: 3; .375 INJECTION, POWDER, FOR SOLUTION INTRAVENOUS at 19:35

## 2025-03-28 RX ADMIN — OXYCODONE HYDROCHLORIDE 10 MG: 5 SOLUTION ORAL at 18:09

## 2025-03-28 RX ADMIN — ROCURONIUM BROMIDE 50 MG: 10 INJECTION INTRAVENOUS at 14:34

## 2025-03-28 RX ADMIN — DOCUSATE SODIUM 100 MG: 100 CAPSULE, LIQUID FILLED ORAL at 19:34

## 2025-03-28 RX ADMIN — ACETAMINOPHEN 1000 MG: 500 TABLET ORAL at 23:01

## 2025-03-28 RX ADMIN — TRANEXAMIC ACID 1000 MG: 100 INJECTION, SOLUTION INTRAVENOUS at 16:59

## 2025-03-28 RX ADMIN — LIDOCAINE HYDROCHLORIDE 100 MG: 20 INJECTION, SOLUTION EPIDURAL; INFILTRATION; INTRACAUDAL; PERINEURAL at 14:34

## 2025-03-28 RX ADMIN — PROPOFOL 150 MG: 10 INJECTION, EMULSION INTRAVENOUS at 14:34

## 2025-03-28 RX ADMIN — CELECOXIB 200 MG: 200 CAPSULE ORAL at 19:33

## 2025-03-28 RX ADMIN — HYDROMORPHONE HYDROCHLORIDE 0.5 MG: 2 INJECTION INTRAMUSCULAR; INTRAVENOUS; SUBCUTANEOUS at 14:29

## 2025-03-28 RX ADMIN — DEXAMETHASONE SODIUM PHOSPHATE 4 MG: 4 INJECTION, SOLUTION INTRA-ARTICULAR; INTRALESIONAL; INTRAMUSCULAR; INTRAVENOUS; SOFT TISSUE at 14:13

## 2025-03-28 SDOH — ECONOMIC STABILITY: TRANSPORTATION INSECURITY
IN THE PAST 12 MONTHS, HAS THE LACK OF TRANSPORTATION KEPT YOU FROM MEDICAL APPOINTMENTS OR FROM GETTING MEDICATIONS?: NO

## 2025-03-28 SDOH — ECONOMIC STABILITY: TRANSPORTATION INSECURITY
IN THE PAST 12 MONTHS, HAS LACK OF RELIABLE TRANSPORTATION KEPT YOU FROM MEDICAL APPOINTMENTS, MEETINGS, WORK OR FROM GETTING THINGS NEEDED FOR DAILY LIVING?: NO

## 2025-03-28 ASSESSMENT — COGNITIVE AND FUNCTIONAL STATUS - GENERAL
SUGGESTED CMS G CODE MODIFIER DAILY ACTIVITY: CJ
DAILY ACTIVITIY SCORE: 22
HELP NEEDED FOR BATHING: A LITTLE
MOVING TO AND FROM BED TO CHAIR: A LITTLE
MOBILITY SCORE: 16
SUGGESTED CMS G CODE MODIFIER MOBILITY: CK
STANDING UP FROM CHAIR USING ARMS: A LITTLE
CLIMB 3 TO 5 STEPS WITH RAILING: A LOT
WALKING IN HOSPITAL ROOM: A LOT
TURNING FROM BACK TO SIDE WHILE IN FLAT BAD: A LITTLE
MOVING FROM LYING ON BACK TO SITTING ON SIDE OF FLAT BED: A LITTLE
DRESSING REGULAR LOWER BODY CLOTHING: A LITTLE

## 2025-03-28 ASSESSMENT — SOCIAL DETERMINANTS OF HEALTH (SDOH)
WITHIN THE LAST YEAR, HAVE YOU BEEN HUMILIATED OR EMOTIONALLY ABUSED IN OTHER WAYS BY YOUR PARTNER OR EX-PARTNER?: NO
IN THE PAST 12 MONTHS, HAS THE ELECTRIC, GAS, OIL, OR WATER COMPANY THREATENED TO SHUT OFF SERVICE IN YOUR HOME?: NO
WITHIN THE LAST YEAR, HAVE TO BEEN RAPED OR FORCED TO HAVE ANY KIND OF SEXUAL ACTIVITY BY YOUR PARTNER OR EX-PARTNER?: NO
WITHIN THE PAST 12 MONTHS, YOU WORRIED THAT YOUR FOOD WOULD RUN OUT BEFORE YOU GOT THE MONEY TO BUY MORE: NEVER TRUE
WITHIN THE LAST YEAR, HAVE YOU BEEN AFRAID OF YOUR PARTNER OR EX-PARTNER?: NO
WITHIN THE LAST YEAR, HAVE YOU BEEN KICKED, HIT, SLAPPED, OR OTHERWISE PHYSICALLY HURT BY YOUR PARTNER OR EX-PARTNER?: NO
WITHIN THE PAST 12 MONTHS, THE FOOD YOU BOUGHT JUST DIDN'T LAST AND YOU DIDN'T HAVE MONEY TO GET MORE: NEVER TRUE

## 2025-03-28 ASSESSMENT — LIFESTYLE VARIABLES
HAVE PEOPLE ANNOYED YOU BY CRITICIZING YOUR DRINKING: NO
EVER FELT BAD OR GUILTY ABOUT YOUR DRINKING: NO
TOTAL SCORE: 0
ALCOHOL_USE: NO
TOTAL SCORE: 0
ON A TYPICAL DAY WHEN YOU DRINK ALCOHOL HOW MANY DRINKS DO YOU HAVE: 0
HOW MANY TIMES IN THE PAST YEAR HAVE YOU HAD 5 OR MORE DRINKS IN A DAY: 0
HAVE YOU EVER FELT YOU SHOULD CUT DOWN ON YOUR DRINKING: NO
EVER HAD A DRINK FIRST THING IN THE MORNING TO STEADY YOUR NERVES TO GET RID OF A HANGOVER: NO
CONSUMPTION TOTAL: NEGATIVE
TOTAL SCORE: 0
AVERAGE NUMBER OF DAYS PER WEEK YOU HAVE A DRINK CONTAINING ALCOHOL: 0

## 2025-03-28 ASSESSMENT — PAIN DESCRIPTION - PAIN TYPE
TYPE: SURGICAL PAIN

## 2025-03-28 ASSESSMENT — PATIENT HEALTH QUESTIONNAIRE - PHQ9
2. FEELING DOWN, DEPRESSED, IRRITABLE, OR HOPELESS: NOT AT ALL
SUM OF ALL RESPONSES TO PHQ9 QUESTIONS 1 AND 2: 0
1. LITTLE INTEREST OR PLEASURE IN DOING THINGS: NOT AT ALL

## 2025-03-28 NOTE — ANESTHESIA PROCEDURE NOTES
Peripheral Block    Date/Time: 3/28/2025 2:13 PM    Performed by: Adriano Chan M.D.  Authorized by: Adriano Chan M.D.    Patient Location:  OR  Start Time:  3/28/2025 2:13 PM  Reason for Block: at surgeon's request and post-op pain management ONLY    patient identified, IV checked, site marked, risks and benefits discussed, surgical consent, monitors and equipment checked, pre-op evaluation and timeout performed    Patient Position:  Supine  Prep: ChloraPrep    Monitoring:  Heart rate, continuous pulse ox and cardiac monitor  Block Region:  Lower Extremity  Lower Extremity - Block Type:  Selective FEMORAL nerve block at the Adductor Canal    Laterality:  Right  Procedures: ultrasound guided  Image captured, interpreted and electronically stored.  Strength:  1 %  Dose:  3 ml  Block Type:  Single-shot  Needle Length:  100mm  Needle Gauge:  21 G  Needle Localization:  Ultrasound guidance  Ultrasound picture in chart  Injection Assessment:  Negative aspiration for heme, no paresthesia on injection, incremental injection and local visualized surrounding nerve on ultrasound  Evidence of intravascular injection: No     US Guided Selective Femoral Nerve Block at Adductor Canal:   US probe placed at mid-thigh level on externally rotated leg and femur identified.  Probe directed medially until Sartorius Muscle (SM), Femoral Artery (FA) and Saphenous Nerve (SN) identified in Adductor Canal (AC).  Needle inserted anterolateral to probe in an in plane approach into a subsartorial perivascular perineural position.  After negative aspiration LA injected with ease and visualized spreading within the AC.

## 2025-03-28 NOTE — ANESTHESIA PREPROCEDURE EVALUATION
Case: [id number] Date/Time: 03/28/25 1415    Procedure: REVISION RIGHT TOTAL KNEE ARTHROPLASTY, BOTH COMPONENTS, WITH PLACEMENT OF ARTICULATING ANTIBIOTIC SPACER (Right)    Diagnosis: Infection of total right knee replacement (HCC) [T84.53XA]    Pre-op diagnosis: Infection of total right knee replacement (HCC) [T84.53XA]    Location:  OR 06 / SURGERY [facility]    Surgeons: Cipriano Valdes M.D.            Relevant Problems   CARDIAC   (positive) HTN (hypertension)      Other   (positive) Osteoarthritis of right knee       Physical Exam    Airway   Mallampati: II  TM distance: >3 FB  Neck ROM: full       Cardiovascular - normal exam  Rhythm: regular  Rate: normal  (-) murmur     Dental - normal exam           Pulmonary - normal exam  Breath sounds clear to auscultation     Abdominal    Neurological - normal exam                   Anesthesia Plan    ASA 2       Plan - general and peripheral nerve block     Peripheral nerve block will be post-op pain control  Airway plan will be ETT          Induction: intravenous    Postoperative Plan: Postoperative administration of opioids is intended.    Pertinent diagnostic labs and testing reviewed    Informed Consent:    Anesthetic plan and risks discussed with patient.    Use of blood products discussed with: patient whom consented to blood products.

## 2025-03-28 NOTE — ANESTHESIA PROCEDURE NOTES
Airway    Date/Time: 3/28/2025 2:35 PM    Performed by: Adriano Chan M.D.  Authorized by: Adriano Chan M.D.    Location:  OR  Urgency:  Elective  Indications for Airway Management:  Anesthesia      Spontaneous Ventilation: absent    Sedation Level:  Deep  Preoxygenated: Yes    Patient Position:  Sniffing  Final Airway Type:  Endotracheal airway  Final Endotracheal Airway:  ETT  Cuffed: Yes    Technique Used for Successful ETT Placement:  Direct laryngoscopy    Insertion Site:  Oral  Blade Type:  Geo  Laryngoscope Blade/Videolaryngoscope Blade Size:  3  ETT Size (mm):  7.0  Measured from:  Teeth  ETT to Teeth (cm):  21  Placement Verified by: auscultation and capnometry    Cormack-Lehane Classification:  Grade IIa - partial view of glottis  Number of Attempts at Approach:  1

## 2025-03-28 NOTE — LETTER
March 25, 2025    Patient Name: Joshua Mckeon  Surgeon Name: Cipriano Valdes M.D.  Surgery Facility: [facility] ([address] [place])  Surgery Date: 3/28/2025    The time of your surgery is not final and may change up to and until the day of your surgery. You will be contacted 24-48 hours prior to your surgery date with your check-in and surgery time.    If you will not be at one of the below numbers please call the surgery scheduler at  [phone]  Preferred Phone: [phone]    BEFORE YOUR SURGERY   Pre Registration and/or Lab Work must be done within and no earlier than 28 days prior to your surgery date. Your scheduled facility will contact you regarding all required preregistration and/or lab work. If you have not been contacted within 7 days of your scheduled procedure please call [facility] at [phone] for an appointment as soon as possible.    Instructions: Bring a list of all medications you are taking including the dosing and frequency.    DAY OF YOUR SURGERY  Nothing to eat or drink after midnight     Refrain from smoking any substance after midnight prior to surgery. Smoking may interfere with the anesthetic and frequently produces nausea during the recovery period.    Continue taking all lifesaving medications. Including the morning of your surgery with small sip of water.    Please do NOT take on the day of surgery:  Diuretics: examples- furosemide (Lasix), spironolactone, hydrochlorothiazide  ACE-inhibitors: examples- lisinopril, ramipril, enalapril  “ARBs”: examples- losartan, Olmesartan, valsartan    Please arrive at the hospital/surgery center at the check-in time provided.     An adult will need to bring you and take you home after your surgery.     AFTER YOUR SURGERY  Post op Appointment:   Date: 4/08/25   Time: 1:45 PM   With: Cipriano Valdes MD    Location: [address] [place] [address] [place] [id number]    - No dental work for 3-6 months  after your surgery.  - You must have someone provide transportation post surgery and someone to monitor you for at least 24 hours post-surgery. If you don't have either of these your appointment will be canceled.     TIME OFF WORK  FMLA or Disability forms can be faxed directly to: [phone] or you may drop them off at [address] [place] [address]. Our office charges a $35.00 fee per form. Forms will be completed within 10 business days of drop off and payment received. For the status of your forms you may contact our disability office directly [phone].    MEDICATION INSTRUCTIONS **Please read section completely**  The following medications should be stopped a minimum of 10 days prior to surgery:  All over the counter, Supplements & Herbal medications    Anorectics: Phentermine (Adipex-P, Lomaira and Suprenza), Phentermine-topiramate (Qsymia), Bupropion-naltrexone (Contrave)    **If you are on Bupropion for anxiety/depression, please continue this medication up until the day of surgery.     Opioid Partial Agonists/Opioid Antagonists: Buprenorphine (Suboxone, Belbuca, Butrans, Probuphine Implant, Sublocade), Naltrexone (ReVia, Vivitrol), Naloxone    Amphetamines: Dextroamphetamine/Amphetamine (Adderall, Mydayis), Methylphenidate Hydrochloride (Concerta, Metadate, Methylin, Ritalin)    The following medications should be stopped 5 days prior to surgery:  Blood Thinners: Any Aspirin, Aspirin products, anti-inflammatories such as ibuprofen and any blood thinners such as Coumadin and Plavix. Please consult your prescribing physician if you are on life saving blood thinners, in regards to when to stop medications prior to surgery.     The following medications should be stopped a minimum of 3 days prior to surgery:  PDE-5 inhibitors: Sildenafil (Viagra), Tadalafil (Cialis), Vardenafil (Levitra), Avanafil (Stendra)    MAO Inhibitors: Rasagiline (Azilect), Selegiline (Eldepryl, Emsam, Selapar),  Isocarboxazid (Marplan), Phenelzine (Nardil)

## 2025-03-28 NOTE — INTERVAL H&P NOTE
Consented Procedure: REVISION RIGHT TOTAL KNEE ARTHROPLASTY, BOTH COMPONENTS, WITH PLACEMENT OF ARTICULATING ANTIBIOTIC SPACER  I have examined the patient, provided the risks, benefits, and alternatives to the procedure(s) indicated on the signed consent form, and the patient wishes to proceed.    H&P updated. Patient's symptoms have been slowly worsening since we spoke on 3/25. Otherwise the history is unchanged.     I have examined the patient.     Right knee  The knee is significantly swollen and erythematous. There is no wound drainage. There is no sinus.   ROM is 5 - 90, painful at limits of motion  Able to DF/PF ankle and great toe. SILT s/s/sp/dp/t, but diminished due to neuropathy (at baseline), palpable pedal pulse     I again discussed the above diagnosis with the patient and his wife. We reviewed both nonoperative and operative treatment options.  In regard to nonoperative treatment options, this would involve antibiotic therapy.  I did not recommend this treatment option given the low likelihood of infection eradication.  In terms of surgical treatment options, we discussed debridement with antibiotics and implant retention (DAIR) and two-stage exchange.  I discussed that DAIR would involve only 1 surgery with faster recovery, but lower likelihood of eradicating the infection. Because he has had symptoms for over 3 weeks and given his preference for the treatment with the highest chance of infection eradication, I did not recommend this operative treatment method.  Instead, I have recommended a two-stage exchange.  In the first operation we will remove his current implants, debride and irrigate the joint, and place an articulating antibiotic spacer.  He will be on IV antibiotics specific to his organism for at least 6 weeks.  After a drug holiday of at least 2 weeks, we will repeat inflammatory marker analysis and likely repeat aspiration to ensure infection eradication.  Once eradication is confirmed,  we would proceed with removal of the spacer and reimplantation of a permanent prosthesis.  Despite the multiple procedures required and prolonged recovery, this has the best chance at long-term success, which is the patient's preference and he understands the longer recovery involved.  The patient was in agreement and wishes to proceed with two-stage exchange arthroplasty.      The patient understood the surgical risks including but not limited to pain, scarring, bleeding, skin numbness, muscle weakness, limb length discrepancy, fracture, dislocation, persistent or recurrent infection, osteolysis, metal hypersensitivity, loosening, stiffness (meaning loss of flexion and/or loss of extension) requiring closed and/or open manipulation and/or revision, implant wear and implant failure, failure of the operation to provide symptomatic relief, nerve/vessel/ muscle/tendon/bone damage that may be permanent, seroma, hematoma, wound problems, need for allogenic blood transfusion (especially given pre-op anemia), deep vein thrombosis with pulmonary embolism, need for reoperation/multiple operations or procedures if complications occur, and the risks of regional and/or general anesthesia, including heart attack, stroke and even death, among others. The patient also understood that a good result is not guaranteed and that poor outcomes can at times be unavoidable. We also discussed the hospital stay, postoperative rehab and follow up required. The patient was in a position to make informed decisions and their care, confirmed their understanding of the risks as well as the benefits of surgery and wished to proceed with surgery.      Planned operation: Revision right total knee arthroplasty, both components, placement of articulating antibiotic spacer      Cipriano Valdes M.D.  03/28/25 2:04 PM

## 2025-03-29 LAB
ANION GAP SERPL CALC-SCNC: 10 MMOL/L (ref 7–16)
BACTERIA SPEC ANAEROBE CULT: NORMAL
BUN SERPL-MCNC: 16 MG/DL (ref 8–22)
CALCIUM SERPL-MCNC: 8.1 MG/DL (ref 8.5–10.5)
CHLORIDE SERPL-SCNC: 101 MMOL/L (ref 96–112)
CO2 SERPL-SCNC: 28 MMOL/L (ref 20–33)
CREAT SERPL-MCNC: 1.2 MG/DL (ref 0.5–1.4)
ERYTHROCYTE [DISTWIDTH] IN BLOOD BY AUTOMATED COUNT: 50.2 FL (ref 35.9–50)
GFR SERPLBLD CREATININE-BSD FMLA CKD-EPI: 68 ML/MIN/1.73 M 2
GLUCOSE SERPL-MCNC: 213 MG/DL (ref 65–99)
GRAM STN SPEC: NORMAL
HCT VFR BLD AUTO: 32.9 % (ref 42–52)
HGB BLD-MCNC: 10.6 G/DL (ref 14–18)
MCH RBC QN AUTO: 31.5 PG (ref 27–33)
MCHC RBC AUTO-ENTMCNC: 32.2 G/DL (ref 32.3–36.5)
MCV RBC AUTO: 97.9 FL (ref 81.4–97.8)
PLATELET # BLD AUTO: 256 K/UL (ref 164–446)
PMV BLD AUTO: 9.9 FL (ref 9–12.9)
POTASSIUM SERPL-SCNC: 4.8 MMOL/L (ref 3.6–5.5)
RBC # BLD AUTO: 3.36 M/UL (ref 4.7–6.1)
SCCMEC + MECA PNL NOSE NAA+PROBE: NEGATIVE
SIGNIFICANT IND 70042: NORMAL
SITE SITE: NORMAL
SODIUM SERPL-SCNC: 139 MMOL/L (ref 135–145)
SOURCE SOURCE: NORMAL
VANCOMYCIN TIMED 2584: 19.9 UG/ML
WBC # BLD AUTO: 11.5 K/UL (ref 4.8–10.8)

## 2025-03-29 PROCEDURE — 99255 IP/OBS CONSLTJ NEW/EST HI 80: CPT | Performed by: INTERNAL MEDICINE

## 2025-03-29 PROCEDURE — A9270 NON-COVERED ITEM OR SERVICE: HCPCS | Performed by: STUDENT IN AN ORGANIZED HEALTH CARE EDUCATION/TRAINING PROGRAM

## 2025-03-29 PROCEDURE — 97166 OT EVAL MOD COMPLEX 45 MIN: CPT

## 2025-03-29 PROCEDURE — 700105 HCHG RX REV CODE 258: Performed by: INTERNAL MEDICINE

## 2025-03-29 PROCEDURE — 700105 HCHG RX REV CODE 258: Performed by: STUDENT IN AN ORGANIZED HEALTH CARE EDUCATION/TRAINING PROGRAM

## 2025-03-29 PROCEDURE — 700111 HCHG RX REV CODE 636 W/ 250 OVERRIDE (IP): Performed by: INTERNAL MEDICINE

## 2025-03-29 PROCEDURE — 85027 COMPLETE CBC AUTOMATED: CPT

## 2025-03-29 PROCEDURE — 99024 POSTOP FOLLOW-UP VISIT: CPT | Performed by: STUDENT IN AN ORGANIZED HEALTH CARE EDUCATION/TRAINING PROGRAM

## 2025-03-29 PROCEDURE — 94760 N-INVAS EAR/PLS OXIMETRY 1: CPT

## 2025-03-29 PROCEDURE — 700102 HCHG RX REV CODE 250 W/ 637 OVERRIDE(OP): Performed by: STUDENT IN AN ORGANIZED HEALTH CARE EDUCATION/TRAINING PROGRAM

## 2025-03-29 PROCEDURE — 700101 HCHG RX REV CODE 250: Performed by: STUDENT IN AN ORGANIZED HEALTH CARE EDUCATION/TRAINING PROGRAM

## 2025-03-29 PROCEDURE — 80048 BASIC METABOLIC PNL TOTAL CA: CPT

## 2025-03-29 PROCEDURE — 770001 HCHG ROOM/CARE - MED/SURG/GYN PRIV*

## 2025-03-29 PROCEDURE — 36415 COLL VENOUS BLD VENIPUNCTURE: CPT

## 2025-03-29 PROCEDURE — 700111 HCHG RX REV CODE 636 W/ 250 OVERRIDE (IP): Mod: JZ | Performed by: STUDENT IN AN ORGANIZED HEALTH CARE EDUCATION/TRAINING PROGRAM

## 2025-03-29 PROCEDURE — 80202 ASSAY OF VANCOMYCIN: CPT

## 2025-03-29 RX ADMIN — CELECOXIB 200 MG: 200 CAPSULE ORAL at 05:23

## 2025-03-29 RX ADMIN — PIPERACILLIN AND TAZOBACTAM 3.38 G: 3; .375 INJECTION, POWDER, FOR SOLUTION INTRAVENOUS at 12:39

## 2025-03-29 RX ADMIN — OXYCODONE 5 MG: 5 TABLET ORAL at 21:18

## 2025-03-29 RX ADMIN — OMEPRAZOLE 20 MG: 20 CAPSULE, DELAYED RELEASE ORAL at 05:24

## 2025-03-29 RX ADMIN — ACETAMINOPHEN 1000 MG: 500 TABLET ORAL at 17:43

## 2025-03-29 RX ADMIN — ACETAMINOPHEN 1000 MG: 500 TABLET ORAL at 12:37

## 2025-03-29 RX ADMIN — ASPIRIN 81 MG: 81 TABLET, COATED ORAL at 17:45

## 2025-03-29 RX ADMIN — DOCUSATE SODIUM 100 MG: 100 CAPSULE, LIQUID FILLED ORAL at 05:24

## 2025-03-29 RX ADMIN — DILTIAZEM HYDROCHLORIDE 300 MG: 300 CAPSULE, EXTENDED RELEASE ORAL at 05:22

## 2025-03-29 RX ADMIN — ALLOPURINOL 300 MG: 300 TABLET ORAL at 05:23

## 2025-03-29 RX ADMIN — PIPERACILLIN AND TAZOBACTAM 3.38 G: 3; .375 INJECTION, POWDER, FOR SOLUTION INTRAVENOUS at 05:27

## 2025-03-29 RX ADMIN — ASPIRIN 81 MG: 81 TABLET, COATED ORAL at 05:24

## 2025-03-29 RX ADMIN — ACETAMINOPHEN 1000 MG: 500 TABLET ORAL at 05:23

## 2025-03-29 RX ADMIN — VANCOMYCIN HYDROCHLORIDE 1500 MG: 5 INJECTION, POWDER, LYOPHILIZED, FOR SOLUTION INTRAVENOUS at 08:00

## 2025-03-29 RX ADMIN — ATORVASTATIN CALCIUM 40 MG: 40 TABLET, FILM COATED ORAL at 17:45

## 2025-03-29 RX ADMIN — METOPROLOL SUCCINATE 200 MG: 100 TABLET, EXTENDED RELEASE ORAL at 05:23

## 2025-03-29 RX ADMIN — DOCUSATE SODIUM 100 MG: 100 CAPSULE, LIQUID FILLED ORAL at 18:00

## 2025-03-29 RX ADMIN — CEFAZOLIN 2 G: 2 INJECTION, POWDER, FOR SOLUTION INTRAMUSCULAR; INTRAVENOUS at 16:00

## 2025-03-29 ASSESSMENT — ENCOUNTER SYMPTOMS
VOMITING: 0
SPUTUM PRODUCTION: 0
WEAKNESS: 0
ABDOMINAL PAIN: 0
DIARRHEA: 0
COUGH: 0
NERVOUS/ANXIOUS: 0
DOUBLE VISION: 0
CHILLS: 0
NAUSEA: 0
CONSTIPATION: 0
MYALGIAS: 1
FEVER: 0
SHORTNESS OF BREATH: 0
BLURRED VISION: 0

## 2025-03-29 ASSESSMENT — PAIN DESCRIPTION - PAIN TYPE
TYPE: SURGICAL PAIN
TYPE: ACUTE PAIN
TYPE: SURGICAL PAIN

## 2025-03-29 ASSESSMENT — COGNITIVE AND FUNCTIONAL STATUS - GENERAL
DRESSING REGULAR LOWER BODY CLOTHING: A LITTLE
DAILY ACTIVITIY SCORE: 22
SUGGESTED CMS G CODE MODIFIER DAILY ACTIVITY: CJ
HELP NEEDED FOR BATHING: A LITTLE

## 2025-03-29 ASSESSMENT — ACTIVITIES OF DAILY LIVING (ADL): TOILETING: INDEPENDENT

## 2025-03-29 NOTE — PROGRESS NOTES
"Pharmacy Vancomycin Kinetics Note for 3/28/2025     64 y.o. male on Vancomycin day # 1     Vancomycin Indication (AUC Dosing): right prosthetic knee infection      Provider specified end date: 04/02/25    Active Antibiotics (From admission, onward)      Ordered     Ordering Provider       Fri Mar 28, 2025  6:30 PM    03/28/25 1830  vancomycin 2750 mg/500mL NS IVPB premix  (vancomycin (VANCOCIN) IV (LD + Maintenance))  ONCE         Cipriano Valdes M.D.       Fri Mar 28, 2025  6:20 PM    03/28/25 1820  MD Alert...Vancomycin per Pharmacy  (MD Alert...Vancomycin per Pharmacy)  PHARMACY TO DOSE        Question:  Indication(s) for vancomycin?  Answer:  Other (comments)  Comment:  TKA PJI    Cipriano Valdes M.D.    03/28/25 1820  piperacillin-tazobactam (Zosyn) 3.375 g in  mL IVPB  (piperacillin-tazobactam (ZOSYN) IV (Extended-infusion) PANEL )  ONCE        Placed in \"And\" Linked Group    Cipriano Valdes M.D.    03/28/25 1820  piperacillin-tazobactam (Zosyn) 3.375 g in  mL IVPB  (piperacillin-tazobactam (ZOSYN) IV (Extended-infusion) PANEL )  EVERY 8 HOURS        Placed in \"And\" Linked Group    Cipriano Valdes M.D.       Fri Mar 28, 2025  4:30 PM    03/28/25 1630  vancomycin (Vancocin) injection  INTRA-OP CONTINUOUS         Cipriano Valdes M.D.            Dosing Weight: 113 kg (249 lb 1.9 oz)      Admission History: Admitted on 3/28/2025 for Infection of total right knee replacement ([facility]) [T84.53XA]  Infection of total right knee replacement, initial encounter ([facility]) [T84.53XA]  Pertinent history: 63 yo on day 1 of vancomycin and Zosyn for right prosthetic knee infection. Joint hardware removed today with placement of spacer. Cultures obtained intra-op. SCr is at baseline.    Allergies:     Lisinopril     Pertinent cultures to date:     Results       Procedure Component Value Units Date/Time    MRSA By PCR (Amp) [[id number]]     Order Status: No result Specimen: Respirate from Nares     CULTURE TISSUE W/ GRM STAIN " [[id number]] Collected: 03/28/25 1518    Order Status: No result Specimen: Tissue Updated: 03/28/25 1805     Significant Indicator NEG     Source TISS     Site RIGHT KNEE 3     Culture Result -     Gram Stain Result -    Narrative:      RIGHT KNEE 3    Anaerobic Culture [[id number]] Collected: 03/28/25 1518    Order Status: No result Specimen: Tissue Updated: 03/28/25 1805     Significant Indicator NEG     Source TISS     Site RIGHT KNEE 3     Culture Result -    Narrative:      RIGHT KNEE 3    Anaerobic Culture [[id number]] Collected: 03/28/25 1518    Order Status: No result Specimen: Tissue Updated: 03/28/25 1804     Significant Indicator NEG     Source TISS     Site RIGHT KNEE 2     Culture Result -    CULTURE TISSUE W/ GRM STAIN [[id number]] Collected: 03/28/25 1518    Order Status: No result Specimen: Tissue Updated: 03/28/25 1804     Significant Indicator NEG     Source TISS     Site RIGHT KNEE 2     Culture Result -     Gram Stain Result -    Anaerobic Culture [[id number]] Collected: 03/28/25 1518    Order Status: No result Specimen: Tissue Updated: 03/28/25 1803     Significant Indicator NEG     Source TISS     Site RIGHT KNEE 1     Culture Result -    Narrative:      RIGHT KNEE 1    CULTURE TISSUE W/ GRM STAIN [[id number]] Collected: 03/28/25 1518    Order Status: No result Specimen: Tissue Updated: 03/28/25 1803     Significant Indicator NEG     Source TISS     Site RIGHT KNEE 1     Culture Result -     Gram Stain Result -    Narrative:      RIGHT KNEE 1    Anaerobic Culture [[id number]] Collected: 03/28/25 1518    Order Status: No result Specimen: Synovial Updated: 03/28/25 1759     Significant Indicator NEG     Source SYNO     Site -     Culture Result -    Narrative:      synovial fluid    FLUID CULTURE W/GRAM STAIN [[id number]] Collected: 03/28/25 1518    Order Status: No result Specimen: Synovial Updated: 03/28/25 1759     Significant Indicator NEG     Source SYNO     Site -     Culture Result -     Gram  "Stain Result -    Narrative:      synovial fluid            Labs:     Estimated Creatinine Clearance: 120.9 mL/min (by C-G formula based on SCr of 0.81 mg/dL).  Recent Labs     25  1155   WBC 10.4     Recent Labs     25  1155   BUN 9   CREATININE 0.81   ALBUMIN 3.3       Intake/Output Summary (Last 24 hours) at 3/28/2025 1838  Last data filed at 3/28/2025 1823  Gross per 24 hour   Intake 1040 ml   Output 150 ml   Net 890 ml      BP (!) 159/82   Pulse 69   Temp 36.3 °C (97.3 °F) (Temporal)   Resp (!) 22   Ht 1.803 m (5' 11\")   Wt 113 kg (249 lb 10.7 oz)   SpO2 91%  Temp (24hrs), Av.2 °C (97.1 °F), Min:36 °C (96.8 °F), Max:36.3 °C (97.3 °F)      List concerns for Vancomycin clearance:     Obesity;Nephrotoxic drugs    Pharmacokinetics:     AUC kinetics:   Ke (hr ^-1): 0.1048 hr^-1  Half life: 6.61 hr  Clearance: 5.669  Estimated TDD: 2834.5  Estimated Dose: 1016  Estimated interval: 8.6      A/P:     -  Vancomycin dose: 2.75 gm loading dose followed by 1.5 gm q12h. 6 gm of vancomycin on MAR were used for spacer. None administered via IV intra-operatively    -  Next vancomycin level(s):    -3/31  @ 0000   -3/31 Vt @ 0730     -  Predicted vancomycin AUC from initial AUC test calculator: 529 mg·hr/L      -  Comments: Pharmacy to follow renal function and if remains stable anticipate evaluating levels after 4th maintenance dose. Pending ID consult. Pharmacy to follow microbiology for stewardship opportunity.    Tobi Tejada, PharmD    "

## 2025-03-29 NOTE — PROGRESS NOTES
ID: Joshua Mckeon is a 64 y.o. male who underwent revision right TKA (explant and articulating abx spacer placement) on 3/28/25    Subjective/Interval:  No acute events overnight.  Pain is well-controlled on current regimen.  Denies chest pain, shortness of breath, nausea, vomiting.  Urinating freely.  Tolerating orals.     Objective:  Vitals:    25 0637   BP:    Pulse:    Resp:    Temp:    SpO2: 97%      Gen: No acute distress.  Alert and oriented x 3.  Operative extremity:  Prevena intact, holding suction  Hinged knee brace in place, locked in extension  RLE: +DF/PF/EHL SILT s/s/sp/dp/t (at baseline)  No signs of DVT  Toes warm/well perfused    Labs:  H/H: 10.6/32.9    Assessment and plan:  Recovering as expected s/p above operation    Weight bearin% WB RLE in HKB locked in extension  PT/OT  Dressing: Maintain prevena  ID: follow cultures, vanc/zosyn pending ID recs  DVT: SCDs/ASA 81 BID  Dispo: Pending ID consult, PICC line, and setup for outpatient IV abx  Follow-up: Please follow up with Dr. Valdes's office 2 weeks after surgery for wound check. Call [phone] to confirm or make appointment    Cipriano Valdes M.D.

## 2025-03-29 NOTE — ANESTHESIA TIME REPORT
Anesthesia Start and Stop Event Times       Date Time Event    3/28/2025 1246 Ready for Procedure     1411 Anesthesia Start     1751 Anesthesia Stop          Responsible Staff  03/28/25      Name Role Begin End    Adriano Chan M.D. Anesth 1411 1751          Overtime Reason:  no overtime (within assigned shift)    Comments:

## 2025-03-29 NOTE — CARE PLAN
The patient is Stable - Low risk of patient condition declining or worsening    Shift Goals  Clinical Goals: Free from falls or injuries, rest and sleep at least 4 hours overnight, pain controlled at 3/10 or better with current pain regimen  Patient Goals: Free from falls or injuries, rest and sleep at least 4 hours overnight, pain controlled at 3/10 or better with current pain regimen    Progress made toward(s) clinical / shift goals: No falls or injuries overnight, rested and slept at least 4 hours overnight, patient's pain controlled at 3/10 or better overnight with current pain regimen    Patient is not progressing towards the following goals:

## 2025-03-29 NOTE — OP REPORT
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  Relief Circulator: Adams Henderson R.N.  Relief Scrub: Germaine Vasquez  Scrub Person: Grace Argueta  Anesthesia Assistant: Sarai Sampson    Anesthesia Staff:  Anesthesiologist: Adriano Chan M.D.    Anesthesia Type: General    Specimens:  Synovial fluid  Tissue cultures x 3    Estimated Blood Loss:  250cc    Implants:  [employer] CR femur size 6, 21 mm deep dish polyethylene insert, 32 mm circular patella  Large threaded Steinmann pin cut in 2    Findings: Gross purulence of the right the knee. No sinus formation. ROM 5-100. No significant implant wear. Extensive synovitis throughout the knee. Implants were well fixed, but there were the beginnings of undermining of the tibial component concerning for more chronic infection. Minimal bone loss after implant removal.     Clinical History:  The patient has a history of a right total knee arthroplasty by my partner, Dr. Garcia, in 2022, which was well functioning until about 4 weeks ago since which time he has had increasingly severe pain. He underwent aspiration on 3/24/2025 which showed  96k cells, 88% PMNs, and grew group B Streptococcus, consistent with infection.     We reviewed both nonoperative and operative treatment options pre-operatively.  Nonoperative treatment would have involved antibiotic therapy.  I did not recommend this treatment option given the low likelihood of infection  eradication.  In terms of surgical treatment options, we discussed debridement with antibiotics and implant retention (DAIR) and two-stage exchange.  I discussed that DAIR would involve only 1 surgery with faster recovery, but lower likelihood of eradicating the infection. Because he has had symptoms for over nearly 4 weeks and given his preference for the treatment with the highest chance of infection eradication, I did not recommend this operative treatment method.  Rather, I recommended a two-stage exchange and explained the details of this as noted in my pre-operative note.  Despite the multiple procedures required and prolonged recovery, this has the best chance at long-term infection eradication, which was the patient's preference and he understood the longer recovery involved.       The patient understood the surgical risks including but not limited to pain, scarring, bleeding, skin numbness, muscle weakness, limb length discrepancy, fracture, dislocation, persistent or recurrent infection, osteolysis, metal hypersensitivity, loosening, stiffness (meaning loss of flexion and/or loss of extension) requiring closed and/or open manipulation and/or revision, implant wear and implant failure, failure of the operation to provide symptomatic relief, nerve/vessel/ muscle/tendon/bone damage that may be permanent, seroma, hematoma, wound problems, need for allogenic blood transfusion (especially given pre-op anemia), deep vein thrombosis with pulmonary embolism, need for reoperation/multiple operations or procedures if complications occur, and the risks of regional and/or general anesthesia, including heart attack, stroke and even death, among others. The patient also understood that a good result is not guaranteed and that poor outcomes can at times be unavoidable. We also discussed the hospital stay, postoperative rehab and follow up required. The patient was in a position to make informed decisions and their care,  confirmed their understanding of the risks as well as the benefits of surgery and wished to proceed with surgery.     Description of procedure:  Informed consent was obtained. The operative site was marked. The patient was transferred to the operating room. A combined spinal epidural anesthetic and adductor canal block were performed. The patient received preoperative tranexamic acid. Antibiotics were were initially held to allow for collection of cultures.     The right leg was prepped and draped in standard sterile fashion. A formal timeout was performed confirming the operative site. Gravity was used to exsanguinate the leg and the tourniquet was inflated to 250 mmHg.    A longitudinal incision made using the prior incision and extended as needed. Soft tissue was dissected sharply down to the extensor mechanism. A medial parapatellar arthrotomy was performed to expose the knee joint. Significant purulent material was encountered upon entering the joint as well as extensive synovitis. Soft tissue was elevated off the proximal medial tibia for exposure. A radical synovectomy was performed including the medial gutter, lateral gutter, anterior femur, PCL, and patellar fat pad/scar. Joint fluid and multiple deep tissue cultures were sent for culture. Antibiotics were then given. The size 10 mm polyethylene insert was removed using an osteotome.    Attention was then turned to the femur. The femoral component was found to be well-fixed. The ACL saw blade was used to divide the interface between the cement and the implant. The femoral component was then removed using a bone tamp with minimal bone loss. Cement was removed from the distal femur. The femoral canal was opened and a freshening distal femoral cut was made. A size 6 (matching the explanted component) 4-in-1 guide was placed and freshening cuts were made.     Attention was then turned to the tibia. The tibial component was found to be well-fixed, but with some  peripheral undermining concerning for more chronic infection. The ACL saw blade along with small osteotomes are used to divide interface during the cement and implant. The tibial component was then removed using a combination of osteotomes and a bone tamp. Remainder of cement was removed using curettes, pituitary, a high speed bur, and a rongeur.     Attention was turned to the patellar component, which was well-fixed. This was removed with a saw, including the cement. Remnant pegs were removed with the bur. The tourniquet was deflated at this point, 90 minutes.     Next attention was turned towards reaming the canals. Sequential reaming was performed up to a size 11 mm for the tibia and femur, primarily for debridement. An intramedullary cutting guide was used to make a freshening up cut of the tibia 90° to the mechanical axis. The rest of the debridement was then completed including the posterior capsule and any remaining friable and infected appearing tissue. This was followed by irrigation with 1 L of dilute betadine, 3L of saline, 1 L of 1.5% hydrogen peroxide, and another 3L of saline. Gloves were changed and a new drape was placed.     Attention was then turned to trialing. A size 6 femoral trial, 21 mm tibial insert, and 32 mm circular patellar component were trialed. Stability was checked and was acceptable to varus and valgus stress in extension and mid-flexion as well as AP stress in flexion. ROM was 0 - 120. Patellar tracking was appropriate.     Trial components were then removed and all bony surfaces were irrigated and dried. Implants were opened and attention was turned towards cementation. A large steinmann pin was cut in half. One half was place into the insert. I used a bur to roughen the undersurface for cement interdigitation. The antibiotic cement was mixed in 3 batches. Each batch included 1 bag of Simplex HV cement, 2 grams of vancomycin, 2.4 grams of tobramycin, and a small amount of  methylene blue. With the first batch, the steinmann pins were coated and allowed to cure completely. With the second batch, the tibial component was cemented into place and allowed to cure completely. The femoral and patellar components were cemented with the final batch and allowed to cure completely.  The knee was again taken through range of motion found to have appropriate stability. The knee was irrigated with betadine solution and then saline pulsatile lavage.    At this point we began our closure. Several zero PDS sutures were used for the closure of the arthrotomy followed by a #1 Quill. Subcutaneous tissue was closed in layers with 0 PDS followed by 2-0 PDS and skin was closed with 3-0 Nylon. A prevena negative pressure wound vac was applied and a gentle compressive wrap. The knee was placed in a hinge knee brace locked in extension. At the end of the case all needle and instrument counts were correct. There were no complications. The patient was transferred to the recovery room in stable condition.    Post-operative Plan:  - 50% WB RLE  - Hinge knee brace locked in extension for 5 days for soft tissue rest  - ID consult, broad spectrum antibiotics pending their recommendations  - ASA 81 BID DVT ppx    Cipriano Valdes M.D.     Date: 3/28/2025  Time: 7:55 PM

## 2025-03-29 NOTE — THERAPY
Occupational Therapy   Initial Evaluation     Patient Name: Joshua Mckeon  Age:  64 y.o., Sex:  male  Medical Record #: [id number]  Today's Date: 3/29/2025     Precautions: (P) Partial Weight Bearing Right Lower Extremity (See Comments for Percentage), Immobilizer Right Lower Extremity, Fall Risk  Comments: (P) 50% PWB RLE. HKB locked in extension to RLE.    Assessment  Patient is 64 y.o. male s/p Revision R TKA, POD 1. Additional factors influencing patient status / progress: Pt lives with spouse in [place]; works as a . Pt  is A&Ox4, pleasant and cooperative. Performs most ADL's with SBA; CGA with LB dressing. Reviewed AE use; pt aware. Maintains PWB RLE during functional mobility; using FWW. Good safety. See below for CLOF. No further acute OT needs. Awaiting PICC placement, cultures.        Plan  1x only OT eval  DC Equipment Recommendations: (P) Front-Wheel Walker  Discharge Recommendations: (P) Anticipate that the patient will have no further occupational therapy needs after discharge from the hospital     Subjective  Agreeable     Objective   03/29/25 1219   Prior Living Situation   Prior Services None   Housing / Facility 2 Story House   Steps Into Home 0   Steps In Home   (flight)   Bathroom Set up Walk In Shower;Grab Bars;Shower Chair   Equipment Owned Crutches;Single Point Cane;Tub / Shower Seat;Grab Bar(s) In Tub / Shower;Reacher   Lives with - Patient's Self Care Capacity Spouse   Comments Lives in [place]. Master BR on 1st floor. Spouse available to assist upon dc.   Prior Level of ADL Function   Self Feeding Independent   Grooming / Hygiene Independent   Bathing Independent   Dressing Independent   Toileting Independent   Prior Level of IADL Function   Medication Management Independent   Laundry Independent   Kitchen Mobility Independent   Finances Independent   Home Management Independent   Shopping Independent   Prior Level Of Mobility Independent Without Device in Home   Driving /  Transportation Driving Independent   Occupation (Pre-Hospital Vocational) Employed Full Time;Requires Physical Labor   Leisure Interests Hobbies   Comments    Precautions   Precautions Partial Weight Bearing Right Lower Extremity (See Comments for Percentage);Immobilizer Right Lower Extremity;Fall Risk   Comments 50% PWB RLE. HKB locked in extension to RLE.   Vitals   O2 Delivery Device None - Room Air   Pain 0 - 10 Group   Location Knee   Location Orientation Right   Therapist Pain Assessment Post Activity Pain Same as Prior to Activity;0   Cognition    Cognition / Consciousness WDL   Level of Consciousness Alert   Comments Ox4   Passive ROM Upper Body   Passive ROM Upper Body WDL   Active ROM Upper Body   Active ROM Upper Body  WDL   Strength Upper Body   Upper Body Strength  WDL   Sensation Upper Body   Upper Extremity Sensation  WDL   Upper Body Muscle Tone   Upper Body Muscle Tone  WDL   Coordination Upper Body   Coordination WDL   Balance Assessment   Sitting Balance (Static) Good   Sitting Balance (Dynamic) Fair +   Standing Balance (Static) Good   Standing Balance (Dynamic) Fair +   Weight Shift Sitting Good   Weight Shift Standing Fair   Bed Mobility    Supine to Sit Standby Assist   ADL Assessment   Eating Independent   Grooming Standby Assist;Standing   Bathing   (wife helped pt with sponge bath)   Upper Body Dressing Independent   Lower Body Dressing Contact Guard Assist  (underwear)   Toileting Supervision   How much help from another person does the patient currently need...   Putting on and taking off regular lower body clothing? 3   Bathing (including washing, rinsing, and drying)? 3   Toileting, which includes using a toilet, bedpan, or urinal? 4   Putting on and taking off regular upper body clothing? 4   Taking care of personal grooming such as brushing teeth? 4   Eating meals? 4   6 Clicks Daily Activity Score 22   Functional Mobility   Sit to Stand Supervised   Bed, Chair,  Wheelchair Transfer Standby Assist   Toilet Transfers Standby Assist   Transfer Method Stand Step   Comments FWW. Pt able to maintain 50% PWB RLE.   Visual Perception   Visual Perception  WDL   Activity Tolerance   Sitting in Chair post session   Sitting Edge of Bed 7   Standing 5   Education Group   Education Provided Adaptive Equipment   Role of Occupational Therapist Patient Response Patient;Acceptance;Explanation;Verbal Demonstration   Adaptive Equipment Patient Response Patient;Acceptance;Explanation;Verbal Demonstration   Anticipated Discharge Equipment and Recommendations   DC Equipment Recommendations Front-Wheel Walker   Discharge Recommendations Anticipate that the patient will have no further occupational therapy needs after discharge from the hospital   Interdisciplinary Plan of Care Collaboration   IDT Collaboration with  Family / Caregiver;Nursing   Patient Position at End of Therapy Seated;Call Light within Reach;Tray Table within Reach;Phone within Reach   Collaboration Comments GEOFFREY Maurer

## 2025-03-29 NOTE — PROGRESS NOTES
"Pharmacy Kinetics 64 y.o. male on vancomycin day # 2 3/29/2025    Currently on Vancomycin 1500 mg IV q12hr ()  Provider specified end date: per ID    Indication for Treatment: R PJI    Other antibiotics: pip/tazo    Allergies: Lisinopril     List concerns for renal function (obesity    Pertinent cultures to date:   3/24 Synovial fluids GBS  3/28 OR cultures in process  MRSA nares neg    Recent Labs     25  1155 25  0245   WBC 10.4 11.5*     Recent Labs     25  1155 25  0245   BUN 9 16   CREATININE 0.81 1.20   ALBUMIN 3.3  --      Recent Labs     25  0752   VANCORANDOM 19.9     Intake/Output Summary (Last 24 hours) at 3/29/2025 1404  Last data filed at 3/29/2025 0900  Gross per 24 hour   Intake 1480 ml   Output 450 ml   Net 1030 ml      BP (!) 144/78   Pulse 71   Temp 36.9 °C (98.5 °F) (Oral)   Resp 17   Ht 1.803 m (5' 11\")   Wt 113 kg (249 lb 10.7 oz)   SpO2 93%  Temp (24hrs), Av.3 °C (97.4 °F), Min:36 °C (96.8 °F), Max:36.9 °C (98.5 °F)      A/P   Vancomycin dose change: empirically reducing dose to 1000 mg IV q12h  Next vancomycin level: 3/30 0730  Goal trough: 15-20 mcg/mL  Comments: large increase in SCr from yesterday.  12-hour level following loading dose 19.9 mcg/mL.  BMP ordered for tomorrow along with surveillance level prior to morning vancomycin dose.  ID consulting pending.     Iker Stein. PharmD    "

## 2025-03-29 NOTE — OR NURSING
1747 Pt arrived to PACU from OR. VSS. OPA with 6L Simple mask in place. Pt is resting comfortably. Non-labored respirations. Pt is spontaneous breathing.   Surgical dressing with knee immobilizer placed in OR, CDI. Cloth covered ice pack in place.   SCDs in place. No symptoms or complaints regarding pain or nausea at this time.    1804 Called Xray     1809 Pt is tolerating sips.     1812 Called wife. Caitlin for updates.     1839 Pt meets criteria for hospital room transfer. Report called to BRENDA Silvestre.  Pain is 5/10, and tolerable.    1842 Pt moved to hospital room. All personal belongings with pt.

## 2025-03-29 NOTE — CONSULTS
Consults  INFECTIOUS DISEASES INPATIENT CONSULT NOTE     Date of Service: 3/29/2025    Consult Requested By: Cipriano Valdes M.D.    Reason for Consultation: Right knee prosthetic joint infection    History of Present Illness:   Joshua Mckeon is a 64 y.o.  admitted 3/28/2025. Pt has a past medical history of total right knee arthroplasty in 2022.  He had been doing well until approxi-1 month ago when he began to have increasing pain and swelling secondary to an injury.  This progressed and he came into orthopedics for aspiration of the knee with reported WBCs of 96K, 8% polys.  He is admitted for orthopedic surgery.    Review Of Systems:  Review of Systems   Constitutional:  Positive for malaise/fatigue. Negative for chills and fever.   HENT:  Negative for hearing loss.    Eyes:  Negative for blurred vision and double vision.   Respiratory:  Negative for cough, sputum production and shortness of breath.    Cardiovascular:  Negative for chest pain.   Gastrointestinal:  Negative for abdominal pain, constipation, diarrhea, nausea and vomiting.   Genitourinary:  Negative for dysuria.   Musculoskeletal:  Positive for joint pain and myalgias.   Skin:  Negative for rash.   Neurological:  Negative for weakness.   Psychiatric/Behavioral:  The patient is not nervous/anxious.        PMH:   Past Medical History:   Diagnosis Date    Arthritis 2017    Been treated for thsi    Cataract Both eyes    Has both eyes done for cataracts    Delayed emergence from general anesthesia 2019    His last knee surgery it took a bit for nate to wake from hsi surgery    Heart burn 2006    Being treated for this with Omeprazole    High cholesterol 2006    Being treated for this    Hypertension     Snoring Has been for a lot of years    Seems to be ok with this       PSH:  Past Surgical History:   Procedure Laterality Date    PB TOTAL KNEE ARTHROPLASTY  01/31/2022    Procedure: RIGHT TOTAL KNEE ARTHROPLASTY;  Surgeon: Lloyd Garcia M.D.;   Location: [facility];  Service: Orthopedics    LAKISHA BY LAPAROSCOPY  03/13/2018    Procedure: LAKISHA BY LAPAROSCOPY;  Surgeon: James Dill M.D.;  Location: SUR[facility]ERY [facility];  Service: General    ERCP IN OR  03/12/2018    Procedure: ERCP IN OR;  Surgeon: Victorino Luis M.D.;  Location: SURGERY [facility];  Service: Gastroenterology    OTHER  2017    Gall bladder extraction       FAMILY HX:  Family History   Problem Relation Age of Onset    Breast Cancer Mother         Is cured from her breast cancer 3x years    Heart Disease Father         Past aways from a heart attack att he age of 57    Hypertension Father     Hyperlipidemia Father     Heart Disease Paternal Uncle         Had triple bypass on his heart     Reviewed family history. No pertinent family history.     SOCIAL HX:  Social History     Socioeconomic History    Marital status:      Spouse name: Not on file    Number of children: Not on file    Years of education: Not on file    Highest education level: Not on file   Occupational History    Not on file   Tobacco Use    Smoking status: Never    Smokeless tobacco: Current     Types: Chew    Tobacco comments:     Chews tobacco   Vaping Use    Vaping status: Never Used   Substance and Sexual Activity    Alcohol use: Yes     Alcohol/week: 1.8 - 2.4 oz     Types: 3 - 4 Standard drinks or equivalent per week     Comment: quit    Drug use: Never    Sexual activity: Not on file   Other Topics Concern    Not on file   Social History Narrative    Not on file     Social Drivers of Health     Financial Resource Strain: Not on file   Food Insecurity: No Food Insecurity (3/28/2025)    Hunger Vital Sign     Worried About Running Out of Food in the Last Year: Never true     Ran Out of Food in the Last Year: Never true   Transportation Needs: No Transportation Needs (3/28/2025)    PRAPARE - Transportation     Lack of Transportation (Medical): No     Lack of Transportation  "(Non-Medical): No   Physical Activity: Not on file   Stress: Not on file   Social Connections: Not on file   Intimate Partner Violence: Not At Risk (3/28/2025)    Humiliation, Afraid, Rape, and Kick questionnaire     Fear of Current or Ex-Partner: No     Emotionally Abused: No     Physically Abused: No     Sexually Abused: No   Housing Stability: Low Risk  (3/28/2025)    Housing Stability Vital Sign     Unable to Pay for Housing in the Last Year: No     Number of Times Moved in the Last Year: 0     Homeless in the Last Year: No     Social History     Tobacco Use   Smoking Status Never   Smokeless Tobacco Current    Types: Chew   Tobacco Comments    Chews tobacco     Social History     Substance and Sexual Activity   Alcohol Use Yes    Alcohol/week: 1.8 - 2.4 oz    Types: 3 - 4 Standard drinks or equivalent per week    Comment: quit       Allergies/Intolerances:  Allergies   Allergen Reactions    Lisinopril Swelling     \"throat started to close up\"       History reviewed with the patient and /or family member, chart & primary care team    Other Current Medications:    Current Facility-Administered Medications:     vancomycin 1000 mg in 250 mL NS IVPB Premix, 1,000 mg, Intravenous, Q12HR, Cipriano Valdes M.D.    allopurinol (Zyloprim) tablet 300 mg, 300 mg, Oral, DAILY, Cipriano Valdes M.D., 300 mg at 03/29/25 0523    atorvastatin (Lipitor) tablet 40 mg, 40 mg, Oral, DAILY AT 1800, Cipriano Valdes M.D., 40 mg at 03/28/25 1933    dilTIAZem CD (Cardizem CD) capsule 300 mg, 300 mg, Oral, DAILY, Cipriano Valdes M.D., 300 mg at 03/29/25 0522    metoprolol SR (Toprol XL) tablet 200 mg, 200 mg, Oral, Q DAY, Cipriano Valdes M.D., 200 mg at 03/29/25 0523    omeprazole (PriLOSEC) capsule 20 mg, 20 mg, Oral, DAILY, Cipriano Valdes M.D., 20 mg at 03/29/25 0524    Pharmacy Consult Request ...Pain Management Review 1 Each, 1 Each, Other, PHARMACY TO DOSE, Cipriano Valdes M.D.    aspirin EC tablet 81 mg, 81 mg, Oral, BID, Cipriano Valdes, " "M.D., 81 mg at 25 0524    acetaminophen (Tylenol) tablet 1,000 mg, 1,000 mg, Oral, Q6HRS, 1,000 mg at 25 1237 **FOLLOWED BY** [START ON 2025] acetaminophen (Tylenol) tablet 1,000 mg, 1,000 mg, Oral, Q6HRS PRN, Cipriano Valdes M.D.    celecoxib (CeleBREX) capsule 200 mg, 200 mg, Oral, BID, 200 mg at 25 0523 **FOLLOWED BY** [START ON 2025] celecoxib (CeleBREX) capsule 200 mg, 200 mg, Oral, BID PRN, Cipriano Valdes M.D.    oxyCODONE immediate-release (Roxicodone) tablet 2.5 mg, 2.5 mg, Oral, Q3HRS PRN **OR** oxyCODONE immediate-release (Roxicodone) tablet 5 mg, 5 mg, Oral, Q3HRS PRN **OR** HYDROmorphone (Dilaudid) injection 0.25 mg, 0.25 mg, Intravenous, Q3HRS PRN, Cipriano Valdes M.D.    docusate sodium (Colace) capsule 100 mg, 100 mg, Oral, BID, Cipriano Valdes M.D., 100 mg at 25 0524    MD Alert...Vancomycin per Pharmacy, , Other, PHARMACY TO DOSE, Cipriano Valdes M.D.    [COMPLETED] piperacillin-tazobactam (Zosyn) 3.375 g in  mL IVPB, 3.375 g, Intravenous, Once, Stopped at 25 **AND** piperacillin-tazobactam (Zosyn) 3.375 g in  mL IVPB, 3.375 g, Intravenous, Q8HRS, Cipriano Valdes M.D., Last Rate: 25 mL/hr at 25 1239, 3.375 g at 25 1239  [unfilled]    Most Recent Vital Signs:  BP (!) 144/78 Comment: Rn aware  Pulse 71   Temp 36.9 °C (98.5 °F) (Oral)   Resp 17   Ht 1.803 m (5' 11\")   Wt 113 kg (249 lb 10.7 oz)   SpO2 93%   BMI 34.82 kg/m²   Temp  Av.3 °C (97.4 °F)  Min: 36 °C (96.8 °F)  Max: 36.9 °C (98.5 °F)    Physical Exam:  Physical Exam  Constitutional:       Appearance: Normal appearance.   HENT:      Head: Normocephalic and atraumatic.      Right Ear: External ear normal.      Left Ear: External ear normal.      Nose: Nose normal.      Mouth/Throat:      Mouth: Mucous membranes are moist.      Pharynx: Oropharynx is clear.   Eyes:      Extraocular Movements: Extraocular movements intact.      Conjunctiva/sclera: Conjunctivae " normal.      Pupils: Pupils are equal, round, and reactive to light.   Cardiovascular:      Rate and Rhythm: Normal rate and regular rhythm.      Heart sounds: Normal heart sounds.   Pulmonary:      Effort: Pulmonary effort is normal.      Breath sounds: Normal breath sounds.   Abdominal:      General: Abdomen is flat. Bowel sounds are normal.      Palpations: Abdomen is soft.      Tenderness: There is no abdominal tenderness.   Musculoskeletal:         General: Tenderness and signs of injury present.      Cervical back: Normal range of motion and neck supple.      Right lower leg: Edema present.   Skin:     General: Skin is warm and dry.   Neurological:      General: No focal deficit present.      Mental Status: He is alert and oriented to person, place, and time.   Psychiatric:         Mood and Affect: Mood normal.         Behavior: Behavior normal.           Pertinent Lab Results:  Recent Labs     03/28/25  1155 03/29/25  0245   WBC 10.4 11.5*      Recent Labs     03/28/25  1155 03/29/25  0245   HEMOGLOBIN 11.7* 10.6*   HEMATOCRIT 35.2* 32.9*   MCV 93.6 97.9*   MCH 31.1 31.5   PLATELETCT 231 256         Recent Labs     03/28/25  1155 03/29/25  0245   SODIUM 138 139   POTASSIUM 4.3 4.8   CHLORIDE 98 101   CO2 28 28   CREATININE 0.81 1.20        Recent Labs     03/28/25  1155   ALBUMIN 3.3        Pertinent Micro:  Results       Procedure Component Value Units Date/Time    MRSA By PCR (Amp) [[id number]] Collected: 03/28/25 1959    Order Status: Completed Specimen: Respirate from Nares Updated: 03/29/25 1204     MRSA by PCR Negative    GRAM STAIN [[id number]] Collected: 03/28/25 1518    Order Status: Completed Specimen: Tissue Updated: 03/29/25 0201     Significant Indicator .     Source TISS     Site RIGHT KNEE 2     Gram Stain Result Moderate WBCs.  No organisms seen.      Anaerobic Culture [[id number]] Collected: 03/28/25 1518    Order Status: No result Specimen: Tissue Updated: 03/29/25 0200     Significant  Indicator NEG     Source TISS     Site RIGHT KNEE 2     Culture Result -    CULTURE TISSUE W/ GRM STAIN [[id number]] Collected: 03/28/25 1518    Order Status: No result Specimen: Tissue Updated: 03/29/25 0200     Significant Indicator NEG     Source TISS     Site RIGHT KNEE 2     Culture Result -     Gram Stain Result Moderate WBCs.  No organisms seen.      GRAM STAIN [[id number]] Collected: 03/28/25 1518    Order Status: Completed Specimen: Tissue Updated: 03/29/25 0159     Significant Indicator .     Source TISS     Site RIGHT KNEE 1     Gram Stain Result Rare WBCs.  No organisms seen.      Narrative:      2- RIGHT KNEE 1    CULTURE TISSUE W/ GRM STAIN [[id number]] Collected: 03/28/25 1518    Order Status: No result Specimen: Tissue Updated: 03/29/25 0158     Significant Indicator NEG     Source TISS     Site RIGHT KNEE 1     Culture Result -     Gram Stain Result Rare WBCs.  No organisms seen.      Narrative:      2- RIGHT KNEE 1    Anaerobic Culture [[id number]] Collected: 03/28/25 1518    Order Status: No result Specimen: Tissue Updated: 03/29/25 0158     Significant Indicator NEG     Source TISS     Site RIGHT KNEE 1     Culture Result -    Narrative:      2- RIGHT KNEE 1    GRAM STAIN [[id number]] Collected: 03/28/25 1518    Order Status: Completed Specimen: Tissue Updated: 03/29/25 0158     Significant Indicator .     Source TISS     Site RIGHT KNEE 3     Gram Stain Result Few WBCs.  No organisms seen.      Narrative:      4- RIGHT KNEE 3    Anaerobic Culture [[id number]] Collected: 03/28/25 1518    Order Status: No result Specimen: Tissue Updated: 03/29/25 0158     Significant Indicator NEG     Source TISS     Site RIGHT KNEE 3     Culture Result -    Narrative:      4- RIGHT KNEE 3    CULTURE TISSUE W/ GRM STAIN [[id number]] Collected: 03/28/25 1518    Order Status: No result Specimen: Tissue Updated: 03/29/25 0158     Significant Indicator NEG     Source TISS     Site RIGHT KNEE 3     Culture Result -     Gram  "Stain Result Few WBCs.  No organisms seen.      Narrative:      4- RIGHT KNEE 3    Anaerobic Culture [[id number]] Collected: 03/28/25 1518    Order Status: No result Specimen: Synovial Updated: 03/28/25 2135    Narrative:      [facility] tel. [phone] 03/28/2025, 19:10, RB PERF. RESULTS CALLED TO: BRENDA Silvestre  [id number]  1 - synovial fluid    FLUID CULTURE W/GRAM STAIN [[id number]] Collected: 03/28/25 1518    Order Status: No result Specimen: Synovial Updated: 03/28/25 2135    Narrative:      [facility] tel. [phone] 03/28/2025, 19:10, RB PERF. RESULTS CALLED TO: BRENDA Silvestre  [id number]  1 - synovial fluid    GRAM STAIN [[id number]] Collected: 03/28/25 1518    Order Status: Completed Specimen: Synovial Updated: 03/28/25 1911     Significant Indicator .     Source SYNO     Site Right Knee     Gram Stain Result Testing performed at:  [facility]  [address]  [place] [address]  Many WBCs.  Rare Gram positive cocci.      Narrative:      1 - synovial fluid          No results found for: \"BLOODCULTU\", \"BLDCULT\", \"BCHOLD\"     Studies:  DX-KNEE 2- RIGHT  Result Date: 3/28/2025  3/28/2025 6:04 PM HISTORY/REASON FOR EXAM: . Revision TKA TECHNIQUE/EXAM DESCRIPTION AND NUMBER OF VIEWS:  2 views of the RIGHT knee. COMPARISON: None FINDINGS: The patient is status post revision total knee arthroplasty with patellar resurfacing. Hardware is intact. There is no definite fracture or dislocation. Soft tissue and intra-articular gas is consistent with recent surgery.  There is a chronic, healed fracture proximal fibula.     Revision total knee arthroplasty with no definite acute abnormality.    DX-KNEE COMPLETE 4+ RIGHT  Result Date: 3/24/2025  4 view x-rays of the right knee including AP, PA, sunrise and lateral interpreted by me today show no obvious signs of fracture or dislocation.  There is a cemented TKA in place.  Implants appear to be stable with satisfactory alignment.  Chronic proximal fibula fracture visualized.  Mild " to moderate effusion visualized on the sunrise view.      ASSESSMENT/PLAN:     64 y.o.  admitted 3/28/2025. Pt has a past medical history of total right knee arthroplasty in 2022.  He had been doing well until approxi-1 month ago when he began to have increasing pain and swelling secondary to an injury.  This progressed and he came into orthopedics for aspiration of the knee with reported WBCs of 96K, 8% polys.  He is admitted for orthopedic surgery.    Problem List    Right knee prosthetic joint infection  -Synovial fluid removed on 3/24 and consistent with infection, cultures positive for Streptococcus agalactiae  -Patient went to the OR on 3/28 for revision of right total knee arthroplasty both components removal of the prior hardware and placement of antibiotic spacer, per op note significant purulent material was encountered upon entering the joint as well as extensive synovitis   JAZMINE    Assessment:      -Notes were extensively reviewed from primary team, radiology, ED, surgery, specialists, etc.   -Reviewed labs to date, microbiology for current admit and prior  -Imaging was independently reviewed and interpreted    Plan:    --- Stop vancomycin and Zosyn and transition to cefazolin 2 g every 8 hours, anticipate a 6-week IV antibiotic course with continuous infusion Ancef, will place orders with  on Monday.  The patient does live in [place] which may be a barrier to discharge  --- Once the new knee is in place anticipate giving a 3-month oral suppression antibiotic course  --- Follow-up OR cultures  --- Monitor labs, ordered for a.m.    Dispo: Awaiting culture results and work-up as above.  Patient is at risk for infectious complications including death.    PICC: TBD      Plan of care discussed with PATIENCE Valdes M.D.. Will continue to follow    Kori Mariee M.D.

## 2025-03-29 NOTE — PROGRESS NOTES
Received bedside patient report from BRENDA Silvestre. Patient resting comfortably in bed, no complaints at this time. Safety precautions in place. Will continue to monitor.

## 2025-03-30 LAB
ANION GAP SERPL CALC-SCNC: 10 MMOL/L (ref 7–16)
BACTERIA TISS AEROBE CULT: ABNORMAL
BASOPHILS # BLD AUTO: 0.1 % (ref 0–1.8)
BASOPHILS # BLD: 0.01 K/UL (ref 0–0.12)
BUN SERPL-MCNC: 20 MG/DL (ref 8–22)
CALCIUM SERPL-MCNC: 7.9 MG/DL (ref 8.5–10.5)
CHLORIDE SERPL-SCNC: 103 MMOL/L (ref 96–112)
CO2 SERPL-SCNC: 28 MMOL/L (ref 20–33)
CREAT SERPL-MCNC: 0.95 MG/DL (ref 0.5–1.4)
EOSINOPHIL # BLD AUTO: 0 K/UL (ref 0–0.51)
EOSINOPHIL NFR BLD: 0 % (ref 0–6.9)
ERYTHROCYTE [DISTWIDTH] IN BLOOD BY AUTOMATED COUNT: 50.4 FL (ref 35.9–50)
GFR SERPLBLD CREATININE-BSD FMLA CKD-EPI: 90 ML/MIN/1.73 M 2
GLUCOSE SERPL-MCNC: 128 MG/DL (ref 65–99)
GRAM STN SPEC: ABNORMAL
HCT VFR BLD AUTO: 31.5 % (ref 42–52)
HGB BLD-MCNC: 10 G/DL (ref 14–18)
IMM GRANULOCYTES # BLD AUTO: 0.07 K/UL (ref 0–0.11)
IMM GRANULOCYTES NFR BLD AUTO: 0.5 % (ref 0–0.9)
LYMPHOCYTES # BLD AUTO: 0.68 K/UL (ref 1–4.8)
LYMPHOCYTES NFR BLD: 4.7 % (ref 22–41)
MCH RBC QN AUTO: 30.9 PG (ref 27–33)
MCHC RBC AUTO-ENTMCNC: 31.7 G/DL (ref 32.3–36.5)
MCV RBC AUTO: 97.2 FL (ref 81.4–97.8)
MONOCYTES # BLD AUTO: 0.76 K/UL (ref 0–0.85)
MONOCYTES NFR BLD AUTO: 5.3 % (ref 0–13.4)
NEUTROPHILS # BLD AUTO: 12.86 K/UL (ref 1.82–7.42)
NEUTROPHILS NFR BLD: 89.4 % (ref 44–72)
NRBC # BLD AUTO: 0 K/UL
NRBC BLD-RTO: 0 /100 WBC (ref 0–0.2)
PLATELET # BLD AUTO: 298 K/UL (ref 164–446)
PMV BLD AUTO: 9.8 FL (ref 9–12.9)
POTASSIUM SERPL-SCNC: 4.1 MMOL/L (ref 3.6–5.5)
RBC # BLD AUTO: 3.24 M/UL (ref 4.7–6.1)
SIGNIFICANT IND 70042: ABNORMAL
SITE SITE: ABNORMAL
SODIUM SERPL-SCNC: 141 MMOL/L (ref 135–145)
SOURCE SOURCE: ABNORMAL
WBC # BLD AUTO: 14.4 K/UL (ref 4.8–10.8)

## 2025-03-30 PROCEDURE — 700102 HCHG RX REV CODE 250 W/ 637 OVERRIDE(OP): Performed by: STUDENT IN AN ORGANIZED HEALTH CARE EDUCATION/TRAINING PROGRAM

## 2025-03-30 PROCEDURE — 80048 BASIC METABOLIC PNL TOTAL CA: CPT

## 2025-03-30 PROCEDURE — 97535 SELF CARE MNGMENT TRAINING: CPT

## 2025-03-30 PROCEDURE — A9270 NON-COVERED ITEM OR SERVICE: HCPCS | Performed by: STUDENT IN AN ORGANIZED HEALTH CARE EDUCATION/TRAINING PROGRAM

## 2025-03-30 PROCEDURE — 97163 PT EVAL HIGH COMPLEX 45 MIN: CPT

## 2025-03-30 PROCEDURE — 99024 POSTOP FOLLOW-UP VISIT: CPT | Performed by: STUDENT IN AN ORGANIZED HEALTH CARE EDUCATION/TRAINING PROGRAM

## 2025-03-30 PROCEDURE — 770001 HCHG ROOM/CARE - MED/SURG/GYN PRIV*

## 2025-03-30 PROCEDURE — 85025 COMPLETE CBC W/AUTO DIFF WBC: CPT

## 2025-03-30 PROCEDURE — 94760 N-INVAS EAR/PLS OXIMETRY 1: CPT

## 2025-03-30 PROCEDURE — A9270 NON-COVERED ITEM OR SERVICE: HCPCS | Performed by: HOSPITALIST

## 2025-03-30 PROCEDURE — 700111 HCHG RX REV CODE 636 W/ 250 OVERRIDE (IP): Mod: JZ | Performed by: STUDENT IN AN ORGANIZED HEALTH CARE EDUCATION/TRAINING PROGRAM

## 2025-03-30 PROCEDURE — 36415 COLL VENOUS BLD VENIPUNCTURE: CPT

## 2025-03-30 PROCEDURE — 700111 HCHG RX REV CODE 636 W/ 250 OVERRIDE (IP): Performed by: INTERNAL MEDICINE

## 2025-03-30 PROCEDURE — 700105 HCHG RX REV CODE 258: Performed by: INTERNAL MEDICINE

## 2025-03-30 PROCEDURE — 700102 HCHG RX REV CODE 250 W/ 637 OVERRIDE(OP): Performed by: HOSPITALIST

## 2025-03-30 RX ORDER — METOPROLOL SUCCINATE 100 MG/1
200 TABLET, EXTENDED RELEASE ORAL 2 TIMES DAILY
Status: DISCONTINUED | OUTPATIENT
Start: 2025-03-30 | End: 2025-04-02 | Stop reason: HOSPADM

## 2025-03-30 RX ORDER — CELECOXIB 200 MG/1
200 CAPSULE ORAL 2 TIMES DAILY
Status: DISCONTINUED | OUTPATIENT
Start: 2025-03-30 | End: 2025-04-02 | Stop reason: HOSPADM

## 2025-03-30 RX ORDER — CETIRIZINE HYDROCHLORIDE 10 MG/1
10 TABLET ORAL DAILY
Status: DISCONTINUED | OUTPATIENT
Start: 2025-03-30 | End: 2025-04-02 | Stop reason: HOSPADM

## 2025-03-30 RX ORDER — CALCIUM CARBONATE 500 MG/1
500 TABLET, CHEWABLE ORAL DAILY
Status: DISCONTINUED | OUTPATIENT
Start: 2025-03-30 | End: 2025-04-02 | Stop reason: HOSPADM

## 2025-03-30 RX ADMIN — OXYCODONE 5 MG: 5 TABLET ORAL at 17:18

## 2025-03-30 RX ADMIN — ASPIRIN 81 MG: 81 TABLET, COATED ORAL at 17:17

## 2025-03-30 RX ADMIN — CEFAZOLIN 2 G: 2 INJECTION, POWDER, FOR SOLUTION INTRAMUSCULAR; INTRAVENOUS at 21:24

## 2025-03-30 RX ADMIN — DOCUSATE SODIUM 100 MG: 100 CAPSULE, LIQUID FILLED ORAL at 06:27

## 2025-03-30 RX ADMIN — METOPROLOL SUCCINATE 200 MG: 100 TABLET, EXTENDED RELEASE ORAL at 06:26

## 2025-03-30 RX ADMIN — CETIRIZINE HYDROCHLORIDE 10 MG: 10 TABLET, FILM COATED ORAL at 17:49

## 2025-03-30 RX ADMIN — CELECOXIB 200 MG: 200 CAPSULE ORAL at 18:00

## 2025-03-30 RX ADMIN — CETIRIZINE HYDROCHLORIDE 10 MG: 10 TABLET, FILM COATED ORAL at 01:10

## 2025-03-30 RX ADMIN — ASPIRIN 81 MG: 81 TABLET, COATED ORAL at 06:26

## 2025-03-30 RX ADMIN — CELECOXIB 200 MG: 200 CAPSULE ORAL at 08:26

## 2025-03-30 RX ADMIN — ALLOPURINOL 300 MG: 300 TABLET ORAL at 06:26

## 2025-03-30 RX ADMIN — OXYCODONE 5 MG: 5 TABLET ORAL at 23:03

## 2025-03-30 RX ADMIN — OMEPRAZOLE 20 MG: 20 CAPSULE, DELAYED RELEASE ORAL at 06:26

## 2025-03-30 RX ADMIN — THERA TABS 1 TABLET: TAB at 08:26

## 2025-03-30 RX ADMIN — CEFAZOLIN 2 G: 2 INJECTION, POWDER, FOR SOLUTION INTRAMUSCULAR; INTRAVENOUS at 14:00

## 2025-03-30 RX ADMIN — METOPROLOL SUCCINATE 200 MG: 100 TABLET, EXTENDED RELEASE ORAL at 17:17

## 2025-03-30 RX ADMIN — OXYCODONE 5 MG: 5 TABLET ORAL at 06:31

## 2025-03-30 RX ADMIN — CEFAZOLIN 2 G: 2 INJECTION, POWDER, FOR SOLUTION INTRAMUSCULAR; INTRAVENOUS at 00:09

## 2025-03-30 RX ADMIN — DILTIAZEM HYDROCHLORIDE 300 MG: 300 CAPSULE, EXTENDED RELEASE ORAL at 06:29

## 2025-03-30 RX ADMIN — ACETAMINOPHEN 1000 MG: 500 TABLET ORAL at 06:26

## 2025-03-30 RX ADMIN — ATORVASTATIN CALCIUM 40 MG: 40 TABLET, FILM COATED ORAL at 17:18

## 2025-03-30 RX ADMIN — ACETAMINOPHEN 1000 MG: 500 TABLET ORAL at 23:03

## 2025-03-30 RX ADMIN — HYDROMORPHONE HYDROCHLORIDE 0.25 MG: 1 INJECTION, SOLUTION INTRAMUSCULAR; INTRAVENOUS; SUBCUTANEOUS at 19:34

## 2025-03-30 RX ADMIN — CEFAZOLIN 2 G: 2 INJECTION, POWDER, FOR SOLUTION INTRAMUSCULAR; INTRAVENOUS at 06:32

## 2025-03-30 RX ADMIN — ACETAMINOPHEN 1000 MG: 500 TABLET ORAL at 17:18

## 2025-03-30 RX ADMIN — DOCUSATE SODIUM 100 MG: 100 CAPSULE, LIQUID FILLED ORAL at 18:00

## 2025-03-30 RX ADMIN — ACETAMINOPHEN 1000 MG: 500 TABLET ORAL at 00:09

## 2025-03-30 RX ADMIN — ACETAMINOPHEN 1000 MG: 500 TABLET ORAL at 12:43

## 2025-03-30 ASSESSMENT — PAIN DESCRIPTION - PAIN TYPE
TYPE: ACUTE PAIN;SURGICAL PAIN
TYPE: SURGICAL PAIN
TYPE: ACUTE PAIN
TYPE: SURGICAL PAIN
TYPE: SURGICAL PAIN
TYPE: ACUTE PAIN;SURGICAL PAIN
TYPE: SURGICAL PAIN
TYPE: ACUTE PAIN;SURGICAL PAIN
TYPE: SURGICAL PAIN
TYPE: SURGICAL PAIN

## 2025-03-30 ASSESSMENT — GAIT ASSESSMENTS
GAIT LEVEL OF ASSIST: STANDBY ASSIST
DEVIATION: ANTALGIC;BRADYKINETIC
DISTANCE (FEET): 75

## 2025-03-30 ASSESSMENT — COGNITIVE AND FUNCTIONAL STATUS - GENERAL
MOVING TO AND FROM BED TO CHAIR: A LITTLE
SUGGESTED CMS G CODE MODIFIER MOBILITY: CK
WALKING IN HOSPITAL ROOM: A LITTLE
MOVING FROM LYING ON BACK TO SITTING ON SIDE OF FLAT BED: A LITTLE
TURNING FROM BACK TO SIDE WHILE IN FLAT BAD: A LITTLE
CLIMB 3 TO 5 STEPS WITH RAILING: A LOT
MOBILITY SCORE: 17
STANDING UP FROM CHAIR USING ARMS: A LITTLE

## 2025-03-30 NOTE — DISCHARGE PLANNING
Received DME FWW order. Choice form completed for [facility] per protocol, faxed to [facility].

## 2025-03-30 NOTE — PROGRESS NOTES
ID: Joshua Mckeon is a 64 y.o. male who underwent revision right TKA (explant and articulating abx spacer placement) for PJI (strep agalactiae) on 3/28/25    Subjective/Interval:  ID evaluated the patient and switched abx to cefazolin.  Pain is well-controlled on current regimen. Denies chest pain, shortness of breath, nausea, vomiting.  Urinating freely.  Tolerating orals.     Objective:  Vitals:    25 0600   BP: (!) 169/91   Pulse: 73   Resp: 18   Temp: 36.6 °C (97.8 °F)   SpO2: 92%      Gen: No acute distress.  Alert and oriented x 3.  Operative extremity:  Prevena intact, holding suction  Hinged knee brace in place, locked in extension  RLE: +DF/PF/EHL SILT s/s/sp/dp/t (at baseline)  No signs of DVT  Toes warm/well perfused    Labs:  WBC 14.4  H/H 10/31.5    Cx: multiple OR cultures now growing strep agalactiae    Assessment and plan:  Recovering as expected s/p above operation    #HTN: increasing metoprolol to patient's home dose, continuing diltiazem at home dose    Weight bearin% WB RLE in HKB locked in extension  PT/OT  Dressing: Maintain prevena  ID: cefazolin, appreciate ID recs  DVT: SCDs/ASA 81 BID  Dispo: Pending ID consult, PICC line, and setup for outpatient IV abx  Follow-up: Please follow up with Dr. Valdes's office 2 weeks after surgery for wound check. Call [phone] to confirm or make appointment    Cipriano Valdes M.D.

## 2025-03-30 NOTE — CARE PLAN
The patient is Stable - Low risk of patient condition declining or worsening    Shift Goals  Clinical Goals: pain control, free from falls  Patient Goals: rest and sleep    Progress made toward(s) clinical / shift goals:  pain controlled, no falls and patient had a shower this AM. Also had a BM last night    Patient is not progressing towards the following goals: n/a

## 2025-03-30 NOTE — DISCHARGE PLANNING
Received Choice Form at: 1114  Agency/Facility Name:  [facility] Referral per Choice Form at: 1121

## 2025-03-30 NOTE — CARE PLAN
The patient is Stable - Low risk of patient condition declining or worsening    Shift Goals  Clinical Goals: pain control  Patient Goals: Rest    Progress made toward(s) clinical / shift  Problem: Post-Operative Knee Replacement  Goal: Patient will demonstrate understanding of knee replacement post-surgical weight bearing restrictions and DME usage during hospital stay and post discharge  Outcome: Progressing     Problem: Post-Operative Knee Replacement  Goal: Patient's neurovascular status will be maintained or improve  Outcome: Progressing     Problem: Post-Operative Knee Replacement  Goal: Early mobilization post surgery  Outcome: Progressing     Problem: Post-Operative Knee Replacement  Goal: Proper fit of orthotic device will be assessed and maintained  Outcome: Progressing          Patient is not progressing towards the following goals:

## 2025-03-30 NOTE — THERAPY
Physical Therapy   Initial Evaluation     Patient Name: Joshua Mckeon  Age:  64 y.o., Sex:  male  Medical Record #: [id number]  Today's Date: 3/30/2025     Precautions  Precautions: Partial Weight Bearing Right Lower Extremity (See Comments for Percentage);Immobilizer Right Lower Extremity  Comments: 50% PWB, HKB locked in extension    Assessment  Patient is a 64 year old male who was seen s/p right TKA antibiotic spacer with PWB 50% orders for right lower extremity. Pt was agreeable to therapy evaluation and presented with safe upright functional mobility with AD use after therapy session. Pt was provided with education on elevation, icing, positioning, and supine/seated therapeutic exercises. Pt educated on therapeutic exercises that are isometric and have no knee ROM. Pt educated to communicate with MD office for when extension restrictions will be lifted and he may add in ROM exercises and to obtain an OP PT referral.  Pt was able to return demo safe gait mechanics with use of AD on flat level surfaces. Pt was able to demonstrate safe use of AD during transfers/transitions and general locomotion with no ivan LOB. Pt has no acute skilled PT needs at this time, anticipate pt to d/c home once medically clear with recs for FWW use and OP therapy services when appropriate.     The pt was provided with the following skilled therapy txt: Pt was provided with VC, demo, and facilitation during gait training. Pt was provided with VC and TC for appropriate isometric contraction for quad and glute sets, pt educated on ankle pumps. Pt was provided with education on intensity, frequency, and duration of exercises.    Plan    Physical Therapy Initial Treatment Plan   Duration: Evaluation only    DC Equipment Recommendations: Front-Wheel Walker  Discharge Recommendations: Recommend outpatient physical therapy services to address higher level deficits    Objective       03/30/25 2976   Initial Contact Note    Initial Contact  Note Order Received and Verified, Evaluation Only - Patient Does Not Require Further Acute Physical Therapy at this Time.  However, May Benefit from Post Acute Therapy for Higher Level Functional Deficits.   Precautions   Precautions Partial Weight Bearing Right Lower Extremity (See Comments for Percentage);Immobilizer Right Lower Extremity   Comments 50% PWB, HKB locked in extension   Vitals   O2 Delivery Device None - Room Air   Pain 0 - 10 Group   Therapist Pain Assessment During Activity;Nurse Notified;7   Prior Living Situation   Housing / Facility 2 Story House   Steps Into Home 0   Equipment Owned Crutches;Single Point Cane   Lives with - Patient's Self Care Capacity Spouse   Comments Pt lives in [place], His wife is physically able to assist as needed. Master bedroom is on the first floor and does not need to use upstairs at this time. Pt does not own a FWW, and needs one prior to DC. Pt is a    Prior Level of Functional Mobility   Bed Mobility Independent   Transfer Status Independent   Ambulation Independent   Ambulation Distance community   Assistive Devices Used None   Stairs Independent   Cognition    Cognition / Consciousness WDL   Level of Consciousness Alert   Passive ROM Lower Body   Passive ROM Lower Body X   Comments R LE locked in HKB in extension   Active ROM Lower Body    Active ROM Lower Body  X   Comments R LE in extension   Strength Lower Body   Lower Body Strength  X   Comments R LE limited due to recent TKA spacer surgery and locked in extension, L LE WFL   Coordination Lower Body    Coordination Lower Body  WDL   Balance Assessment   Sitting Balance (Static) Good   Sitting Balance (Dynamic) Fair +   Standing Balance (Static) Good   Standing Balance (Dynamic) Fair +   Weight Shift Sitting Good   Weight Shift Standing Fair   Comments FWW   Bed Mobility    Supine to Sit Standby Assist   Sit to Supine Standby Assist   Scooting Standby Assist   Gait Analysis   Gait Level Of Assist  Standby Assist   Distance (Feet) 75   # of Times Distance was Traveled 1   Deviation Antalgic;Bradykinetic  (R LE in extension, heavy reliance on FWW)   Weight Bearing Status PWB 50% R LE   Functional Mobility   Sit to Stand Supervised   Bed, Chair, Wheelchair Transfer Standby Assist   Toilet Transfers Standby Assist   Transfer Method Stand Step   Mobility bed mob, gait,   6 Clicks Assessment - How much HELP from from another person do you currently need... (If the patient hasn't done an activity recently, how much help from another person do you think he/she would need if he/she tried?)   Turning from your back to your side while in a flat bed without using bedrails? 3   Moving from lying on your back to sitting on the side of a flat bed without using bedrails? 3   Moving to and from a bed to a chair (including a wheelchair)? 3   Standing up from a chair using your arms (e.g., wheelchair, or bedside chair)? 3   Walking in hospital room? 3   Climbing 3-5 steps with a railing? 2   6 clicks Mobility Score 17   Patient / Family Goals    Patient / Family Goal #1 Pt would like to return to his work before october   Education Group   Education Provided Role of Physical Therapist;Gait Training;Stair Training;Use of Assistive Device;Exercises - Seated;Exercises - Supine   Role of Physical Therapist Patient Response Patient;Acceptance;Explanation;Demonstration;Verbal Demonstration;Action Demonstration   Gait Training Patient Response Patient;Acceptance;Explanation;Demonstration;Verbal Demonstration;Action Demonstration   Stair Training Patient Response Patient;Acceptance;Explanation;Demonstration;Verbal Demonstration;Action Demonstration;Handout   Use of Assistive Device Patient Response Patient;Acceptance;Explanation;Demonstration;Handout;Verbal Demonstration;Action Demonstration   Exercises - Supine Patient Response Patient;Acceptance;Handout;Demonstration;Explanation;Verbal Demonstration;Action Demonstration   Exercises -  Seated Patient Response Patient;Acceptance;Explanation;Demonstration;Handout;Verbal Demonstration;Action Demonstration   Physical Therapy Initial Treatment Plan    Duration Evaluation only   Anticipated Discharge Equipment and Recommendations   DC Equipment Recommendations Front-Wheel Walker   Discharge Recommendations Recommend outpatient physical therapy services to address higher level deficits   Interdisciplinary Plan of Care Collaboration   IDT Collaboration with  Nursing   Patient Position at End of Therapy In Bed;Call Light within Reach;Tray Table within Reach;Phone within Reach   Collaboration Comments PT eval   Session Information   Date / Session Number  3/30-eval only

## 2025-03-31 VITALS
DIASTOLIC BLOOD PRESSURE: 78 MMHG | SYSTOLIC BLOOD PRESSURE: 150 MMHG | HEART RATE: 68 BPM | OXYGEN SATURATION: 94 % | WEIGHT: 249.67 LBS | BODY MASS INDEX: 34.95 KG/M2 | HEIGHT: 71 IN | TEMPERATURE: 97.2 F | RESPIRATION RATE: 18 BRPM

## 2025-03-31 LAB
ANION GAP SERPL CALC-SCNC: 11 MMOL/L (ref 7–16)
BACTERIA FLD AEROBE CULT: ABNORMAL
BACTERIA FLD AEROBE CULT: ABNORMAL
BACTERIA SPEC ANAEROBE CULT: NORMAL
BUN SERPL-MCNC: 15 MG/DL (ref 8–22)
CALCIUM SERPL-MCNC: 8.6 MG/DL (ref 8.5–10.5)
CHLORIDE SERPL-SCNC: 105 MMOL/L (ref 96–112)
CO2 SERPL-SCNC: 28 MMOL/L (ref 20–33)
CREAT SERPL-MCNC: 0.84 MG/DL (ref 0.5–1.4)
ERYTHROCYTE [DISTWIDTH] IN BLOOD BY AUTOMATED COUNT: 52.1 FL (ref 35.9–50)
GFR SERPLBLD CREATININE-BSD FMLA CKD-EPI: 98 ML/MIN/1.73 M 2
GLUCOSE SERPL-MCNC: 91 MG/DL (ref 65–99)
GRAM STN SPEC: ABNORMAL
HCT VFR BLD AUTO: 33.6 % (ref 42–52)
HGB BLD-MCNC: 10.5 G/DL (ref 14–18)
MCH RBC QN AUTO: 31.2 PG (ref 27–33)
MCHC RBC AUTO-ENTMCNC: 31.3 G/DL (ref 32.3–36.5)
MCV RBC AUTO: 99.7 FL (ref 81.4–97.8)
PLATELET # BLD AUTO: 315 K/UL (ref 164–446)
PMV BLD AUTO: 9.5 FL (ref 9–12.9)
POTASSIUM SERPL-SCNC: 4.3 MMOL/L (ref 3.6–5.5)
RBC # BLD AUTO: 3.37 M/UL (ref 4.7–6.1)
SIGNIFICANT IND 70042: ABNORMAL
SIGNIFICANT IND 70042: NORMAL
SITE SITE: ABNORMAL
SITE SITE: NORMAL
SODIUM SERPL-SCNC: 144 MMOL/L (ref 135–145)
SOURCE SOURCE: ABNORMAL
SOURCE SOURCE: NORMAL
WBC # BLD AUTO: 11.3 K/UL (ref 4.8–10.8)

## 2025-03-31 PROCEDURE — 700105 HCHG RX REV CODE 258: Performed by: INTERNAL MEDICINE

## 2025-03-31 PROCEDURE — 700102 HCHG RX REV CODE 250 W/ 637 OVERRIDE(OP): Performed by: STUDENT IN AN ORGANIZED HEALTH CARE EDUCATION/TRAINING PROGRAM

## 2025-03-31 PROCEDURE — 770001 HCHG ROOM/CARE - MED/SURG/GYN PRIV*

## 2025-03-31 PROCEDURE — A9270 NON-COVERED ITEM OR SERVICE: HCPCS | Performed by: STUDENT IN AN ORGANIZED HEALTH CARE EDUCATION/TRAINING PROGRAM

## 2025-03-31 PROCEDURE — 94760 N-INVAS EAR/PLS OXIMETRY 1: CPT

## 2025-03-31 PROCEDURE — 700102 HCHG RX REV CODE 250 W/ 637 OVERRIDE(OP): Performed by: HOSPITALIST

## 2025-03-31 PROCEDURE — 99024 POSTOP FOLLOW-UP VISIT: CPT | Performed by: STUDENT IN AN ORGANIZED HEALTH CARE EDUCATION/TRAINING PROGRAM

## 2025-03-31 PROCEDURE — 700111 HCHG RX REV CODE 636 W/ 250 OVERRIDE (IP): Performed by: INTERNAL MEDICINE

## 2025-03-31 PROCEDURE — 99233 SBSQ HOSP IP/OBS HIGH 50: CPT | Performed by: INTERNAL MEDICINE

## 2025-03-31 PROCEDURE — 80048 BASIC METABOLIC PNL TOTAL CA: CPT

## 2025-03-31 PROCEDURE — 85027 COMPLETE CBC AUTOMATED: CPT

## 2025-03-31 PROCEDURE — A9270 NON-COVERED ITEM OR SERVICE: HCPCS | Performed by: HOSPITALIST

## 2025-03-31 RX ORDER — CELECOXIB 200 MG/1
200 CAPSULE ORAL DAILY
Qty: 60 CAPSULE | Refills: 1 | Status: SHIPPED | OUTPATIENT
Start: 2025-03-31 | End: 2025-04-02

## 2025-03-31 RX ORDER — PSEUDOEPHEDRINE HCL 30 MG
100 TABLET ORAL 2 TIMES DAILY
Qty: 60 CAPSULE | Refills: 1 | Status: SHIPPED | OUTPATIENT
Start: 2025-03-31 | End: 2025-04-02

## 2025-03-31 RX ORDER — ASPIRIN 81 MG/1
81 TABLET ORAL 2 TIMES DAILY
Qty: 60 TABLET | Refills: 0 | Status: SHIPPED | OUTPATIENT
Start: 2025-03-31 | End: 2025-04-02

## 2025-03-31 RX ADMIN — ACETAMINOPHEN 1000 MG: 500 TABLET ORAL at 05:51

## 2025-03-31 RX ADMIN — METOPROLOL SUCCINATE 200 MG: 100 TABLET, EXTENDED RELEASE ORAL at 17:16

## 2025-03-31 RX ADMIN — CEFAZOLIN 2 G: 2 INJECTION, POWDER, FOR SOLUTION INTRAMUSCULAR; INTRAVENOUS at 05:53

## 2025-03-31 RX ADMIN — THERA TABS 1 TABLET: TAB at 05:50

## 2025-03-31 RX ADMIN — DOCUSATE SODIUM 100 MG: 100 CAPSULE, LIQUID FILLED ORAL at 17:15

## 2025-03-31 RX ADMIN — OMEPRAZOLE 20 MG: 20 CAPSULE, DELAYED RELEASE ORAL at 05:50

## 2025-03-31 RX ADMIN — ASPIRIN 81 MG: 81 TABLET, COATED ORAL at 17:15

## 2025-03-31 RX ADMIN — METOPROLOL SUCCINATE 200 MG: 100 TABLET, EXTENDED RELEASE ORAL at 05:50

## 2025-03-31 RX ADMIN — ASPIRIN 81 MG: 81 TABLET, COATED ORAL at 05:50

## 2025-03-31 RX ADMIN — ATORVASTATIN CALCIUM 40 MG: 40 TABLET, FILM COATED ORAL at 17:16

## 2025-03-31 RX ADMIN — CETIRIZINE HYDROCHLORIDE 10 MG: 10 TABLET, FILM COATED ORAL at 05:50

## 2025-03-31 RX ADMIN — CELECOXIB 200 MG: 200 CAPSULE ORAL at 17:15

## 2025-03-31 RX ADMIN — OXYCODONE 5 MG: 5 TABLET ORAL at 19:53

## 2025-03-31 RX ADMIN — CEFAZOLIN 2 G: 2 INJECTION, POWDER, FOR SOLUTION INTRAMUSCULAR; INTRAVENOUS at 21:05

## 2025-03-31 RX ADMIN — CELECOXIB 200 MG: 200 CAPSULE ORAL at 05:50

## 2025-03-31 RX ADMIN — ALLOPURINOL 300 MG: 300 TABLET ORAL at 05:50

## 2025-03-31 RX ADMIN — DILTIAZEM HYDROCHLORIDE 300 MG: 300 CAPSULE, EXTENDED RELEASE ORAL at 05:50

## 2025-03-31 RX ADMIN — CEFAZOLIN 2 G: 2 INJECTION, POWDER, FOR SOLUTION INTRAMUSCULAR; INTRAVENOUS at 14:20

## 2025-03-31 RX ADMIN — ACETAMINOPHEN 1000 MG: 500 TABLET ORAL at 12:21

## 2025-03-31 RX ADMIN — ACETAMINOPHEN 1000 MG: 500 TABLET ORAL at 17:16

## 2025-03-31 RX ADMIN — OXYCODONE 5 MG: 5 TABLET ORAL at 12:21

## 2025-03-31 RX ADMIN — DOCUSATE SODIUM 100 MG: 100 CAPSULE, LIQUID FILLED ORAL at 05:50

## 2025-03-31 ASSESSMENT — ENCOUNTER SYMPTOMS
NAUSEA: 0
CONSTIPATION: 0
WEAKNESS: 0
VOMITING: 0
MYALGIAS: 1
DIARRHEA: 0
ABDOMINAL PAIN: 0
NERVOUS/ANXIOUS: 0

## 2025-03-31 ASSESSMENT — PAIN DESCRIPTION - PAIN TYPE
TYPE: ACUTE PAIN
TYPE: SURGICAL PAIN
TYPE: ACUTE PAIN;SURGICAL PAIN

## 2025-03-31 NOTE — DISCHARGE PLANNING
Case Management Discharge Planning    Admission Date: 3/28/2025  GMLOS: 1.5  ALOS: 3    6-Clicks ADL Score: 22  6-Clicks Mobility Score: 17    Anticipated Discharge Dispo: Discharge Disposition: Discharged to home/self care (01)    DME Needed: Yes    DME Ordered: Yes    Action(s) Taken: Updated Provider/Nurse on Discharge Plan    Received infusion orders from ID. Scanned and uploaded to chart. Per ID MD, pt is wanting home infusions.     1002-  Met with pt and pt's wife at bedside to discuss discharge plan. Pt confirmed they are wanting home infusions, provided choice. Pt gave choice for (1) [facility], and (2) [facility].   Discussed process for home infusions. Reported typically HH is ordered for PICC line care, discussed potential barrier of pt residing in [place] and his insurance.   Provided HH choice for [facility] that service [place]. Pt gave choice for (1) [facility], (2) [facility]. If HH is not available for pt, will need to look into other options for PICC line care.     Notified Charge RN of needing HH orders.     Choice forms faxed to [facility].     1326-  Received call from Dorcas with [facility]. Reviewed IV abx, labs needed, and PICC line care. [facility] will be following. Dorcas stated she will request insurance auth and will need to verify pt's PCP is willing to follow for HH, as well as verify staffing available.     1600-  Received voicemail from pt's wife, Caitlin inquiring about medical records for pt's  through his work. Call placed to Caitlin, notified her they will need to request his records through the records department. Provided information for [facility] medical records department.     Escalations Completed: None    Medically Clear: No    Next Steps: LSW to follow    Barriers to Discharge: Medical clearance and Outpatient Infusion required    Is the patient up for discharge tomorrow: No

## 2025-03-31 NOTE — PROGRESS NOTES
Received bedside report from NOC RN, assumed care of patient. Patient is AOX4, 90% on room air, states pain is 7/10 of the right knee at this time. Call light is within reach, bed locked in lowest position, hourly rounding in progress.    1057 - Reached out to Dr. Valdes for HH order  1059 - MD placed    1234 - Reached out to BRENDA Gomez about PICC line. Per Patricia will wait to place until definitive discharge date.

## 2025-03-31 NOTE — DISCHARGE PLANNING
Care Transition Team Assessment    LSW met with pt at bedside to complete discharge planning assessment.     Pt reported he lives in a two story house with his wife, Caitlin. Confirmed address on file.   Pt reported being independent prior to admission, pt had an accident at work on 2/28. Per pt's wife, pt has been using a cane and crutches since last week due to pain, and stated they went into [facility] to get it checked out and they sent them to .   Pt confirmed insurance on file, PCP is Kenyetta Christine.   Pt stated they often go to [facility] walk in clinic when they need things and can't get in with other providers.     Pt is needing home infusions, anticipate discharge home with HH.     Information Source  Orientation Level: Oriented X4  Information Given By: Patient  Who is responsible for making decisions for patient? : Patient    Readmission Evaluation  Is this a readmission?: No    Elopement Risk  Legal Hold: No  Ambulatory or Self Mobile in Wheelchair: Yes  Disoriented: No  Psychiatric Symptoms: None  History of Wandering: No  Elopement this Admit: No  Vocalizing Wanting to Leave: No  Displays Behaviors, Body Language Wanting to Leave: No-Not at Risk for Elopement  Elopement Risk: Not at Risk for Elopement    Interdisciplinary Discharge Planning  Lives with - Patient's Self Care Capacity: Spouse  Patient or legal guardian wants to designate a caregiver: No  Support Systems: Spouse / Significant Other  Housing / Facility: 2 Story House  Prior Services: None    Discharge Preparedness  What is your plan after discharge?: Home health care  What are your discharge supports?: Spouse  Prior Functional Level: Ambulatory, Independent with Activities of Daily Living, Uses Cane  Difficulity with ADLs: None  Difficulity with IADLs: None    Functional Assesment  Prior Functional Level: Ambulatory, Independent with Activities of Daily Living, Uses Cane    Finances  Financial Barriers to Discharge: No  Prescription  Coverage: Yes    Vision / Hearing Impairment  Vision Impairment : Yes  Right Eye Vision: Wears Glasses  Left Eye Vision: Wears Glasses  Hearing Impairment : No    Advance Directive  Advance Directive?: None    Domestic Abuse  Possible Abuse/Neglect Reported to:: Not Applicable    Psychological Assessment  History of Substance Abuse: None  History of Psychiatric Problems: No  Non-compliant with Treatment: No  Newly Diagnosed Illness: No    Discharge Risks or Barriers  Discharge risks or barriers?: No    Anticipated Discharge Information  Discharge Disposition: D/T to home under A care in anticipation of covered skilled care (06)  Discharge Address: [address] [place] [address] DR ELODIA LEE

## 2025-03-31 NOTE — DISCHARGE PLANNING
Received Choice Form at: 1009  Agency/Facility Name:  [facility]  Sent Referral per Choice Form at: 1205    Received Choice Form at: 1009  Agency/Facility Name:  [facility]   Sent Referral per Choice Form at: 1153

## 2025-03-31 NOTE — PROGRESS NOTES
Received bedside patient report from BRENDA Dickson. Patient resting comfortably in bed, no complaints at this time. Safety precautions in place. Will continue to monitor.

## 2025-03-31 NOTE — PROGRESS NOTES
ID: Joshua Mckeon is a 64 y.o. male who underwent revision right TKA (explant and articulating abx spacer placement) for PJI (strep agalactiae) on 3/28/25    Subjective/Interval:  No acute events.  Pain is well-controlled on current regimen. Denies chest pain, shortness of breath, nausea, vomiting.  Urinating freely.  Tolerating orals.     Objective:  Vitals:    25 0713   BP:    Pulse:    Resp:    Temp:    SpO2: 90%      Gen: No acute distress.  Alert and oriented x 3.  Operative extremity:  Prevena intact, holding suction  Hinged knee brace in place, locked in extension  RLE: +DF/PF/EHL, SILT s/s/sp/dp/t (at baseline)  No signs of DVT  Toes warm/well perfused    Labs:  Today's labs pending    Cx: OR cultures growing strep agalactiae    Assessment and plan:  Recovering as expected s/p above operation    #HTN: continue home dose antihypertensive    Weight bearin% WB RLE in HKB locked in extension  PT/OT  Dressing: Maintain prevena  ID: ID recs cefazolin for 6 weeks  DVT: SCDs/ASA 81 BID  Dispo: Pending PICC line and setup for outpatient IV abx  Follow-up: Please follow up with Dr. Valdes's office 2 weeks after surgery for wound check. Call [phone] to confirm or make appointment    Cipriano Valdes M.D.

## 2025-03-31 NOTE — DISCHARGE PLANNING
BOUCHRA received VM from Dorcas at [facility]. Returned her call to find out CM ELLIOTT answered her questions. Still pending and will let us know if they can accept.

## 2025-03-31 NOTE — CARE PLAN
The patient is Stable - Low risk of patient condition declining or worsening    Shift Goals  Clinical Goals: Patient will remain free of falls and pain management  Patient Goals: Patient will rest comfortably    Progress made toward(s) clinical / shift goals:  Patient will remain free from falls by calling for assistance and using FWW. Pain is controlled with scheduled tylenol and PRN oxycodone. Patient is awaiting PICC line prior to d/c    Patient is not progressing towards the following goals:

## 2025-03-31 NOTE — PROGRESS NOTES
Infectious Disease Progress Note    Author: Kori Mariee M.D. Date & Time of service: 3/31/2025  7:15 AM    Chief Complaint:  Right knee prosthetic joint infection     Interval History:      Review of Systems:  Review of Systems   Gastrointestinal:  Negative for abdominal pain, constipation, diarrhea, nausea and vomiting.   Musculoskeletal:  Positive for joint pain and myalgias.   Neurological:  Negative for weakness.   Psychiatric/Behavioral:  The patient is not nervous/anxious.        Hemodynamics:  Temp (24hrs), Av.6 °C (97.9 °F), Min:36.3 °C (97.4 °F), Max:37 °C (98.6 °F)  Temperature: 36.3 °C (97.4 °F)  Pulse  Av.5  Min: 59  Max: 101   Blood Pressure: (!) 169/84 (rn aware)       Physical Exam:  Physical Exam  Cardiovascular:      Rate and Rhythm: Normal rate and regular rhythm.      Heart sounds: Normal heart sounds.   Pulmonary:      Effort: Pulmonary effort is normal.      Breath sounds: Normal breath sounds.   Abdominal:      General: Abdomen is flat. Bowel sounds are normal.      Palpations: Abdomen is soft.   Musculoskeletal:         General: Tenderness and signs of injury present.      Right lower leg: Edema present.      Comments: Right knee with wound VAC in place   Skin:     General: Skin is warm and dry.   Neurological:      General: No focal deficit present.      Mental Status: He is oriented to person, place, and time.   Psychiatric:         Mood and Affect: Mood normal.         Behavior: Behavior normal.         Meds:    Current Facility-Administered Medications:     cetirizine    metoprolol SR    celecoxib    multivitamin    calcium carbonate    ceFAZolin    allopurinol    atorvastatin    dilTIAZem CD    omeprazole    Pharmacy Consult Request    aspirin    acetaminophen **FOLLOWED BY** [START ON 2025] acetaminophen    oxyCODONE immediate-release **OR** oxyCODONE immediate-release **OR** HYDROmorphone    docusate sodium    Labs:  Recent Labs     25  1155 25  0009  03/30/25 0248   WBC 10.4 11.5* 14.4*   RBC 3.76* 3.36* 3.24*   HEMOGLOBIN 11.7* 10.6* 10.0*   HEMATOCRIT 35.2* 32.9* 31.5*   MCV 93.6 97.9* 97.2   MCH 31.1 31.5 30.9   RDW 47.4 50.2* 50.4*   PLATELETCT 231 256 298   MPV 10.5 9.9 9.8   NEUTSPOLYS  --   --  89.40*   LYMPHOCYTES  --   --  4.70*   MONOCYTES  --   --  5.30   EOSINOPHILS  --   --  0.00   BASOPHILS  --   --  0.10     Recent Labs     03/28/25 1155 03/29/25 0245 03/30/25 0248   SODIUM 138 139 141   POTASSIUM 4.3 4.8 4.1   CHLORIDE 98 101 103   CO2 28 28 28   GLUCOSE 112* 213* 128*   BUN 9 16 20     Recent Labs     03/28/25 1155 03/29/25 0245 03/30/25 0248   ALBUMIN 3.3  --   --    TBILIRUBIN 0.8  --   --    ALKPHOSPHAT 111*  --   --    TOTPROTEIN 7.2  --   --    ALTSGPT 20  --   --    ASTSGOT 26  --   --    CREATININE 0.81 1.20 0.95       Imaging:  DX-KNEE 2- RIGHT  Result Date: 3/28/2025  3/28/2025 6:04 PM HISTORY/REASON FOR EXAM: . Revision TKA TECHNIQUE/EXAM DESCRIPTION AND NUMBER OF VIEWS:  2 views of the RIGHT knee. COMPARISON: None FINDINGS: The patient is status post revision total knee arthroplasty with patellar resurfacing. Hardware is intact. There is no definite fracture or dislocation. Soft tissue and intra-articular gas is consistent with recent surgery.  There is a chronic, healed fracture proximal fibula.     Revision total knee arthroplasty with no definite acute abnormality.    DX-KNEE COMPLETE 4+ RIGHT  Result Date: 3/24/2025  4 view x-rays of the right knee including AP, PA, sunrise and lateral interpreted by me today show no obvious signs of fracture or dislocation.  There is a cemented TKA in place.  Implants appear to be stable with satisfactory alignment.  Chronic proximal fibula fracture visualized.  Mild to moderate effusion visualized on the sunrise view.      Micro:  Results       Procedure Component Value Units Date/Time    Anaerobic Culture [[id number]] Collected: 03/28/25 1518    Order Status: Completed Specimen: Tissue  Updated: 03/30/25 1438     Significant Indicator NEG     Source TISS     Site RIGHT KNEE 3     Culture Result Culture in progress.    Narrative:      4- RIGHT KNEE 3    CULTURE TISSUE W/ GRM STAIN [[id number]]  (Abnormal) Collected: 03/28/25 1518    Order Status: Completed Specimen: Tissue Updated: 03/30/25 1438     Significant Indicator POS     Source TISS     Site RIGHT KNEE 3     Culture Result -     Gram Stain Result Few WBCs.  No organisms seen.       Culture Result Streptococcus agalactiae (Group B)  Moderate growth  See previous culture for sensitivity report.      Narrative:      4- RIGHT KNEE 3    Anaerobic Culture [[id number]] Collected: 03/28/25 1518    Order Status: Completed Specimen: Tissue Updated: 03/30/25 1437     Significant Indicator NEG     Source TISS     Site RIGHT KNEE 2     Culture Result Culture in progress.    CULTURE TISSUE W/ GRM STAIN [[id number]]  (Abnormal) Collected: 03/28/25 1518    Order Status: Completed Specimen: Tissue Updated: 03/30/25 1437     Significant Indicator POS     Source TISS     Site RIGHT KNEE 2     Culture Result -     Gram Stain Result Moderate WBCs.  No organisms seen.       Culture Result Streptococcus agalactiae (Group B)  Moderate growth  See previous culture for sensitivity report.      CULTURE TISSUE W/ GRM STAIN [[id number]]  (Abnormal) Collected: 03/28/25 1518    Order Status: Completed Specimen: Tissue Updated: 03/30/25 1437     Significant Indicator POS     Source TISS     Site RIGHT KNEE 1     Culture Result -     Gram Stain Result Rare WBCs.  No organisms seen.       Culture Result Streptococcus agalactiae (Group B)  Rare growth  See previous culture for sensitivity report.      Narrative:      2- RIGHT KNEE 1    Anaerobic Culture [[id number]] Collected: 03/28/25 1518    Order Status: Completed Specimen: Tissue Updated: 03/30/25 1437     Significant Indicator NEG     Source TISS     Site RIGHT KNEE 1     Culture Result Culture in progress.    Narrative:       2- RIGHT KNEE 1    Anaerobic Culture [[id number]] Collected: 03/28/25 1518    Order Status: Completed Specimen: Synovial Updated: 03/30/25 1436     Significant Indicator NEG     Source SYNO     Site Right Knee     Culture Result Culture in progress.    Narrative:      1 - synovial fluid    FLUID CULTURE W/GRAM STAIN [[id number]]  (Abnormal)  (Susceptibility) Collected: 03/28/25 1518    Order Status: Completed Specimen: Synovial Updated: 03/30/25 1436     Significant Indicator POS     Source SYNO     Site Right Knee     Culture Result -     Gram Stain Result Testing performed at:  [facility]  [address] [facility] [place] [address]  Many WBCs.  Rare Gram positive cocci.       Culture Result Streptococcus agalactiae (Group B)  >100,000 cfu/mL      Narrative:      1 - synovial fluid    Susceptibility       Streptococcus agalactiae (group b) (1)       Antibiotic Interpretation Microscan   Method Status    Daptomycin Sensitive <=0.5 mcg/mL JOHNIE Preliminary    Penicillin Sensitive <=0.03 mcg/mL JOHNIE Preliminary                       MRSA By PCR (Amp) [[id number]] Collected: 03/28/25 1959    Order Status: Completed Specimen: Respirate from Nares Updated: 03/29/25 1204     MRSA by PCR Negative    GRAM STAIN [[id number]] Collected: 03/28/25 1518    Order Status: Completed Specimen: Tissue Updated: 03/29/25 0201     Significant Indicator .     Source TISS     Site RIGHT KNEE 2     Gram Stain Result Moderate WBCs.  No organisms seen.      GRAM STAIN [[id number]] Collected: 03/28/25 1518    Order Status: Completed Specimen: Tissue Updated: 03/29/25 0159     Significant Indicator .     Source TISS     Site RIGHT KNEE 1     Gram Stain Result Rare WBCs.  No organisms seen.      Narrative:      2- RIGHT KNEE 1    GRAM STAIN [[id number]] Collected: 03/28/25 1518    Order Status: Completed Specimen: Tissue Updated: 03/29/25 0158     Significant Indicator .     Source TISS     Site RIGHT KNEE 3     Gram Stain Result Few  WBCs.  No organisms seen.      Narrative:      4- RIGHT KNEE 3    GRAM STAIN [[id number]] Collected: 03/28/25 1518    Order Status: Completed Specimen: Synovial Updated: 03/28/25 1911     Significant Indicator .     Source SYNO     Site Right Knee     Gram Stain Result Testing performed at:  [facility]  [address]  [place] [address]  Many WBCs.  Rare Gram positive cocci.      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  Studies reviewed  Micro reviewed  Notes from primary team and specialists reviewed    Pt with right leg swelling and mild tenderness.    Antibiotics as below.        ASSESSMENT/PLAN:      64 y.o.  admitted 3/28/2025. Pt has a past medical history of total right knee arthroplasty in 2022.  He had been doing well until approxi-1 month ago when he began to have increasing pain and swelling secondary to an injury.  This progressed and he came into orthopedics for aspiration of the knee with reported WBCs of 96K, 8% polys.  He is admitted for orthopedic surgery.     Problem List     Right knee prosthetic joint infection  -Synovial fluid removed on 3/24 and consistent with infection, cultures positive for Streptococcus agalactiae  -Patient went to the OR on 3/28 for revision of right total knee arthroplasty both components removal of the prior hardware and placement of antibiotic spacer, per op note significant purulent material was encountered upon entering the joint as well as extensive synovitis   - OR cx + Streptococcus agalactiae  JAZMINE, improved        Plan:     --- Continue cefazolin 2 g every 8 hours, recommend 6-week IV antibiotic course with continuous infusion cefazolin, end 5/9/25   --- While patient is on IV antibiotics he will need at least  weekly CBC and CMP with results provided to [facility]  --- Orders for continuous home infusion written and given to case manage. Patient does live in [place], if unable to obtain insurance approval for home infusion will need to reach out to nearest facility to see if he can go in for once daily antibiotic management.  --- Once the new knee is in place anticipate giving a 3-month oral suppression antibiotic course and could use Augmentin or Keflex    Follow-up in ID clinic -[facility] so potentially can see us at the same time.  Otherwise could use telemedicine given he lives in [place].     Dispo: Okay for discharge from ID perspective once we have arrangements made for antibiotics.  Patient is at risk for infectious complications including death.     PICC: Ordered        Plan of care discussed with PATIENCE Valdes M.D..  ID will sign off.  Please reconsult if any new questions or concerns or if additional orders are needed.        Kori Mariee M.D.

## 2025-04-01 ENCOUNTER — APPOINTMENT (OUTPATIENT)
Dept: RADIOLOGY | Facility: MEDICAL CENTER | Age: 65
DRG: 467 | End: 2025-04-01
Attending: INTERNAL MEDICINE
Payer: COMMERCIAL

## 2025-04-01 PROCEDURE — 700102 HCHG RX REV CODE 250 W/ 637 OVERRIDE(OP): Performed by: HOSPITALIST

## 2025-04-01 PROCEDURE — A9270 NON-COVERED ITEM OR SERVICE: HCPCS | Performed by: HOSPITALIST

## 2025-04-01 PROCEDURE — 700102 HCHG RX REV CODE 250 W/ 637 OVERRIDE(OP): Performed by: STUDENT IN AN ORGANIZED HEALTH CARE EDUCATION/TRAINING PROGRAM

## 2025-04-01 PROCEDURE — 700111 HCHG RX REV CODE 636 W/ 250 OVERRIDE (IP): Performed by: INTERNAL MEDICINE

## 2025-04-01 PROCEDURE — 36573 INSJ PICC RS&I 5 YR+: CPT

## 2025-04-01 PROCEDURE — 02HV33Z INSERTION OF INFUSION DEVICE INTO SUPERIOR VENA CAVA, PERCUTANEOUS APPROACH: ICD-10-PCS | Performed by: STUDENT IN AN ORGANIZED HEALTH CARE EDUCATION/TRAINING PROGRAM

## 2025-04-01 PROCEDURE — A9270 NON-COVERED ITEM OR SERVICE: HCPCS | Performed by: STUDENT IN AN ORGANIZED HEALTH CARE EDUCATION/TRAINING PROGRAM

## 2025-04-01 PROCEDURE — 770001 HCHG ROOM/CARE - MED/SURG/GYN PRIV*

## 2025-04-01 PROCEDURE — 94760 N-INVAS EAR/PLS OXIMETRY 1: CPT

## 2025-04-01 PROCEDURE — 700105 HCHG RX REV CODE 258: Performed by: INTERNAL MEDICINE

## 2025-04-01 PROCEDURE — 99024 POSTOP FOLLOW-UP VISIT: CPT | Performed by: STUDENT IN AN ORGANIZED HEALTH CARE EDUCATION/TRAINING PROGRAM

## 2025-04-01 RX ADMIN — ACETAMINOPHEN 1000 MG: 500 TABLET ORAL at 00:07

## 2025-04-01 RX ADMIN — THERA TABS 1 TABLET: TAB at 05:12

## 2025-04-01 RX ADMIN — DOCUSATE SODIUM 100 MG: 100 CAPSULE, LIQUID FILLED ORAL at 17:10

## 2025-04-01 RX ADMIN — OXYCODONE 5 MG: 5 TABLET ORAL at 12:07

## 2025-04-01 RX ADMIN — ASPIRIN 81 MG: 81 TABLET, COATED ORAL at 17:10

## 2025-04-01 RX ADMIN — ALLOPURINOL 300 MG: 300 TABLET ORAL at 05:13

## 2025-04-01 RX ADMIN — ACETAMINOPHEN 1000 MG: 500 TABLET ORAL at 17:10

## 2025-04-01 RX ADMIN — OXYCODONE 5 MG: 5 TABLET ORAL at 08:57

## 2025-04-01 RX ADMIN — CETIRIZINE HYDROCHLORIDE 10 MG: 10 TABLET, FILM COATED ORAL at 05:13

## 2025-04-01 RX ADMIN — ACETAMINOPHEN 1000 MG: 500 TABLET ORAL at 05:14

## 2025-04-01 RX ADMIN — DOCUSATE SODIUM 100 MG: 100 CAPSULE, LIQUID FILLED ORAL at 05:12

## 2025-04-01 RX ADMIN — METOPROLOL SUCCINATE 200 MG: 100 TABLET, EXTENDED RELEASE ORAL at 05:12

## 2025-04-01 RX ADMIN — CEFAZOLIN 2 G: 2 INJECTION, POWDER, FOR SOLUTION INTRAMUSCULAR; INTRAVENOUS at 05:15

## 2025-04-01 RX ADMIN — OXYCODONE 5 MG: 5 TABLET ORAL at 03:24

## 2025-04-01 RX ADMIN — CEFAZOLIN 2 G: 2 INJECTION, POWDER, FOR SOLUTION INTRAMUSCULAR; INTRAVENOUS at 13:53

## 2025-04-01 RX ADMIN — OXYCODONE 5 MG: 5 TABLET ORAL at 17:14

## 2025-04-01 RX ADMIN — OMEPRAZOLE 20 MG: 20 CAPSULE, DELAYED RELEASE ORAL at 05:13

## 2025-04-01 RX ADMIN — OXYCODONE 5 MG: 5 TABLET ORAL at 23:04

## 2025-04-01 RX ADMIN — DILTIAZEM HYDROCHLORIDE 300 MG: 300 CAPSULE, EXTENDED RELEASE ORAL at 05:12

## 2025-04-01 RX ADMIN — ATORVASTATIN CALCIUM 40 MG: 40 TABLET, FILM COATED ORAL at 17:10

## 2025-04-01 RX ADMIN — ACETAMINOPHEN 1000 MG: 500 TABLET ORAL at 12:07

## 2025-04-01 RX ADMIN — CELECOXIB 200 MG: 200 CAPSULE ORAL at 05:12

## 2025-04-01 RX ADMIN — ASPIRIN 81 MG: 81 TABLET, COATED ORAL at 05:12

## 2025-04-01 RX ADMIN — CEFAZOLIN 2 G: 2 INJECTION, POWDER, FOR SOLUTION INTRAMUSCULAR; INTRAVENOUS at 21:12

## 2025-04-01 RX ADMIN — CELECOXIB 200 MG: 200 CAPSULE ORAL at 17:10

## 2025-04-01 RX ADMIN — METOPROLOL SUCCINATE 200 MG: 100 TABLET, EXTENDED RELEASE ORAL at 17:10

## 2025-04-01 ASSESSMENT — PAIN DESCRIPTION - PAIN TYPE
TYPE: SURGICAL PAIN

## 2025-04-01 ASSESSMENT — PAIN SCALES - GENERAL: PAIN_LEVEL: 6

## 2025-04-01 NOTE — CARE PLAN
The patient is Stable - Low risk of patient condition declining or worsening    Shift Goals  Clinical Goals: Remain free from falls, PICC line insertion, Pain controlled to tolerable,  Patient Goals: PICC, go home    Progress made toward(s) clinical / shift goals:  Remains free from falls by calling for assistance and using FWW. PICC inserted today. Pain controlled per MAR    Patient is not progressing towards the following goals:

## 2025-04-01 NOTE — PROGRESS NOTES
ID: Joshua Mckeon is a 64 y.o. male who underwent revision right TKA (explant and articulating abx spacer placement) for PJI (strep agalactiae) on 3/28/25    Subjective/Interval:  No acute events.  Pain is well-controlled on current regimen. Denies chest pain, shortness of breath, nausea, vomiting.  Urinating freely.  Tolerating orals.     Objective:  Vitals:    25 0500   BP: (!) 172/93   Pulse: 77   Resp: 18   Temp: 36.2 °C (97.1 °F)   SpO2: 91%     Gen: No acute distress.  Alert and oriented x 3.  Operative extremity:  Prevena intact, holding suction  Hinged knee brace in place, locked in extension  RLE: +DF/PF/EHL, SILT s/s/sp/dp/t (at baseline)  No signs of DVT  Toes warm/well perfused    Labs:  None new    Assessment and plan:  Recovering as expected s/p above operation    #HTN: continue home dose antihypertensive, may add prns if continues >160 SBP    Weight bearin% WB RLE in HKB locked in extension  PT/OT  Dressing: Maintain prevena  ID: ID recs cefazolin for 6 weeks, PICC pending  DVT: SCDs/ASA 81 BID  Dispo: Pending PICC line and setup for outpatient IV abx  Follow-up: Scheduled. Call [phone] to confirm or change.    Cipriano Valdes M.D.

## 2025-04-01 NOTE — PROCEDURES
Vascular Access Team     Date of Insertion: 4/1/2025  Arm Circumference: 35  Internal length: 46  External Length: 0  Vein Occupancy %: 27   Reason for PICC: IV ABX  Labs: WBC 11.3, , BUN 15, Cr 0.84, GFR 98     Consents confirmed, vessel patency confirmed with ultrasound. Risks and benefits of procedure explained to patient and significant other and education regarding central line associated bloodstream infections provided. Questions answered.      PICC placed in LUE per licensed provider order with ultrasound guidance.  4 Fr, 1 lumen PICC placed in CEPHALIC vein after 1 attempt(s). 2 mL of 1% lidocaine injected intradermally at the insertion site. A 21 gauge microintroducer needle was visualized entering the vein and modified Seldinger technique was used to obtain access to the vein. 46 cm catheter inserted and brisk blood return was observed from each lumen upon aspiration. Line secured at the 0 cm marker. TCS stylet removed and observed to be fully intact. Each lumen flushed using pulsatile method without resistance with 10 mL 0.9% normal saline. PICC line secured with Biopatch and Tegaderm.     PICC tip placement location is confirmed by nurse to be in the Superior Vena Cava (SVC) utilizing 3CG technology. PICC line is appropriate for use at this time. Patient tolerated procedure well, without complications.  Patient condition relayed to primary RN or ordering physician via this post procedure note in the EMR.      Ultrasound images uploaded to PACS and viewable in the EMR - yes  Ultrasound imaged printed and placed in paper chart - no     BARD Power PICC ref # [id number], Lot # [id number], Expiration Date 2026-01-03

## 2025-04-01 NOTE — DISCHARGE PLANNING
Case Management Discharge Planning    Admission Date: 3/28/2025  GMLOS: 3  ALOS: 4    6-Clicks ADL Score: 22  6-Clicks Mobility Score: 17    Anticipated Discharge Dispo: Discharge Disposition: D/T to home under [facility] care in anticipation of covered skilled care (06)  Discharge Address: [address] [place] [address] [address] [place]    DME Needed: No    Action(s) Taken: Updated Provider/Nurse on Discharge Plan    Received voicemail from Eri with [facility] [employer]. Pt's insurance is contracted with [facility] and pt is covered at 90% until he meets his deductible and then will be covered at 100%. Eri will be able to meet with pt to do teaching later today.     PICC line has been placed.     Pending HH acceptance.   Call placed to [facility], spoke with Dorcas. Dorcas reported they are still waiting to hear back from his insurance and his PCP to verify they are willing to follow.   Dorcas stated she will follow up with his PCP as that will determine if they are able to accept.     1608-  Received call from Dorcas with [facility]. Dorcas reported pt's PCP has agreed to follow, however pt will need to schedule an appointment with them. Dorcas reported all that is still pending is insurance auth.   LSW to follow up tomorrow.     Escalations Completed: None    Medically Clear: Yes    Next Steps: LSW to follow    Barriers to Discharge: Outpatient Infusion required and Outpatient referrals pending    Is the patient up for discharge tomorrow: No

## 2025-04-01 NOTE — DISCHARGE SUMMARY
Discharge Summary    CHIEF COMPLAINT ON ADMISSION  Right knee pain    Reason for Admission  Infection of total right knee replacement    Admission Date  3/28/2025    CODE STATUS  Full Code    HPI & HOSPITAL COURSE  This is a 64 y.o. male here with a right TKA PJI (strep agalactiae) who underwent revision right TKA (explant and articulating abx spacer placement) on 3/28/25. He was admitted postoperatively for pain control, PT, and IV antibiotic treatment.  Infectious diseases was consulted and left recommendations for 6 weeks of IV cefazolin treatment.  A PICC was placed and home antibiotics were arranged prior to discharge. He was stable during admission.     Therefore, he is discharged in fair and stable condition to home with organized home healthcare and close outpatient follow-up.    The patient met 2-midnight criteria for an inpatient stay at the time of discharge.    Discharge Date  4/2/2025    FOLLOW UP ITEMS POST DISCHARGE  Follow up with Dr. 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 *      FOLLOW UP  Future Appointments   Date Time Provider Department Center   4/8/2025  1:45 PM Cipriano Valdes M.D. [address] [address]  [phone]        Cipriano Valdes M.D.  [address] [facility] [id number] [phone]    Follow up        MEDICATIONS ON DISCHARGE     Medication List        START taking these medications        Instructions   aspirin 81 MG EC tablet   Take 1 Tablet by mouth 2 times a day.  Dose: 81 mg     docusate sodium 100 MG Caps   Take 1 capsule by mouth 2 times a day.  Dose: 100 mg     hydrocodone/APAP 5/325 mg 5-325 Tabs  Commonly known as: Generic For Norco   Take 1 Tablet by mouth every four hours as  "needed (pain) for up to 7 days.  Dose: 1 Tablet     NS SOLN 100 mL with ceFAZolin 2 g SOLR 2 g   Infuse 2 g into a venous catheter every 8 hours.  Dose: 2 g            CONTINUE taking these medications        Instructions   allopurinol 300 MG Tabs  Commonly known as: Zyloprim   Take 300 mg by mouth every day.  Dose: 300 mg     atorvastatin 40 MG Tabs  Commonly known as: Lipitor   Take 1 Tablet by mouth every day.  Dose: 1 Tablet     celecoxib 200 MG Caps  Commonly known as: CeleBREX   Take 1 Capsule by mouth every day.  Dose: 200 mg     COQ10 PO   Take  by mouth.     dilTIAZem  MG Cp24  Commonly known as: Cardizem CD   Take  by mouth every day.     ECHINACEA PO   Take  by mouth.     metoprolol 200 MG XL tablet  Commonly known as: Toprol XL   Take 1 Tablet by mouth 2 times a day.  Dose: 1 Tablet     multivitamin Tabs   Take 1 Tablet by mouth every day.  Dose: 1 Tablet     NON SPECIFIED   Tart Cherry Extract, Swell no More     omeprazole 20 MG delayed-release capsule  Commonly known as: PriLOSEC   Take 20 mg by mouth every day.  Dose: 20 mg     tadalafil 5 MG tablet  Commonly known as: Cialis   Take 5 mg by mouth as needed for Erectile Dysfunction.  Dose: 5 mg     tamsulosin 0.4 MG capsule  Commonly known as: Flomax   Take 0.4 mg by mouth 2 times a day.  Dose: 0.4 mg     TYLENOL PO   Take  by mouth.     VITAMIN B-12 PO   Take  by mouth.     VITAMIN D-3 PO   Take  by mouth.              Allergies  Allergies   Allergen Reactions    Lisinopril Swelling     \"throat started to close up\"       DIET  Orders Placed This Encounter   Procedures    Diet Order Diet: Regular     Standing Status:   Standing     Number of Occurrences:   1     Diet::   Regular [1]       ACTIVITY  As tolerated and directed by rehab.  50% WB RLE with hinged braced locked in extension    CONSULTATIONS  Infectious diseases    PROCEDURES  Revision right total knee arthroplasty with placement of articulating antibiotic spacer.     LABORATORY  Lab " Results   Component Value Date    SODIUM 144 03/31/2025    POTASSIUM 4.3 03/31/2025    CHLORIDE 105 03/31/2025    CO2 28 03/31/2025    GLUCOSE 91 03/31/2025    BUN 15 03/31/2025    CREATININE 0.84 03/31/2025        Lab Results   Component Value Date    WBC 11.3 (H) 03/31/2025    HEMOGLOBIN 10.5 (L) 03/31/2025    HEMATOCRIT 33.6 (L) 03/31/2025    PLATELETCT 315 03/31/2025

## 2025-04-01 NOTE — PROGRESS NOTES
Received bedside report from NOC RN, assumed care of patient. Patient is AXO4, 92% on room air, states pain is 7/10 at this time. Call light is within reach, bed locked in lowest position, hourly rounding in progress.

## 2025-04-01 NOTE — PROGRESS NOTES
"  PICC Line Instructions    How to Care for your PICC  Do not take a bath, swim, or use hot tubs when you have a PICC. Cover PICC line with clear plastic wrap and tape to keep it dry while showering.  Check the PICC insertion site daily for leakage, redness, swelling, or pain.  Flush the PICC as directed by your health care provider. Let your health care provider know right away if the PICC is difficult to flush or does not flush. Do not use force to flush the PICC.  Do not use a syringe that is less than 10 mL to flush the PICC.  Avoid blood pressure checks on the arm with the PICC.  Do not take the PICC out yourself. Only a trained clinical professional should remove the PICC.  Make sure you or anyone who access your PICC Line washes their hands before using the line.  Make sure the hub of the line is \"scrubbed\" prior to using the line.    Dressing Changes  Change the PICC dressing as instructed by your health care provider.  Change your PICC dressing if it becomes loose or wet.    When to seek medical attention  PICC is accidentally pulled all the way out.  There is any type of drainage, redness, or swelling where the PICC enters the skin.  Noticeable increase in arm circumference due to swelling of arm.  You cannot flush the PICC, it is difficult to flush, or the PICC leaks around the insertion site when it is flushed.  You hear a \"flushing\" sound when the PICC is flushed.  You notice a hole or tear in the PICC.  You develop chills or a fever.  "

## 2025-04-01 NOTE — ANESTHESIA POSTPROCEDURE EVALUATION
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 Responsible Provider: Adriano Chan M.D.    Anesthesia Type: general, peripheral nerve block ASA Status: 2            Final Anesthesia Type: general, peripheral nerve block  Last vitals  BP   Blood Pressure: (!) 172/93 (rn notified)    Temp   36.2 °C (97.1 °F)    Pulse   77   Resp   18    SpO2   91 %      Anesthesia Post Evaluation    Patient location during evaluation: PACU  Patient participation: complete - patient participated  Level of consciousness: awake and alert  Pain score: 6    Airway patency: patent  Anesthetic complications: no  Cardiovascular status: hemodynamically stable  Respiratory status: acceptable  Hydration status: euvolemic    PONV: none          No notable events documented.     Nurse Pain Score: 8 (NPRS)

## 2025-04-02 VITALS
HEART RATE: 66 BPM | RESPIRATION RATE: 17 BRPM | DIASTOLIC BLOOD PRESSURE: 83 MMHG | BODY MASS INDEX: 34.95 KG/M2 | SYSTOLIC BLOOD PRESSURE: 169 MMHG | OXYGEN SATURATION: 95 % | TEMPERATURE: 97.4 F | WEIGHT: 249.67 LBS | HEIGHT: 71 IN

## 2025-04-02 LAB
BACTERIA SPEC ANAEROBE CULT: NORMAL
SIGNIFICANT IND 70042: NORMAL
SITE SITE: NORMAL
SOURCE SOURCE: NORMAL

## 2025-04-02 PROCEDURE — 99024 POSTOP FOLLOW-UP VISIT: CPT | Performed by: STUDENT IN AN ORGANIZED HEALTH CARE EDUCATION/TRAINING PROGRAM

## 2025-04-02 PROCEDURE — 700102 HCHG RX REV CODE 250 W/ 637 OVERRIDE(OP): Performed by: HOSPITALIST

## 2025-04-02 PROCEDURE — A9270 NON-COVERED ITEM OR SERVICE: HCPCS | Performed by: HOSPITALIST

## 2025-04-02 PROCEDURE — 700105 HCHG RX REV CODE 258: Performed by: INTERNAL MEDICINE

## 2025-04-02 PROCEDURE — A9270 NON-COVERED ITEM OR SERVICE: HCPCS | Performed by: STUDENT IN AN ORGANIZED HEALTH CARE EDUCATION/TRAINING PROGRAM

## 2025-04-02 PROCEDURE — 700102 HCHG RX REV CODE 250 W/ 637 OVERRIDE(OP): Performed by: STUDENT IN AN ORGANIZED HEALTH CARE EDUCATION/TRAINING PROGRAM

## 2025-04-02 PROCEDURE — 700111 HCHG RX REV CODE 636 W/ 250 OVERRIDE (IP): Performed by: INTERNAL MEDICINE

## 2025-04-02 RX ORDER — CELECOXIB 200 MG/1
200 CAPSULE ORAL DAILY
Qty: 60 CAPSULE | Refills: 1 | Status: SHIPPED | OUTPATIENT
Start: 2025-04-02

## 2025-04-02 RX ORDER — PSEUDOEPHEDRINE HCL 30 MG
100 TABLET ORAL 2 TIMES DAILY
Qty: 60 CAPSULE | Refills: 1 | Status: SHIPPED | OUTPATIENT
Start: 2025-04-02

## 2025-04-02 RX ORDER — ASPIRIN 81 MG/1
81 TABLET ORAL 2 TIMES DAILY
Qty: 60 TABLET | Refills: 0 | Status: SHIPPED | OUTPATIENT
Start: 2025-04-02

## 2025-04-02 RX ADMIN — THERA TABS 1 TABLET: TAB at 05:28

## 2025-04-02 RX ADMIN — ALLOPURINOL 300 MG: 300 TABLET ORAL at 05:28

## 2025-04-02 RX ADMIN — ACETAMINOPHEN 1000 MG: 500 TABLET ORAL at 12:29

## 2025-04-02 RX ADMIN — ACETAMINOPHEN 1000 MG: 500 TABLET ORAL at 00:01

## 2025-04-02 RX ADMIN — DOCUSATE SODIUM 100 MG: 100 CAPSULE, LIQUID FILLED ORAL at 05:28

## 2025-04-02 RX ADMIN — METOPROLOL SUCCINATE 200 MG: 100 TABLET, EXTENDED RELEASE ORAL at 05:28

## 2025-04-02 RX ADMIN — OXYCODONE 5 MG: 5 TABLET ORAL at 07:52

## 2025-04-02 RX ADMIN — OXYCODONE 5 MG: 5 TABLET ORAL at 12:54

## 2025-04-02 RX ADMIN — ACETAMINOPHEN 1000 MG: 500 TABLET ORAL at 05:28

## 2025-04-02 RX ADMIN — DILTIAZEM HYDROCHLORIDE 300 MG: 300 CAPSULE, EXTENDED RELEASE ORAL at 05:28

## 2025-04-02 RX ADMIN — CELECOXIB 200 MG: 200 CAPSULE ORAL at 05:28

## 2025-04-02 RX ADMIN — CETIRIZINE HYDROCHLORIDE 10 MG: 10 TABLET, FILM COATED ORAL at 05:28

## 2025-04-02 RX ADMIN — OMEPRAZOLE 20 MG: 20 CAPSULE, DELAYED RELEASE ORAL at 05:29

## 2025-04-02 RX ADMIN — ASPIRIN 81 MG: 81 TABLET, COATED ORAL at 05:28

## 2025-04-02 RX ADMIN — CEFAZOLIN 2 G: 2 INJECTION, POWDER, FOR SOLUTION INTRAMUSCULAR; INTRAVENOUS at 05:31

## 2025-04-02 ASSESSMENT — PAIN DESCRIPTION - PAIN TYPE
TYPE: SURGICAL PAIN

## 2025-04-02 NOTE — PROGRESS NOTES
ID: Joshua Mckeon is a 64 y.o. male who underwent revision right TKA (explant and articulating abx spacer placement) for PJI (strep agalactiae) on 3/28/25    Subjective/Interval:  No acute events.  Pain is well-controlled on current regimen. Denies chest pain, shortness of breath, nausea, vomiting.  Urinating freely.  Tolerating orals.     Objective:  Vitals:    25 1200   BP: (!) 169/83   Pulse: 66   Resp: 17   Temp: 36.3 °C (97.4 °F)   SpO2: 95%     Gen: No acute distress.  Alert and oriented x 3.  Operative extremity:  Prevena intact, holding suction  Hinged knee brace in place, locked in extension  RLE: +DF/PF/EHL, SILT s/s/sp/dp/t (at baseline)  No signs of DVT  Toes warm/well perfused    Labs:  None new    Assessment and plan:  Recovering as expected s/p above operation    #HTN: continue home dose antihypertensive, will follow up with PCP    Weight bearin% WB RLE in HKB, can now unlock to 40 degrees flexion  PT/OT  Dressing: Maintain prevena  ID: ID recs cefazolin for 6 weeks, PICC in place  DVT: SCDs/ASA 81 BID  Dispo: home today with HH, PICC and home infusions   Follow-up: Scheduled. Call [phone] to confirm or change.    Cipriano Valdes M.D.

## 2025-04-02 NOTE — PROGRESS NOTES
Discharging patient home per MD order. Pt demonstrated understanding of discharge instructions, follow up appointments, home medications, prescriptions, and home care for surgical wound. Pt is voiding without difficulty, pain well controlled, tolerating oral medications, O2 greater than 90%.Pt verbalized understanding of discharge instructions and educational handouts and all questions answered. Pt discharged off unit.

## 2025-04-02 NOTE — DISCHARGE PLANNING
Case Management Discharge Planning    Admission Date: 3/28/2025  GMLOS: 3  ALOS: 5    6-Clicks ADL Score: 22  6-Clicks Mobility Score: 17  PT and/or OT Eval ordered: Yes  Post-acute Referrals Ordered: No  Post-acute Choice Obtained: NA  Has referral(s) been sent to post-acute provider:  LAUREN      Anticipated Discharge Dispo: Discharge Disposition: D/T to home under [facility] care in anticipation of covered skilled care (06)  Discharge Address: [address] [place] [address] [address] [place]    DME Needed: No    Action(s) Taken: Updated Provider/Nurse on Discharge Plan    Received voicemail from [facility] with [facility]. Per [employer] insurance auth has been received for HH, and pt has been accepted.   Spoke with Eri from [facility]. Notified Eri of HH being accepted. Eri reported pt will be able to  his antibiotics around 1400 today.     Notified Charge RN pt is good to discharge.     Spoke with pt's wife via phone to notify. Pt's wife requesting advanced directive packet.     1230-  Advanced directive paperwork provided to pt and wife.     Escalations Completed: None    Medically Clear: Yes    Next Steps: LSW to follow    Barriers to Discharge: None    Is the patient up for discharge tomorrow: No

## 2025-04-02 NOTE — PROGRESS NOTES
Received bedside patient report from BRENDA Fierro. Patient resting comfortably in bed, no complaints at this time. Safety precautions in place. Will continue to monitor.

## 2025-04-02 NOTE — PROGRESS NOTES
1500-Received report from Nirmala GUTIERREZ.  Assumed pt care.   Pt is AA0X4, respirations even and unlabored, no signs of distress, denies nausea/vomiting.  Wife at bedside.  PICC line in place.  Fall precautions in place, treaded socks on pt, bed in low position.   Call light within reach.   Educated pt to call if needing anything.   Hourly rounding

## 2025-04-02 NOTE — DISCHARGE PLANNING
Agency/Facility Name: [facility]   Outcome: DPA called and inquired if HH has been accepted.  Yes.

## 2025-04-02 NOTE — PROGRESS NOTES
Received report from night shift RN.   Assumed care of pt.   Assessment and chart check complete.   Pt is AAOX4, respirations even and unlabored, no signs of distress, denies nausea/vomiting.   Updated for the POC of the day.  Dressing CDI with dorsiflex and plantar flex both feet, palpable pulses on both LE.   CMS intact.  Fall precautions in place, treaded socks on pt, bed in low position.   Call light within reach.   Educated pt to call if needing anything.   Hourly rounding.

## 2025-04-02 NOTE — CARE PLAN
The patient is Stable - Low risk of patient condition declining or worsening    Shift Goals  Clinical Goals: Manage pain at tolerable level throughout the shift  Patient Goals: pain control, for discharge    Progress made toward(s) clinical / shift goals:  Given patient  pharmacologic and non pharmacologic modalities for pain management  Managed appropriately per MAR. Fall precaution in place.    Patient is not progressing towards the following goals:      Problem: Fall Risk  Goal: Patient will remain free from falls  Outcome: Progressing     Problem: Post-Operative Knee Replacement  Goal: Patient will demonstrate understanding of knee replacement post-surgical weight bearing restrictions and DME usage during hospital stay and post discharge  Outcome: Progressing  Goal: Patient's neurovascular status will be maintained or improve  Outcome: Progressing  Goal: Early mobilization post surgery  Outcome: Progressing  Goal: Proper fit of orthotic device will be assessed and maintained  Outcome: Progressing     Problem: Pain - Post Surgery  Goal: Alleviation or reduction of pain post surgery  Outcome: Progressing     Problem: Incision Care  Goal: Optimal post surgical incision care  Outcome: Progressing     Problem: Respiratory/Oxygenation Function Post-Surgical  Goal: Patient will achieve/maintain normal respiratory rate/effort  Outcome: Progressing     Problem: Venous Thromboembolism (VTE) Prevention  Goal: The patient will remain free from venous thromboembolism (VTE)  Outcome: Progressing     Problem: Discharge Barriers/Planning  Goal: Patient's continuum of care needs are met  Outcome: Progressing  Flowsheets (Taken 4/2/2025 1345)  Continuum of Care Needs:   Communicated discharge barriers to interdisciplinary tream   Assessed for discharge barriers   Involved patient/family/support system in discharge process   Collaborated with Case Management/   Provided and explained discharge instructions

## 2025-04-07 NOTE — Clinical Note
REFERRAL APPROVAL NOTICE         Sent on April 7, 2025                   Joshua cMkeon  [address] [place] [address]                   Dear Mr. Mckeon,    After a careful review of the medical information and benefit coverage, [facility] has processed your referral. See below for additional details.    If applicable, you must be actively enrolled with your insurance for coverage of the authorized service. If you have any questions regarding your coverage, please contact your insurance directly.    REFERRAL INFORMATION   Referral #:  [id number]  Referred-To Department    Referred-By Provider:  Infectious Diseases    Kori Mariee M.D.   [facility]      [address]  [facility] [address]  [phone] [address] [place] [id number]  [phone]    Referral Start Date:  03/31/2025  Referral End Date:   03/31/2026             SCHEDULING  If you do not already have an appointment, please call [phone] to make an appointment.     MORE INFORMATION  If you do not already have a [facility] account, sign up at: [email or web address]  You can access your medical information, make appointments, see lab results, billing information, and more.  If you have questions regarding this referral, please contact  the [facility] Referrals department at:             [phone]. Monday - Friday 8:00AM - 5:00PM.     Sincerely,    [facility]

## 2025-04-19 NOTE — PROGRESS NOTES
Infectious Disease Follow up Note    This visit was conducted via Zoom using secure and encrypted videoconferencing technology.    The patient was in a private location in the [facility]. The patient's identity was confirmed and verbal consent was obtained for this virtual visit.    Subjective:   No chief complaint on file.      Interval History:   Mr. Bruce is a 65-year-old man with history of a right total knee arthroplasty in 2022 admitted on 3/28/2025 secondary to increasing pain and swelling.  Approximately 1 month prior to that admission, patient sustained an injury and his pain progressed.  He underwent joint aspiration on 3/24/2025 and fluid analysis was reportedly consistent with infection with cultures positive for Streptococcus agalactiae.  On 3/28/2025, he underwent revision of right total knee arthroplasty both components removed of the prior hardware and placement of antibiotic spacer.  The operative note significant purulent material was encountered upon entering the joint as well as extensive synovitis.  Operative cultures were also positive for Streptococcus agalactiae.    The patient was discharged on IV cefazolin 6 g continuous infusion through 5/9/2025.    After patient undergo second stage procedure, will start a 3-month course of Augmentin or cephalexin to minimize risk of reinfection.    Recent outside hospital labs from 4/15/2025 reviewed and are unremarkable.      Hospital records reviewed    Patient is doing well and tolerating IV cefazolin without any issues.  He denies any fever, nausea, abdominal pain or diarrhea.  He is now ambulating with a walker and has significant improvement in his pain.  He will follow-up with orthopedic surgeon on May 27.    Review of Systems   Constitutional:  Negative for chills and fever.       Past Medical History:   Diagnosis Date    Arthritis 2017    Been treated for thsi    Cataract Both eyes    Has both eyes done for cataracts    Delayed emergence  "from general anesthesia 2019    His last knee surgery it took a bit for nate to wake from hsi surgery    Heart burn 2006    Being treated for this with Omeprazole    High cholesterol 2006    Being treated for this    Hypertension     Snoring Has been for a lot of years    Seems to be ok with this       Past Surgical History:   Procedure Laterality Date    PB REVISE KNEE JOINT REPLACE,ALL PARTS Right 3/28/2025    Procedure: REVISION RIGHT TOTAL KNEE ARTHROPLASTY, BOTH COMPONENTS, WITH PLACEMENT OF ARTICULATING ANTIBIOTIC SPACER;  Surgeon: Cipriano Valdes M.D.;  Location: SURGERY [facility];  Service: Orthopedics    PB TOTAL KNEE ARTHROPLASTY  01/31/2022    Procedure: RIGHT TOTAL KNEE ARTHROPLASTY;  Surgeon: Lloyd Garcia M.D.;  Location: [facility];  Service: Orthopedics    LAKISHA BY LAPAROSCOPY  03/13/2018    Procedure: LAKISHA BY LAPAROSCOPY;  Surgeon: James Dill M.D.;  Location: SURGERY [facility];  Service: General    ERCP IN OR  03/12/2018    Procedure: ERCP IN OR;  Surgeon: Victorino Luis M.D.;  Location: SURGERY [facility];  Service: Gastroenterology    OTHER  2017    Gall bladder extraction       Allergies:   Allergies   Allergen Reactions    Lisinopril Swelling     \"throat started to close up\"         Medications:  Current Outpatient Medications on File Prior to Visit   Medication Sig Dispense Refill    HYDROcodone-acetaminophen (NORCO) 5-325 MG Tab per tablet Take 1 Tablet by mouth every four hours as needed (pain) for up to 7 days. 42 Tablet 0    aspirin 81 MG EC tablet Take 1 Tablet by mouth 2 times a day. 60 Tablet 0    docusate sodium 100 MG Cap Take 1 capsule by mouth 2 times a day. 60 Capsule 1    celecoxib (CELEBREX) 200 MG Cap Take 1 Capsule by mouth every day. 60 Capsule 1    Naloxone (NARCAN) 4 MG/0.1ML Liquid One spray in one nostril for overdose and call 911. 1 Each 0    NS SOLN 100 mL with ceFAZolin 2 g SOLR 2 g Infuse 2 g into a venous catheter every 8 " hours. 120 Dose     tamsulosin (FLOMAX) 0.4 MG capsule Take 0.4 mg by mouth 2 times a day.      atorvastatin (LIPITOR) 40 MG Tab Take 1 Tablet by mouth every day.      dilTIAZem CD (CARDIZEM CD) 300 MG CAPSULE SR 24 HR Take  by mouth every day.      metoprolol (TOPROL XL) 200 MG XL tablet Take 1 Tablet by mouth 2 times a day.      multivitamin Tab Take 1 Tablet by mouth every day.      Cholecalciferol (VITAMIN D-3 PO) Take  by mouth.      ECHINACEA PO Take  by mouth.      Coenzyme Q10 (COQ10 PO) Take  by mouth.      Acetaminophen (TYLENOL PO) Take  by mouth.      Cyanocobalamin (VITAMIN B-12 PO) Take  by mouth.      NON SPECIFIED Tart Cherry Extract, Swell no More      allopurinol (ZYLOPRIM) 300 MG Tab Take 300 mg by mouth every day.      tadalafil (CIALIS) 5 MG tablet Take 5 mg by mouth as needed for Erectile Dysfunction.      omeprazole (PRILOSEC) 20 MG delayed-release capsule Take 20 mg by mouth every day.       No current facility-administered medications on file prior to visit.         ROS  As documented above in my HPI       Objective:     PE:  BP (!) 172/100   Pulse 78   Temp 36.1 °C (97 °F)   SpO2 95%      Vital signs reviewed  Constitutional: patient is oriented to person, place, and time. Appears well-developed and well-nourished. No distress  Eyes: Conjunctivae normal and EOM are normal. Pupils are equal, round, and reactive to light.   Mouth/Throat: Lips without lesions, good dentition, oropharynx is clear and moist.  Neck: Trachea midline. Normal range of motion. Neck supple. No masses  Cardiovascular: Normal rate  Pulmonary/Chest: No respiratory distress.  Musculoskeletal: Right knee surgical site with Steri-Strips in place.  No erythema or active drainage.  Left upper extremity PICC line in place   neurological: alert and oriented to person, place, and time. No cranial nerve deficit. Coordination normal. Moves all extremities  Skin: Skin is warm and dry. Good turgor. No rashes  visable.  Psychiatric: Normal mood and affect. Behavior is normal.     LABS:  WBC   Date/Time Value Ref Range Status   03/31/2025 08:31 AM 11.3 (H) 4.8 - 10.8 K/uL Final     RBC   Date/Time Value Ref Range Status   03/31/2025 08:31 AM 3.37 (L) 4.70 - 6.10 M/uL Final     Hemoglobin   Date/Time Value Ref Range Status   03/31/2025 08:31 AM 10.5 (L) 14.0 - 18.0 g/dL Final     Hematocrit   Date/Time Value Ref Range Status   03/31/2025 08:31 AM 33.6 (L) 42.0 - 52.0 % Final     MCV   Date/Time Value Ref Range Status   03/31/2025 08:31 AM 99.7 (H) 81.4 - 97.8 fL Final     MCH   Date/Time Value Ref Range Status   03/31/2025 08:31 AM 31.2 27.0 - 33.0 pg Final     MCHC   Date/Time Value Ref Range Status   03/31/2025 08:31 AM 31.3 (L) 32.3 - 36.5 g/dL Final     MPV   Date/Time Value Ref Range Status   03/31/2025 08:31 AM 9.5 9.0 - 12.9 fL Final        Sodium   Date/Time Value Ref Range Status   03/31/2025 08:31  135 - 145 mmol/L Final     Potassium   Date/Time Value Ref Range Status   03/31/2025 08:31 AM 4.3 3.6 - 5.5 mmol/L Final     Chloride   Date/Time Value Ref Range Status   03/31/2025 08:31  96 - 112 mmol/L Final     Co2   Date/Time Value Ref Range Status   03/31/2025 08:31 AM 28 20 - 33 mmol/L Final     Glucose   Date/Time Value Ref Range Status   03/31/2025 08:31 AM 91 65 - 99 mg/dL Final     Bun   Date/Time Value Ref Range Status   03/31/2025 08:31 AM 15 8 - 22 mg/dL Final     Creatinine   Date/Time Value Ref Range Status   03/31/2025 08:31 AM 0.84 0.50 - 1.40 mg/dL Final     Bun-Creatinine Ratio   Date/Time Value Ref Range Status   01/13/2022 09:00 PM 12 9 - 20 Final     Alkaline Phosphatase   Date/Time Value Ref Range Status   03/28/2025 11:55  (H) 30 - 99 U/L Final     AST(SGOT)   Date/Time Value Ref Range Status   03/28/2025 11:55 AM 26 12 - 45 U/L Final     Comment:     The hemolysis index of the specimen exceeds the allowed tolerance for the  test. Result may be affected.?Specimen recollection  "is recommended to  confirm the result.       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 Mckeon is a 65-year-old male with a history of right total knee arthroplasty.  He was admitted in March secondary to right total knee arthroplasty infection.  Patient underwent hardware removal with antibiotic spacer placement.  Operative cultures were positive for Streptococcus agalactiae.  Patient is currently on IV cefazolin and tolerating.  Recent labs reviewed and unremarkable.    -Continue IV cefazolin 6 g continuous infusion through 5/9/2025  -Okay to remove PICC line after IV antibiotic course  -Weekly CBC with differential, CMP while on IV antibiotics  -Patient will notify our office when he knows timing of his second stage procedure.  -After patient undergoes second stage procedure, will plan a 3-month course of Augmentin 3 months to minimize risk of reinfection.  Prescription sent to his pharmacy in [place].  However patient and wife understand that he should not start this antibiotic until after new hardware is placed  -Follow-up with orthopedic surgeon on 5/27      Follow up: 6-8 weeks. FU with PCP for ongoing chronic medical conditions.     Oumou Martinez M.D.      Please note that this dictation was created using voice recognition software. I have worked with technical experts from [employer]  [facility] to optimize the interface.  I have made every reasonable attempt to correct obvious errors, but there may be errors of grammar and possibly content " that I did not discover before finalizing the note.

## 2025-04-22 ENCOUNTER — TELEMEDICINE (OUTPATIENT)
Dept: INFECTIOUS DISEASES | Facility: MEDICAL CENTER | Age: 65
End: 2025-04-22
Attending: INTERNAL MEDICINE
Payer: COMMERCIAL

## 2025-04-22 VITALS
HEART RATE: 78 BPM | WEIGHT: 240 LBS | HEIGHT: 71 IN | TEMPERATURE: 97 F | BODY MASS INDEX: 33.6 KG/M2 | SYSTOLIC BLOOD PRESSURE: 172 MMHG | DIASTOLIC BLOOD PRESSURE: 100 MMHG | OXYGEN SATURATION: 95 %

## 2025-04-22 DIAGNOSIS — A49.1 STREPTOCOCCAL INFECTION: ICD-10-CM

## 2025-04-22 DIAGNOSIS — T84.53XA INFECTION OF TOTAL RIGHT KNEE REPLACEMENT, INITIAL ENCOUNTER (HCC): ICD-10-CM

## 2025-04-22 PROCEDURE — 99214 OFFICE O/P EST MOD 30 MIN: CPT | Mod: 95 | Performed by: INTERNAL MEDICINE

## 2025-04-22 ASSESSMENT — ENCOUNTER SYMPTOMS
CHILLS: 0
FEVER: 0

## 2025-04-22 ASSESSMENT — FIBROSIS 4 INDEX: FIB4 SCORE: 1.162755348299890642

## 2025-04-23 ENCOUNTER — TELEPHONE (OUTPATIENT)
Dept: INFECTIOUS DISEASES | Facility: MEDICAL CENTER | Age: 65
End: 2025-04-23
Payer: COMMERCIAL

## 2025-04-23 NOTE — TELEPHONE ENCOUNTER
Caller Name: Chula with [facility]  Call Back Number: [phone]  Chula called to report critical lab results   ALP - [phone]  AST - 85

## 2025-04-23 NOTE — TELEPHONE ENCOUNTER
Phone Number Called: [phone]    Call outcome: Spoke to patient regarding message below.    Message: Informed pt that we received phone call from [facility] with critical labs and  requested a recheck of CMP and to schedule a follow up with APRN Shell tomorrow. Pt understood and expressed that he has difficulties traveling into [place] from [place], he will not be able to drive in for a follow up visit and preferred to do virtual visit. Pt scheduled for virtual visit with BERNARD 4/24/25

## 2025-04-23 NOTE — TELEPHONE ENCOUNTER
Phone Number Called: [phone]    Call outcome: Spoke to Chula regarding message below.    Message: Informed Chula that  requests for recheck of [facility]. Chula will fax orders to [facility] to draw.

## 2025-04-23 NOTE — TELEPHONE ENCOUNTER
Rickey Rosas M.D.  You; TAYLOR Topete.  Jayden Sahu,    Can you please request [facility] to recheck another CMP and can we get this patient to see Emmanuel tomorrow?

## 2025-04-24 ENCOUNTER — TELEMEDICINE (OUTPATIENT)
Dept: INFECTIOUS DISEASES | Facility: MEDICAL CENTER | Age: 65
End: 2025-04-24
Attending: NURSE PRACTITIONER
Payer: COMMERCIAL

## 2025-04-24 VITALS
SYSTOLIC BLOOD PRESSURE: 152 MMHG | HEIGHT: 71 IN | RESPIRATION RATE: 94 BRPM | BODY MASS INDEX: 33.6 KG/M2 | DIASTOLIC BLOOD PRESSURE: 84 MMHG | WEIGHT: 240 LBS | HEART RATE: 77 BPM

## 2025-04-24 DIAGNOSIS — T84.53XA INFECTION OF TOTAL RIGHT KNEE REPLACEMENT, INITIAL ENCOUNTER (HCC): ICD-10-CM

## 2025-04-24 DIAGNOSIS — A49.1 STREPTOCOCCAL INFECTION: ICD-10-CM

## 2025-04-24 PROCEDURE — 99214 OFFICE O/P EST MOD 30 MIN: CPT | Mod: 95 | Performed by: NURSE PRACTITIONER

## 2025-04-24 ASSESSMENT — ENCOUNTER SYMPTOMS
WHEEZING: 0
EYE DISCHARGE: 0
DIARRHEA: 0
SHORTNESS OF BREATH: 0
HEADACHES: 0
BRUISES/BLEEDS EASILY: 0
FEVER: 0
BLURRED VISION: 0
ROS SKIN COMMENTS: DENIES JAUNDICE
CHILLS: 0
EYE PAIN: 0
COUGH: 0
MYALGIAS: 0
EYE REDNESS: 0
NAUSEA: 0
VOMITING: 0
SPUTUM PRODUCTION: 0
DOUBLE VISION: 0
FOCAL WEAKNESS: 0
WEIGHT LOSS: 0
PALPITATIONS: 0
NERVOUS/ANXIOUS: 0
PHOTOPHOBIA: 0
CLAUDICATION: 0
CONSTIPATION: 0
ABDOMINAL PAIN: 0
DIZZINESS: 0

## 2025-04-24 ASSESSMENT — FIBROSIS 4 INDEX: FIB4 SCORE: 1.162755348299890642

## 2025-04-24 NOTE — PROGRESS NOTES
INFECTIOUS  DISEASE  OUTPATIENT CLINIC  NOTE   Subjective   Primary care provider: BRYAN Camara.     Reason for Follow Up:   Follow-up for   1. Infection of total right knee replacement, initial encounter ([facility])  Nursing Communication      2. Streptococcal infection  Nursing Communication          HPI: Patient previously seen and treated by ID team as inpatient during hospital admission.   Joshua Mckeon is a  65-year-old man with history of a right total knee arthroplasty in 2022 admitted on 3/28/2025 secondary to increasing pain and swelling.  Approximately 1 month prior to that admission, patient sustained an injury and his pain progressed.  He underwent joint aspiration on 3/24/2025 and fluid analysis was reportedly consistent with infection with cultures positive for Streptococcus agalactiae.  On 3/28/2025, he underwent revision of right total knee arthroplasty both components removed of the prior hardware and placement of antibiotic spacer.  The operative note significant purulent material was encountered upon entering the joint as well as extensive synovitis.  Operative cultures were also positive for Streptococcus agalactiae. The patient was discharged on IV cefazolin 6 g continuous infusion through 5/9/2025.     After patient undergo second stage procedure, will start a 3-month course of Augmentin or cephalexin to minimize risk of reinfection.    4/22/25-  Virtual visit with MD Ferrera.  Patient is doing well and tolerating IV cefazolin without any issues.  He denies any fever, nausea, abdominal pain or diarrhea.  He is now ambulating with a walker and has significant improvement in his pain.  He will follow-up with orthopedic surgeon on May 27. After patient undergoes second stage procedure, will plan a 3-month course of Augmentin 3 months to minimize risk of reinfection.  Prescription sent to his pharmacy in [place].  However patient and wife understand that he should not start this antibiotic  until after new hardware is placed. Patient will notify our office when he knows timing of his second stage procedure     4/23/25- Ciritcal lab results of ALT - 85. ALT - 61, , repeat labs ordered.  Sudden increase in LFTs, possibly lab error compared to previous AST/ALT     04/24/25- This evaluation was conducted via Teams using secure and encrypted videoconferencing technology. The patient was in their home in the [facility].    The patient's identity was confirmed and verbal consent was obtained for this virtual visit.  Today Patient reports feeling well. Pt stating that the wound is healing well.  Denies feeling generally ill, fevers/chills, general malaise, headache, n/v/d, abdominal pain, jaundice, yellowing of eyes..  Most recent lab results reviewed scanned in media tab from 4/22/25 WBC 5.5, stable anemia 11.5/35.0, transaminitis as discussed above.  There is a possibility that this was a bad lab draw as patient reports that nurse had difficulty obtaining blood.  Pending lab work was completed earlier this a.m. and is still pending.  Based upon repeat labs will determine if staying on IV antibiotic therapy versus transitioning to oral antibiotic therapy.  Patient only has 13 days left of treatment at this time which we could potentially transition to p.o. Zyvox 600 mg twice daily for the remaining course.  Zyvox education provided in the setting that if we potentially transition to oral antibiotic therapy.  Will need order to remove PICC line after completion of antibiotic therapy based upon labs.  Updated patient that if abnormal lab findings her team reach out to them but if normal labs continue with current IV antibiotic course.  Discussed plan of care with ID MD Rosas as most likely labs will result over weekend.     Current Antimicrobials: IV cefazolin 6 g continuous infusion, 6-week course with end date 5/9/2025  Previous Antimicrobials:    Other Current Medications:  Home Medications     Medication Sig Taking? Last Dose Authorizing Provider   amoxicillin-clavulanate (AUGMENTIN) 875-125 MG Tab Take 1 Tablet by mouth 2 times a day. Yes Taking Oumou Martinez M.D.   aspirin 81 MG EC tablet Take 1 Tablet by mouth 2 times a day. Yes Taking Cipriano Valdes M.D.   docusate sodium 100 MG Cap Take 1 capsule by mouth 2 times a day. Yes Taking Cipriano Valdes M.D.   celecoxib (CELEBREX) 200 MG Cap Take 1 Capsule by mouth every day. Yes Taking Cipriano Valdes M.D.   NS SOLN 100 mL with ceFAZolin 2 g SOLR 2 g Infuse 2 g into a venous catheter every 8 hours. Yes Taking Cipriano Valdes M.D.   tamsulosin (FLOMAX) 0.4 MG capsule Take 0.4 mg by mouth 2 times a day. Yes Taking Physician Outpatient   atorvastatin (LIPITOR) 40 MG Tab Take 1 Tablet by mouth every day. Yes Taking Physician Outpatient   dilTIAZem CD (CARDIZEM CD) 300 MG CAPSULE SR 24 HR Take  by mouth every day. Yes Taking Physician Outpatient   metoprolol (TOPROL XL) 200 MG XL tablet Take 1 Tablet by mouth 2 times a day. Yes Taking Physician Outpatient   multivitamin Tab Take 1 Tablet by mouth every day. Yes Taking Physician Outpatient   Cholecalciferol (VITAMIN D-3 PO) Take  by mouth. Yes Taking Physician Outpatient   ECHINACEA PO Take  by mouth. Yes Taking Physician Outpatient   Coenzyme Q10 (COQ10 PO) Take  by mouth. Yes Taking Physician Outpatient   Acetaminophen (TYLENOL PO) Take  by mouth. Yes Taking Physician Outpatient   Cyanocobalamin (VITAMIN B-12 PO) Take  by mouth. Yes Taking Physician Outpatient   NON SPECIFIED Tart Cherry Extract, Swell no More Yes Taking Physician Outpatient   allopurinol (ZYLOPRIM) 300 MG Tab Take 300 mg by mouth every day. Yes Taking Physician Outpatient   tadalafil (CIALIS) 5 MG tablet Take 5 mg by mouth as needed for Erectile Dysfunction. Yes Taking Physician Outpatient   omeprazole (PRILOSEC) 20 MG delayed-release capsule Take 20 mg by mouth every day. Yes Taking Physician Outpatient   Naloxone (NARCAN) 4 MG/0.1ML  Liquid One spray in one nostril for overdose and call 911.   Cipriano Valdes M.D.        PMH:  Past Medical History:   Diagnosis Date    Arthritis 2017    Been treated for thsi    Cataract Both eyes    Has both eyes done for cataracts    Delayed emergence from general anesthesia 2019    His last knee surgery it took a bit for nate to wake from hsi surgery    Heart burn 2006    Being treated for this with Omeprazole    High cholesterol 2006    Being treated for this    Hypertension     Snoring Has been for a lot of years    Seems to be ok with this     Past Surgical History:   Procedure Laterality Date    PB REVISE KNEE JOINT REPLACE,ALL PARTS Right 3/28/2025    Procedure: REVISION RIGHT TOTAL KNEE ARTHROPLASTY, BOTH COMPONENTS, WITH PLACEMENT OF ARTICULATING ANTIBIOTIC SPACER;  Surgeon: Cipriano Valdes M.D.;  Location: SURGERY [facility];  Service: Orthopedics    PB TOTAL KNEE ARTHROPLASTY  01/31/2022    Procedure: RIGHT TOTAL KNEE ARTHROPLASTY;  Surgeon: Lloyd Garcia M.D.;  Location: [facility];  Service: Orthopedics    LAKISHA BY LAPAROSCOPY  03/13/2018    Procedure: LAKISHA BY LAPAROSCOPY;  Surgeon: James Dill M.D.;  Location: SURGERY [facility];  Service: General    ERCP IN OR  03/12/2018    Procedure: ERCP IN OR;  Surgeon: Victorino Luis M.D.;  Location: SURGERY [facility];  Service: Gastroenterology    OTHER  2017    Gall bladder extraction     Family History   Problem Relation Age of Onset    Breast Cancer Mother         Is cured from her breast cancer 3x years    Heart Disease Father         Past aways from a heart attack att he age of 57    Hypertension Father     Hyperlipidemia Father     Heart Disease Paternal Uncle         Had triple bypass on his heart     Social History     Socioeconomic History    Marital status:      Spouse name: Not on file    Number of children: Not on file    Years of education: Not on file    Highest education level: Not on file  "  Occupational History    Not on file   Tobacco Use    Smoking status: Never    Smokeless tobacco: Current     Types: Chew    Tobacco comments:     Chews tobacco   Vaping Use    Vaping status: Never Used   Substance and Sexual Activity    Alcohol use: Yes     Alcohol/week: 1.8 - 2.4 oz     Types: 3 - 4 Standard drinks or equivalent per week     Comment: quit    Drug use: Never    Sexual activity: Not on file   Other Topics Concern    Not on file   Social History Narrative    Not on file     Social Drivers of Health     Financial Resource Strain: Not on file   Food Insecurity: No Food Insecurity (3/28/2025)    Hunger Vital Sign     Worried About Running Out of Food in the Last Year: Never true     Ran Out of Food in the Last Year: Never true   Transportation Needs: No Transportation Needs (3/28/2025)    PRAPARE - Transportation     Lack of Transportation (Medical): No     Lack of Transportation (Non-Medical): No   Physical Activity: Not on file   Stress: Not on file   Social Connections: Not on file   Intimate Partner Violence: Not At Risk (3/28/2025)    Humiliation, Afraid, Rape, and Kick questionnaire     Fear of Current or Ex-Partner: No     Emotionally Abused: No     Physically Abused: No     Sexually Abused: No   Housing Stability: Low Risk  (3/28/2025)    Housing Stability Vital Sign     Unable to Pay for Housing in the Last Year: No     Number of Times Moved in the Last Year: 0     Homeless in the Last Year: No           Allergies/Intolerances:  Allergies   Allergen Reactions    Lisinopril Swelling     \"throat started to close up\"       ROS:   Review of Systems   Constitutional:  Negative for chills, fever, malaise/fatigue and weight loss.   HENT:  Negative for congestion and hearing loss.    Eyes:  Negative for blurred vision, double vision, photophobia, pain, discharge and redness.   Respiratory:  Negative for cough, sputum production, shortness of breath and wheezing.    Cardiovascular:  Negative for chest " "pain, palpitations, claudication and leg swelling.   Gastrointestinal:  Negative for abdominal pain, constipation, diarrhea, nausea and vomiting.   Genitourinary:  Negative for dysuria, frequency, hematuria and urgency.   Musculoskeletal:  Positive for joint pain (Right knee, improving). Negative for myalgias.   Skin:  Negative for itching and rash.        Denies jaundice   Neurological:  Negative for dizziness, focal weakness and headaches.   Endo/Heme/Allergies:  Does not bruise/bleed easily.   Psychiatric/Behavioral:  The patient is not nervous/anxious.       ROS was reviewed and were negative except as above.    Objective    Most Recent Vital Signs:  BP (!) 152/84   Pulse 77   Resp (!) 94   Ht 1.803 m (5' 11\")   Wt 109 kg (240 lb)   BMI 33.47 kg/m²     Physical Exam:  Physical Exam     Pertinent Lab/Imaging Results:  [unfilled]  @CMP@  WBC   Date/Time Value Ref Range Status   03/31/2025 08:31 AM 11.3 (H) 4.8 - 10.8 K/uL Final     RBC   Date/Time Value Ref Range Status   03/31/2025 08:31 AM 3.37 (L) 4.70 - 6.10 M/uL Final     Hemoglobin   Date/Time Value Ref Range Status   03/31/2025 08:31 AM 10.5 (L) 14.0 - 18.0 g/dL Final     Hematocrit   Date/Time Value Ref Range Status   03/31/2025 08:31 AM 33.6 (L) 42.0 - 52.0 % Final     MCV   Date/Time Value Ref Range Status   03/31/2025 08:31 AM 99.7 (H) 81.4 - 97.8 fL Final     MCH   Date/Time Value Ref Range Status   03/31/2025 08:31 AM 31.2 27.0 - 33.0 pg Final     MCHC   Date/Time Value Ref Range Status   03/31/2025 08:31 AM 31.3 (L) 32.3 - 36.5 g/dL Final     MPV   Date/Time Value Ref Range Status   03/31/2025 08:31 AM 9.5 9.0 - 12.9 fL Final      Sodium   Date/Time Value Ref Range Status   03/31/2025 08:31  135 - 145 mmol/L Final     Potassium   Date/Time Value Ref Range Status   03/31/2025 08:31 AM 4.3 3.6 - 5.5 mmol/L Final     Chloride   Date/Time Value Ref Range Status   03/31/2025 08:31  96 - 112 mmol/L Final     Co2   Date/Time Value Ref Range " "Status   03/31/2025 08:31 AM 28 20 - 33 mmol/L Final     Glucose   Date/Time Value Ref Range Status   03/31/2025 08:31 AM 91 65 - 99 mg/dL Final     Bun   Date/Time Value Ref Range Status   03/31/2025 08:31 AM 15 8 - 22 mg/dL Final     Creatinine   Date/Time Value Ref Range Status   03/31/2025 08:31 AM 0.84 0.50 - 1.40 mg/dL Final     Bun-Creatinine Ratio   Date/Time Value Ref Range Status   01/13/2022 09:00 PM 12 9 - 20 Final     Alkaline Phosphatase   Date/Time Value Ref Range Status   03/28/2025 11:55  (H) 30 - 99 U/L Final     AST(SGOT)   Date/Time Value Ref Range Status   03/28/2025 11:55 AM 26 12 - 45 U/L Final     Comment:     The hemolysis index of the specimen exceeds the allowed tolerance for the  test. Result may be affected.?Specimen recollection is recommended to  confirm the result.       ALT(SGPT)   Date/Time Value Ref Range Status   03/28/2025 11:55 AM 20 2 - 50 U/L Final     Total Bilirubin   Date/Time Value Ref Range Status   03/28/2025 11:55 AM 0.8 0.1 - 1.5 mg/dL Final      No results found for: \"CPKTOTAL\"       No results found for: \"BLOODCULTU\", \"BLDCULT\", \"BCHOLD\"    No results found for: \"BLOODCULTU\", \"BLDCULT\", \"BCHOLD\"       FLUID CULTURE W/GRAM STAIN  Order: [id number] - Reflex for Order [id number]   Status: Edited Result - FINAL       Next appt: Today at 02:30 PM in Infectious Diseases (Feliberto Mathews A.P.R.N.)    Test Result Released: Yes (seen)    Specimen Information: Synovial   0 Result Notes      Component  Ref Range & Units (hover) 1 mo ago   Significant Indicator POS Positive (POS)   Source SYNO   Site right knee   Culture Result - Abnormal    Gram Stain Result Many WBCs.  No organisms seen.   Culture Result  Abnormal   Streptococcus agalactiae (Group B)  Light growth  This isolate does NOT demonstrate inducible Clindamycin  resistance in vitro.    Resulting Agency M        Susceptibility     Streptococcus agalactiae (group b)     JOHNIE     Daptomycin <=0.5 mcg/mL Sensitive     " Penicillin <=0.03 mcg/mL Sensitive                 Specimen Collected: 03/24/25  7:30 AM Last Resulted: 03/26/25  7:40 AM   ntains abnormal data FLUID CULTURE W/GRAM STAIN  Order: [id number] - Reflex for Order [id number]   Status: Edited Result - FINAL       Next appt: Today at 02:30 PM in Infectious Diseases (Feliberto Shell, A.P.R.N.)    Test Result Released: Yes (not seen)    Specimen Information: Synovial   0 Result Notes      Component  Ref Range & Units (hover) 3 wk ago   Significant Indicator POS Positive (POS)   Source SYNO   Site Right Knee   Culture Result - Abnormal    Gram Stain Result Testing performed at:  [facility]  [address] [place] [address]  Many WBCs.  Rare Gram positive cocci.   Culture Result  Abnormal   Streptococcus agalactiae (Group B)  Moderate growth  This isolate does NOT demonstrate inducible Clindamycin  resistance  in vitro.    Resulting Agency M        Susceptibility     Streptococcus agalactiae (group b)     JOHNIE     Daptomycin <=0.5 mcg/mL Sensitive     Penicillin <=0.03 mcg/mL Sensitive                  Narrative  Performed by: RENETTA  1 - synovial fluid   Specimen Collected: 03/28/25  3:18 PM Last Resulted: 03/31/25  4:39 PM       CULTURE TISSUE W/ GRM STAIN  Order: [id number] - Reflex for Order [id number]   Status: Final result       Next appt: Today at 02:30 PM in Infectious Diseases (Feliberto Shell, A.P.R.N.)    Test Result Released: Yes (not seen)    Specimen Information: Tissue   0 Result Notes      Component  Ref Range & Units (hover) 3 wk ago   Significant Indicator POS Positive (POS)   Source TISS   Site RIGHT KNEE 3   Culture Result - Abnormal    Gram Stain Result Few WBCs.  No organisms seen.   Culture Result  Abnormal   Streptococcus agalactiae (Group B)  Moderate growth  See previous culture for sensitivity report.    Resulting Agency M              Narrative  Performed by: RENETTA  4- RIGHT KNEE 3   Specimen Collected: 03/28/25  3:18 PM Last Resulted:  03/30/25  2:38 PM   DX-KNEE 2- RIGHT  Order: [id number]   Status: Final result       Next appt: Today at 02:30 PM in Infectious Diseases (BRYAN Topete)    Test Result Released: Yes (not seen)    0 Result Notes  Details    Reading Physician Reading Date Result Priority   Michael Solsi M.D.  [phone]     3/28/2025      Narrative & Impression     3/28/2025 6:04 PM     HISTORY/REASON FOR EXAM: .  Revision TKA     TECHNIQUE/EXAM DESCRIPTION AND NUMBER OF VIEWS:  2 views of the RIGHT knee.     COMPARISON: None     FINDINGS:  The patient is status post revision total knee arthroplasty with patellar resurfacing. Hardware is intact. There is no definite fracture or dislocation. Soft tissue and intra-articular gas is consistent with recent surgery.  There is a chronic, healed   fracture proximal fibula.     IMPRESSION:     Revision total knee arthroplasty with no definite acute abnormality.        Exam Ended: 03/28/25  6:17 PM Last Resulted: 03/28/25  6:29 PM         Impression/Assessment      1. Infection of total right knee replacement, initial encounter ([facility])  Nursing Communication      2. Streptococcal infection  Nursing Communication      Joshua Mckeon is a  65-year-old man with history of a right total knee arthroplasty in 2022 admitted on 3/28/2025 secondary to increasing pain and swelling.  Approximately 1 month prior to that admission, patient sustained an injury and his pain progressed.  He underwent joint aspiration on 3/24/2025 and fluid analysis was reportedly consistent with infection with cultures positive for Streptococcus agalactiae.  On 3/28/2025, he underwent revision of right total knee arthroplasty both components removed of the prior hardware and placement of antibiotic spacer.  The operative note significant purulent material was encountered upon entering the joint as well as extensive synovitis.  Operative cultures were also positive for Streptococcus agalactiae. The patient was  discharged on IV cefazolin 6 g continuous infusion through 5/9/2025.     After patient undergo second stage procedure, will start a 3-month course of Augmentin or cephalexin to minimize risk of reinfection.      4/23/25- Ciritcal lab results of ALT - 85. ALT - 61, , repeat labs ordered.  Sudden increase in LFTs, possibly lab error compared to previous AST/ALT     04/24/25- This evaluation was conducted via Teams using secure and encrypted videoconferencing technology. The patient was in their home in the [facility].    The patient's identity was confirmed and verbal consent was obtained for this virtual visit.  Today Patient reports feeling well. Pt stating that the wound is healing well.  Denies feeling generally ill, fevers/chills, general malaise, headache, n/v/d, abdominal pain, jaundice, yellowing of eyes..  Most recent lab results reviewed scanned in media tab from 4/22/25 WBC 5.5, stable anemia 11.5/35.0, transaminitis as discussed above.  There is a possibility that this was a bad lab draw as patient reports that nurse had difficulty obtaining blood.  Pending lab work was completed earlier this a.m. and is still pending.  Based upon repeat labs will determine if staying on IV antibiotic therapy versus transitioning to oral antibiotic therapy.  Patient only has 13 days left of treatment at this time which we could potentially transition to p.o. Zyvox 600 mg twice daily for the remaining course.  Zyvox education provided in the setting that if we potentially transition to oral antibiotic therapy.  Will need order to remove PICC line after completion of antibiotic therapy based upon labs.  Updated patient that if abnormal lab findings her team reach out to them but if normal labs continue with current IV antibiotic course      - This infection/ chronic illness posses a threat to life, bodily function, and limb preservation   PLAN:   - Continue 6-week course of IV cefazolin 6 g continuous infusion  Till end date 5/9/2025.  After second stage reimplantation will transition to 3 months of p.o. Augmentin 875/125 mg twice daily. Discussed plan of care with ID MD Rosas as most likely labs will result over weekend.   - Based upon repeat labs will determine if staying on IV antibiotic therapy versus transitioning to oral antibiotic therapy.  Patient only has 13 days left of treatment at this time which we could potentially transition to p.o. Zyvox 600 mg twice daily for the remaining course.  Elevated alk phos for quite some time compared to previous labs there is possibility of patient getting a repeat ultrasound/CT scan of the abdomen for workup for elevated alk phos.  - Repeat Labs CBC/CMP weekly. Monitoring for side effects, medication toxicity, medication interactions, and for infection  - Medication education provided and S/S of side effects discussed   - Recommend routine follow up with orthopedic surgery.  Pending date for joint aspiration/second stage reimplantation  - Continue wound care to right knee  - Recommended to start po probiotic      Return visit: 2 weeks. Follow up with primary care physician for chronic medical problems    I have performed a physical exam,  updated ROS and plan today. I have reviewed previous images, labs, and provider notes.      TAYLOR Topete.    All Patients should seek medical re-evaluation or report to the ER for new, increasing or worsening symptoms. In some circumstances medical conditions can change from the initial evaluation and may require emergent medical re-evaluation. This includes but is not limited to chest pain, shortness of breath, atypical abdominal pain, atypical headache, ALOC, fever >101, low blood pressure, high respiratory rate (above 30), low oxygen saturation (below 90%), acute delirium, abnormal bleeding, inability to tolerate any intake, weakness on one side of the body, any worsened or concerning conditions.    Please note that this  dictation was created using voice recognition software. I have worked with technical experts from [facility] to optimize the interface.  I have made every reasonable attempt to correct obvious errors, but there may be errors of grammar and possibly content that I did not discover before finalizing the note.

## 2025-04-25 ENCOUNTER — TELEPHONE (OUTPATIENT)
Dept: INFECTIOUS DISEASES | Facility: MEDICAL CENTER | Age: 65
End: 2025-04-25
Payer: COMMERCIAL

## 2025-04-25 DIAGNOSIS — T84.53XA INFECTION OF TOTAL RIGHT KNEE REPLACEMENT, INITIAL ENCOUNTER (HCC): ICD-10-CM

## 2025-04-25 DIAGNOSIS — A49.1 STREPTOCOCCAL INFECTION: ICD-10-CM

## 2025-04-25 RX ORDER — LINEZOLID 600 MG/1
600 TABLET, FILM COATED ORAL 2 TIMES DAILY
Qty: 28 TABLET | Refills: 0 | Status: SHIPPED | OUTPATIENT
Start: 2025-04-25 | End: 2025-05-09

## 2025-04-25 NOTE — PROGRESS NOTES
After further review of labs concerning that the alk phos may continue to possibly trend upwards even further.  Possibly due to cefazolin, stop cefazolin and start 14-day course of p.o. linezolid 600 mg twice daily.  Okay to remove PICC line today

## 2025-04-25 NOTE — TELEPHONE ENCOUNTER
VOICEMAIL  1. Caller Name: Caitlin   Call Back Number: [phone]    2. Message: Question about pt Rx

## 2025-04-25 NOTE — TELEPHONE ENCOUNTER
Phone Number Called: [phone]    Call outcome: Spoke to patient spouse regarding message below.    Message: Informed spouse that [facility] is to fill Linezolid rx and no more weekly labs per APRN Shell. He is going to place orders for pt to repeat CBC and CMP in 1 week. Once orders placed I will attach orders in [employer] message to pt and he can print out and take to his preferred lab. Pt wife in agree ance.

## 2025-04-25 NOTE — TELEPHONE ENCOUNTER
BRYAN Topete [facility]  Can you call and update this patient that we can have him stop his cefazolin today 4/25.  I sent in a 2-week supply of p.o. Zyvox 600 mg twice daily to his pharmacy [facility] in [place].  We also need to fax my nursing communication order that we are stopping antibiotic to the nursing team as well as the infusion services.  Okay to remove PICC line today 4/25 or within the next 24-48 hours

## 2025-04-25 NOTE — TELEPHONE ENCOUNTER
Phone Number Called: [phone]    Call outcome: Spoke to Pharmacy regarding message below.    Message: Checking status on Linezolid Rx, [facility] received rx, however does not have enough tablets on hand to fulfill rx, has 20 tablets on hand. Will contact pt for alternative pharmacy.

## 2025-04-25 NOTE — TELEPHONE ENCOUNTER
Phone Number Called: [phone] - [facility]    Call outcome: Spoke to pharmacist regarding message below.    Message: Provided verbal rx to pharmacist Prakash with [facility]. Prakash to process rx, mentioned this drug is rarely filled with their pharmacy so will call office with any questions.

## 2025-04-25 NOTE — TELEPHONE ENCOUNTER
Phone Number Called: [phone]    Call outcome: Spoke to patient spouse regarding message below.    Message: Informed pt spouse that [facility] does not have enough tablets to fill Linezolid rx, pt spouse requested we send rx to [facility]. Pt spouse also asking if weekly lab draws are to continue with Piccline being removed. Will verify pt question w APRN

## 2025-04-25 NOTE — PROGRESS NOTES
Repeat labs finalized from 4/24/25 scanned into media tab,   AST 29, ALT 24, elevation alk phos 389  Continue with IV antibiotic therapy until original end date

## 2025-04-25 NOTE — TELEPHONE ENCOUNTER
VOICEMAIL  1. Caller Name: Joshua  Call Back Number: [phone]    2. Message: Pt stated he needs to follow up shaunna Sahu about Rx

## 2025-04-25 NOTE — TELEPHONE ENCOUNTER
Phone Number Called: [phone]    Call outcome: Spoke to patient regarding message below.    Message: Relayed message to Pt and Pt spouse Caitlin, pt requested that I specify when faxing nursing orders that he requested to have nurse Marianna remove Piccline.     [facility]  Can you call and update this patient that we can have him stop his cefazolin today 4/25.  I sent in a 2-week supply of p.o. Zyvox 600 mg twice daily to his pharmacy [facility] in [place].  We also need to fax my nursing communication order that we are stopping antibiotic to the nursing team as well as the infusion services.  Okay to remove PICC line today 4/25 or within the next 24-48 hours

## 2025-05-27 ENCOUNTER — HOSPITAL ENCOUNTER (OUTPATIENT)
Facility: MEDICAL CENTER | Age: 65
End: 2025-05-27
Attending: STUDENT IN AN ORGANIZED HEALTH CARE EDUCATION/TRAINING PROGRAM
Payer: COMMERCIAL

## 2025-05-27 PROBLEM — Z86.19 HISTORY OF REMOVAL OF JOINT PROSTHESIS OF RIGHT KNEE DUE TO INFECTION: Status: ACTIVE | Noted: 2025-05-27

## 2025-05-27 PROBLEM — Z98.890 HISTORY OF REMOVAL OF JOINT PROSTHESIS OF RIGHT KNEE DUE TO INFECTION: Status: ACTIVE | Noted: 2025-05-27

## 2025-05-27 LAB
APPEARANCE FLD: NORMAL
BODY FLD TYPE: NORMAL
COLOR FLD: YELLOW
CRYSTALS FLD MICRO: NORMAL
GRAM STN SPEC: NORMAL
LYMPHOCYTES NFR FLD: 30 %
MONOS+MACROS NFR FLD MANUAL: 39 %
NEUTROPHILS NFR FLD: 31 %
NUC CELL # FLD: 1092 CELLS/UL
RBC # FLD: NORMAL CELLS/UL
SIGNIFICANT IND 70042: NORMAL
SITE SITE: NORMAL
SOURCE SOURCE: NORMAL

## 2025-05-27 PROCEDURE — 87070 CULTURE OTHR SPECIMN AEROBIC: CPT

## 2025-05-27 PROCEDURE — 89060 EXAM SYNOVIAL FLUID CRYSTALS: CPT

## 2025-05-27 PROCEDURE — 87205 SMEAR GRAM STAIN: CPT

## 2025-05-27 PROCEDURE — 89051 BODY FLUID CELL COUNT: CPT

## 2025-05-30 LAB
BACTERIA FLD AEROBE CULT: NORMAL
GRAM STN SPEC: NORMAL
SIGNIFICANT IND 70042: NORMAL
SITE SITE: NORMAL
SOURCE SOURCE: NORMAL

## 2025-06-02 ENCOUNTER — TELEPHONE (OUTPATIENT)
Dept: INFECTIOUS DISEASES | Facility: MEDICAL CENTER | Age: 65
End: 2025-06-02
Payer: COMMERCIAL

## 2025-06-02 DIAGNOSIS — A49.1 STREPTOCOCCAL INFECTION: ICD-10-CM

## 2025-06-02 DIAGNOSIS — T84.53XA INFECTION OF TOTAL RIGHT KNEE REPLACEMENT, INITIAL ENCOUNTER (HCC): Primary | ICD-10-CM

## 2025-06-02 NOTE — TELEPHONE ENCOUNTER
Caller Name: Caitlin   Call Back Number: [phone]    How would the patient prefer to be contacted with a response: Phone call OK to leave a detailed message    Caitlin called asking for Adelina or BERNARD. I informed Caitlin that the two of them will return to clinic tomorrow. She informed me that Joshua will be getting an operation on 06/13 to remove his knee due to his infection. Caitlin is requesting a call from BERNARD to go over abx pt is supposed to start for about 6mo.

## 2025-06-03 NOTE — TELEPHONE ENCOUNTER
Phone Number Called: [phone]    Call outcome: Spoke to patient regarding message below.    Message: I called Joshua and Caitlin to relay APRN mssg. Caitlin and Joshua both understood and stated if there are any difficulties w Joshua making his appt on the 18th they will call ID clinic.     BRYAN Topete to Me (Selected Message)  ZS      6/2/25  2:21 PM  AFTER second stage reimplantation will transition to 3 months of p.o. Augmentin 875/125 mg twice daily. Most recent aspiration was negative for infection. Do not start until AFTER second knee in replaced. Will send in more refills during visit on 6/18 if needed. Repeat Labs CBC/CMP 1 week after 2nd stage reimplantation. Ok to eat and drink for labs.

## 2025-06-16 ASSESSMENT — ENCOUNTER SYMPTOMS
PALPITATIONS: 0
HEADACHES: 0
VOMITING: 0
FOCAL WEAKNESS: 0
BRUISES/BLEEDS EASILY: 0
NAUSEA: 0
FEVER: 0
CLAUDICATION: 0
WEIGHT LOSS: 0
EYE REDNESS: 0
DIZZINESS: 0
EYE PAIN: 0
EYE DISCHARGE: 0
ABDOMINAL PAIN: 0
DOUBLE VISION: 0
BLURRED VISION: 0
CHILLS: 0
NERVOUS/ANXIOUS: 0
WHEEZING: 0
CONSTIPATION: 0
SHORTNESS OF BREATH: 0
SPUTUM PRODUCTION: 0
DIARRHEA: 0
ROS SKIN COMMENTS: DENIES JAUNDICE
PHOTOPHOBIA: 0
MYALGIAS: 0
COUGH: 0

## 2025-06-17 NOTE — PROGRESS NOTES
INFECTIOUS  DISEASE  OUTPATIENT CLINIC  NOTE   Subjective   Primary care provider: BRYAN Camara.     Reason for Follow Up:   Follow-up for   1. Infection of total right knee replacement, initial encounter ([facility])  CBC WITH DIFFERENTIAL    Comp Metabolic Panel      2. Streptococcal infection  CBC WITH DIFFERENTIAL    Comp Metabolic Panel        HPI: Patient previously seen and treated by ID team as inpatient during hospital admission.   Joshua Mckeon is a  65-year-old man with history of a right total knee arthroplasty in 2022 admitted on 3/28/2025 secondary to increasing pain and swelling.  Approximately 1 month prior to that admission, patient sustained an injury and his pain progressed.  He underwent joint aspiration on 3/24/2025 and fluid analysis was reportedly consistent with infection with cultures positive for Streptococcus agalactiae.  On 3/28/2025, he underwent revision of right total knee arthroplasty both components removed of the prior hardware and placement of antibiotic spacer.  The operative note significant purulent material was encountered upon entering the joint as well as extensive synovitis.  Operative cultures were also positive for Streptococcus agalactiae. The patient was discharged on IV cefazolin 6 g continuous infusion through 5/9/2025.     After patient undergo second stage procedure, will start a 3-month course of Augmentin or cephalexin to minimize risk of reinfection.    4/22/25-  Virtual visit with MD Ferrera.  Patient is doing well and tolerating IV cefazolin without any issues.  He denies any fever, nausea, abdominal pain or diarrhea.  He is now ambulating with a walker and has significant improvement in his pain.  He will follow-up with orthopedic surgeon on May 27. After patient undergoes second stage procedure, will plan a 3-month course of Augmentin 3 months to minimize risk of reinfection.  Prescription sent to his pharmacy in [place].  However patient and wife  understand that he should not start this antibiotic until after new hardware is placed. Patient will notify our office when he knows timing of his second stage procedure     4/23/25- Ciritcal lab results of ALT - 85. ALT - 61, , repeat labs ordered.  Sudden increase in LFTs, possibly lab error compared to previous AST/ALT     04/24/25- This evaluation was conducted via Teams using secure and encrypted videoconferencing technology. The patient was in their home in the [facility].    The patient's identity was confirmed and verbal consent was obtained for this virtual visit.  Today Patient reports feeling well. Pt stating that the wound is healing well.  Denies feeling generally ill, fevers/chills, general malaise, headache, n/v/d, abdominal pain, jaundice, yellowing of eyes..  Most recent lab results reviewed scanned in media tab from 4/22/25 WBC 5.5, stable anemia 11.5/35.0, transaminitis as discussed above.  There is a possibility that this was a bad lab draw as patient reports that nurse had difficulty obtaining blood.  Pending lab work was completed earlier this a.m. and is still pending.  Based upon repeat labs will determine if staying on IV antibiotic therapy versus transitioning to oral antibiotic therapy.  Patient only has 13 days left of treatment at this time which we could potentially transition to p.o. Zyvox 600 mg twice daily for the remaining course.  Zyvox education provided in the setting that if we potentially transition to oral antibiotic therapy.  Will need order to remove PICC line after completion of antibiotic therapy based upon labs.  Updated patient that if abnormal lab findings her team reach out to them but if normal labs continue with current IV antibiotic course.  Discussed plan of care with ID MD Rosas as most likely labs will result over weekend.     5/27/25-joint aspiration while off antibiotic therapy.  Negative to date, sed rate 4, CRP 5    6/2/25- AFTER second stage  reimplantation will transition to 3 months of p.o. Augmentin 875/125 mg twice daily. Most recent aspiration was negative for infection. Do not start until AFTER second knee in replaced. Will send in more refills during visit on 6/18 if needed. Repeat Labs CBC/CMP 1 week after 2nd stage reimplantation. Ok to eat and drink for labs.     6/18/25- This evaluation was conducted via Teams using secure and encrypted videoconferencing technology. The patient was in their home in the [facility].    The patient's identity was confirmed and verbal consent was obtained for this virtual visit.  Patient following up after joint aspiration which has been negative to date.  Patient did report though he developed a UTI and is finishing his 10-day course of ciprofloxacin.  He has been tolerating ciprofloxacin with no complaints of side effects.  Today denies any nausea, vomiting, fever, chills, constipation or diarrhea.  Overall feeling well.  Surgery was pushed back to 6/25/2025 due to his UTI.  Previous labs scanned in media tab from 5/2/2025 reviewed and discussed with patient.  Repeat lab work CBC/CMP sometime this week and again in 2 weeks after he starts Augmentin.  Patient is not to start Augmentin until after procedure will be on for the next 3 months in order to prevent reoccurrence of infection.  Recommend to start daily probiotic.  Follow-up with ID clinic in 2 weeks.  Okay for virtual visit      Current Antimicrobials: 3-month course of p.o. Augmentin 875/125 mg twice daily, end date 9/25/2025  previous Antimicrobials: Cefazolin    Other Current Medications:  Home Medications   Medication Sig Taking? Last Dose Authorizing Provider   ciprofloxacin (CIPRO) 500 MG Tab Take 500 mg by mouth 2 times a day. Yes  Physician Outpatient   aspirin 81 MG EC tablet Take 1 Tablet by mouth 2 times a day. Yes  Cipriano Valdes M.D.   docusate sodium 100 MG Cap Take 1 capsule by mouth 2 times a day. Yes  Cipriano Valdes M.D.   celecoxib  (CELEBREX) 200 MG Cap Take 1 Capsule by mouth every day. Yes  Cipriano Valdes M.D.   tamsulosin (FLOMAX) 0.4 MG capsule Take 0.4 mg by mouth 2 times a day. Yes  Physician Outpatient   atorvastatin (LIPITOR) 40 MG Tab Take 1 Tablet by mouth every day. Yes  Physician Outpatient   dilTIAZem CD (CARDIZEM CD) 300 MG CAPSULE SR 24 HR Take  by mouth every day. Yes  Physician Outpatient   metoprolol (TOPROL XL) 200 MG XL tablet Take 1 Tablet by mouth 2 times a day. Yes  Physician Outpatient   Cholecalciferol (VITAMIN D-3 PO) Take  by mouth. Yes  Physician Outpatient   ECHINACEA PO Take  by mouth. Yes  Physician Outpatient   NON SPECIFIED Tart Cherry Extract, Swell no More Yes  Physician Outpatient   allopurinol (ZYLOPRIM) 300 MG Tab Take 300 mg by mouth every day. Yes  Physician Outpatient   tadalafil (CIALIS) 5 MG tablet Take 5 mg by mouth as needed for Erectile Dysfunction. Yes  Physician Outpatient   omeprazole (PRILOSEC) 20 MG delayed-release capsule Take 20 mg by mouth every day. Yes  Physician Outpatient   amoxicillin-clavulanate (AUGMENTIN) 875-125 MG Tab Take 1 Tablet by mouth 2 times a day.  Patient not taking: Reported on 6/18/2025   Oumou Martinez M.D.        PMH:  Past Medical History:   Diagnosis Date    Arthritis 2017    Been treated for thsi    Cataract Both eyes    Has both eyes done for cataracts    Delayed emergence from general anesthesia 2019    His last knee surgery it took a bit for nate to wake from hsi surgery    Heart burn 2006    Being treated for this with Omeprazole    High cholesterol 2006    Being treated for this    Hypertension     Snoring Has been for a lot of years    Seems to be ok with this     Past Surgical History:   Procedure Laterality Date    PB REVISE KNEE JOINT REPLACE,ALL PARTS Right 3/28/2025    Procedure: REVISION RIGHT TOTAL KNEE ARTHROPLASTY, BOTH COMPONENTS, WITH PLACEMENT OF ARTICULATING ANTIBIOTIC SPACER;  Surgeon: Cipriano Valdes M.D.;  Location: SURGERY [facility];   Service: Orthopedics    PB TOTAL KNEE ARTHROPLASTY  01/31/2022    Procedure: RIGHT TOTAL KNEE ARTHROPLASTY;  Surgeon: Lloyd Garcia M.D.;  Location: [facility];  Service: Orthopedics    LAKISHA BY LAPAROSCOPY  03/13/2018    Procedure: LAKISHA BY LAPAROSCOPY;  Surgeon: James Dill M.D.;  Location: SURGERY [facility];  Service: General    ERCP IN OR  03/12/2018    Procedure: ERCP IN OR;  Surgeon: Victorino Luis M.D.;  Location: SURGERY [facility];  Service: Gastroenterology    OTHER  2017    Gall bladder extraction     Family History   Problem Relation Age of Onset    Breast Cancer Mother         Is cured from her breast cancer 3x years    Heart Disease Father         Past aways from a heart attack att he age of 57    Hypertension Father     Hyperlipidemia Father     Heart Disease Paternal Uncle         Had triple bypass on his heart     Social History     Socioeconomic History    Marital status:      Spouse name: Not on file    Number of children: Not on file    Years of education: Not on file    Highest education level: Not on file   Occupational History    Not on file   Tobacco Use    Smoking status: Never    Smokeless tobacco: Current     Types: Chew    Tobacco comments:     Chews tobacco   Vaping Use    Vaping status: Never Used   Substance and Sexual Activity    Alcohol use: Yes     Alcohol/week: 1.8 - 2.4 oz     Types: 3 - 4 Standard drinks or equivalent per week     Comment: quit    Drug use: Never    Sexual activity: Not on file   Other Topics Concern    Not on file   Social History Narrative    Not on file     Social Drivers of Health     Financial Resource Strain: Not on file   Food Insecurity: No Food Insecurity (3/28/2025)    Hunger Vital Sign     Worried About Running Out of Food in the Last Year: Never true     Ran Out of Food in the Last Year: Never true   Transportation Needs: No Transportation Needs (3/28/2025)    PRAPARE - Transportation     Lack of  "Transportation (Medical): No     Lack of Transportation (Non-Medical): No   Physical Activity: Not on file   Stress: Not on file   Social Connections: Not on file   Intimate Partner Violence: Not At Risk (3/28/2025)    Humiliation, Afraid, Rape, and Kick questionnaire     Fear of Current or Ex-Partner: No     Emotionally Abused: No     Physically Abused: No     Sexually Abused: No   Housing Stability: Low Risk  (3/28/2025)    Housing Stability Vital Sign     Unable to Pay for Housing in the Last Year: No     Number of Times Moved in the Last Year: 0     Homeless in the Last Year: No           Allergies/Intolerances:  Allergies   Allergen Reactions    Lisinopril Swelling     \"throat started to close up\"       ROS:   Review of Systems   Constitutional:  Negative for chills, fever, malaise/fatigue and weight loss.   HENT:  Negative for congestion and hearing loss.    Eyes:  Negative for blurred vision, double vision, photophobia, pain, discharge and redness.   Respiratory:  Negative for cough, sputum production, shortness of breath and wheezing.    Cardiovascular:  Negative for chest pain, palpitations, claudication and leg swelling.   Gastrointestinal:  Negative for abdominal pain, constipation, diarrhea, nausea and vomiting.   Genitourinary:  Negative for dysuria, frequency, hematuria and urgency.   Musculoskeletal:  Positive for joint pain (Right knee, improving). Negative for myalgias.   Skin:  Negative for itching and rash.        Denies jaundice   Neurological:  Negative for dizziness, focal weakness and headaches.   Endo/Heme/Allergies:  Does not bruise/bleed easily.   Psychiatric/Behavioral:  The patient is not nervous/anxious.       ROS was reviewed and were negative except as above.    Objective    Most Recent Vital Signs:  There were no vitals taken for this visit.    Physical Exam:  Physical Exam     Pertinent Lab/Imaging Results:  [unfilled]  @CMP@  WBC   Date/Time Value Ref Range Status   03/31/2025 08:31 " AM 11.3 (H) 4.8 - 10.8 K/uL Final     RBC   Date/Time Value Ref Range Status   03/31/2025 08:31 AM 3.37 (L) 4.70 - 6.10 M/uL Final     Hemoglobin   Date/Time Value Ref Range Status   03/31/2025 08:31 AM 10.5 (L) 14.0 - 18.0 g/dL Final     Hematocrit   Date/Time Value Ref Range Status   03/31/2025 08:31 AM 33.6 (L) 42.0 - 52.0 % Final     MCV   Date/Time Value Ref Range Status   03/31/2025 08:31 AM 99.7 (H) 81.4 - 97.8 fL Final     MCH   Date/Time Value Ref Range Status   03/31/2025 08:31 AM 31.2 27.0 - 33.0 pg Final     MCHC   Date/Time Value Ref Range Status   03/31/2025 08:31 AM 31.3 (L) 32.3 - 36.5 g/dL Final     MPV   Date/Time Value Ref Range Status   03/31/2025 08:31 AM 9.5 9.0 - 12.9 fL Final      Sodium   Date/Time Value Ref Range Status   03/31/2025 08:31  135 - 145 mmol/L Final     Potassium   Date/Time Value Ref Range Status   03/31/2025 08:31 AM 4.3 3.6 - 5.5 mmol/L Final     Chloride   Date/Time Value Ref Range Status   03/31/2025 08:31  96 - 112 mmol/L Final     Co2   Date/Time Value Ref Range Status   03/31/2025 08:31 AM 28 20 - 33 mmol/L Final     Glucose   Date/Time Value Ref Range Status   03/31/2025 08:31 AM 91 65 - 99 mg/dL Final     Bun   Date/Time Value Ref Range Status   03/31/2025 08:31 AM 15 8 - 22 mg/dL Final     Creatinine   Date/Time Value Ref Range Status   03/31/2025 08:31 AM 0.84 0.50 - 1.40 mg/dL Final     Bun-Creatinine Ratio   Date/Time Value Ref Range Status   01/13/2022 09:00 PM 12 9 - 20 Final     Alkaline Phosphatase   Date/Time Value Ref Range Status   03/28/2025 11:55  (H) 30 - 99 U/L Final     AST(SGOT)   Date/Time Value Ref Range Status   03/28/2025 11:55 AM 26 12 - 45 U/L Final     Comment:     The hemolysis index of the specimen exceeds the allowed tolerance for the  test. Result may be affected.?Specimen recollection is recommended to  confirm the result.       ALT(SGPT)   Date/Time Value Ref Range Status   03/28/2025 11:55 AM 20 2 - 50 U/L Final  "    Total Bilirubin   Date/Time Value Ref Range Status   03/28/2025 11:55 AM 0.8 0.1 - 1.5 mg/dL Final      No results found for: \"CPKTOTAL\"       No results found for: \"BLOODCULTU\", \"BLDCULT\", \"BCHOLD\"    No results found for: \"BLOODCULTU\", \"BLDCULT\", \"BCHOLD\"       FLUID CULTURE W/GRAM STAIN  Order: [id number] - Reflex for Order [id number]   Status: Edited Result - FINAL       Next appt: Today at 02:30 PM in Infectious Diseases (Feliberto Shell, A.P.R.N.)    Test Result Released: Yes (seen)    Specimen Information: Synovial   0 Result Notes      Component  Ref Range & Units (hover) 1 mo ago   Significant Indicator POS Positive (POS)   Source SYNO   Site right knee   Culture Result - Abnormal    Gram Stain Result Many WBCs.  No organisms seen.   Culture Result  Abnormal   Streptococcus agalactiae (Group B)  Light growth  This isolate does NOT demonstrate inducible Clindamycin  resistance in vitro.    Resulting Agency M        Susceptibility     Streptococcus agalactiae (group b)     JOHNIE     Daptomycin <=0.5 mcg/mL Sensitive     Penicillin <=0.03 mcg/mL Sensitive                 Specimen Collected: 03/24/25  7:30 AM Last Resulted: 03/26/25  7:40 AM   ntains abnormal data FLUID CULTURE W/GRAM STAIN  Order: [id number] - Reflex for Order [id number]   Status: Edited Result - FINAL       Next appt: Today at 02:30 PM in Infectious Diseases (Feliberto Shell, A.P.R.N.)    Test Result Released: Yes (not seen)    Specimen Information: Synovial   0 Result Notes      Component  Ref Range & Units (hover) 3 wk ago   Significant Indicator POS Positive (POS)   Source SYNO   Site Right Knee   Culture Result - Abnormal    Gram Stain Result Testing performed at:  [facility]  [address]  [place] [address]  Many WBCs.  Rare Gram positive cocci.   Culture Result  Abnormal   Streptococcus agalactiae (Group B)  Moderate growth  This isolate does NOT demonstrate inducible Clindamycin  resistance  in vitro.  "   Resulting Agency M        Susceptibility     Streptococcus agalactiae (group b)     JOHNIE     Daptomycin <=0.5 mcg/mL Sensitive     Penicillin <=0.03 mcg/mL Sensitive                  Narrative  Performed by: M  1 - synovial fluid   Specimen Collected: 03/28/25  3:18 PM Last Resulted: 03/31/25  4:39 PM       CULTURE TISSUE W/ GRM STAIN  Order: [id number] - Reflex for Order [id number]   Status: Final result       Next appt: Today at 02:30 PM in Infectious Diseases (Feliberto Shell, A.P.R.N.)    Test Result Released: Yes (not seen)    Specimen Information: Tissue   0 Result Notes      Component  Ref Range & Units (hover) 3 wk ago   Significant Indicator POS Positive (POS)   Source TISS   Site RIGHT KNEE 3   Culture Result - Abnormal    Gram Stain Result Few WBCs.  No organisms seen.   Culture Result  Abnormal   Streptococcus agalactiae (Group B)  Moderate growth  See previous culture for sensitivity report.    Resulting Agency M              Narrative  Performed by: M  4- RIGHT KNEE 3   Specimen Collected: 03/28/25  3:18 PM Last Resulted: 03/30/25  2:38 PM   DX-KNEE 2- RIGHT  Order: [id number]   Status: Final result       Next appt: Today at 02:30 PM in Infectious Diseases (Feliberto Shell, A.P.R.N.)    Test Result Released: Yes (not seen)    0 Result Notes  Details    Reading Physician Reading Date Result Priority   Michael Solis M.D.  [phone]     3/28/2025      Narrative & Impression     3/28/2025 6:04 PM     HISTORY/REASON FOR EXAM: .  Revision TKA     TECHNIQUE/EXAM DESCRIPTION AND NUMBER OF VIEWS:  2 views of the RIGHT knee.     COMPARISON: None     FINDINGS:  The patient is status post revision total knee arthroplasty with patellar resurfacing. Hardware is intact. There is no definite fracture or dislocation. Soft tissue and intra-articular gas is consistent with recent surgery.  There is a chronic, healed   fracture proximal fibula.     IMPRESSION:     Revision total knee arthroplasty with no definite acute  abnormality.        Exam Ended: 03/28/25  6:17 PM Last Resulted: 03/28/25  6:29 PM          05/27/25 08:25   Significant Indicator NEG  .   Site right knee  right knee   Source SYNO  SYNO     Impression/Assessment      1. Infection of total right knee replacement, initial encounter ([facility])  CBC WITH DIFFERENTIAL    Comp Metabolic Panel      2. Streptococcal infection  CBC WITH DIFFERENTIAL    Comp Metabolic Panel        Joshua Mckeon is a  65-year-old man with history of a right total knee arthroplasty in 2022 admitted on 3/28/2025 secondary to increasing pain and swelling.  Approximately 1 month prior to that admission, patient sustained an injury and his pain progressed.  He underwent joint aspiration on 3/24/2025 and fluid analysis was reportedly consistent with infection with cultures positive for Streptococcus agalactiae.  On 3/28/2025, he underwent revision of right total knee arthroplasty both components removed of the prior hardware and placement of antibiotic spacer.  The operative note significant purulent material was encountered upon entering the joint as well as extensive synovitis.  Operative cultures were also positive for Streptococcus agalactiae. The patient was discharged on IV cefazolin 6 g continuous infusion through 5/9/2025.     After patient undergo second stage procedure, will start a 3-month course of Augmentin or cephalexin to minimize risk of reinfection.    6/18/25- This evaluation was conducted via Teams using secure and encrypted videoconferencing technology. The patient was in their home in the [facility].    The patient's identity was confirmed and verbal consent was obtained for this virtual visit.  Patient following up after joint aspiration which has been negative to date.  Patient did report though he developed a UTI and is finishing his 10-day course of ciprofloxacin.  He has been tolerating ciprofloxacin with no complaints of side effects.  Today denies any nausea,  vomiting, fever, chills, constipation or diarrhea.  Overall feeling well.  Surgery was pushed back to 6/25/2025 due to his UTI.  Previous labs scanned in media tab from 5/2/2025 reviewed and discussed with patient.  Repeat lab work CBC/CMP sometime this week and again in 2 weeks after he starts Augmentin.  Patient is not to start Augmentin until after procedure will be on for the next 3 months in order to prevent reoccurrence of infection.  Recommend to start daily probiotic.  Follow-up with ID clinic in 2 weeks.  Okay for virtual visit    - This infection/ chronic illness posses a threat to life, bodily function, and limb preservation   PLAN:   - Complete 10-day course of ciprofloxacin for UTI.  - After second stage reimplantation we will start 3-month course of p.o. Augmentin 875/125 mg twice daily to prevent recurrence of infection.  End date 9/25/2025.  - Repeat labs CBC/CMP sometime this week and again in 2 weeks after he starts Augmentin.  Monitoring for side effects, medication toxicity, medication interactions, and for infection  - Medication education provided and S/S of side effects discussed   - Recommend routine follow up with orthopedic surgery.  Second stage reimplantation scheduled for 6/25/2025  - Continue wound care to right knee  - Recommended to start po probiotic      Return visit: 2 weeks, okay for virtual visit. Follow up with primary care physician for chronic medical problems    I have performed a physical exam,  updated ROS and plan today. I have reviewed previous images, labs, and provider notes.      SALBADOR Topete.R.N.    All Patients should seek medical re-evaluation or report to the ER for new, increasing or worsening symptoms. In some circumstances medical conditions can change from the initial evaluation and may require emergent medical re-evaluation. This includes but is not limited to chest pain, shortness of breath, atypical abdominal pain, atypical headache, ALOC, fever >101,  low blood pressure, high respiratory rate (above 30), low oxygen saturation (below 90%), acute delirium, abnormal bleeding, inability to tolerate any intake, weakness on one side of the body, any worsened or concerning conditions.    Please note that this dictation was created using voice recognition software. I have worked with technical experts from [facility] to optimize the interface.  I have made every reasonable attempt to correct obvious errors, but there may be errors of grammar and possibly content that I did not discover before finalizing the note.

## 2025-06-18 ENCOUNTER — TELEMEDICINE (OUTPATIENT)
Dept: INFECTIOUS DISEASES | Facility: MEDICAL CENTER | Age: 65
End: 2025-06-18
Attending: NURSE PRACTITIONER
Payer: COMMERCIAL

## 2025-06-18 DIAGNOSIS — T84.53XA INFECTION OF TOTAL RIGHT KNEE REPLACEMENT, INITIAL ENCOUNTER (HCC): Primary | ICD-10-CM

## 2025-06-18 DIAGNOSIS — A49.1 STREPTOCOCCAL INFECTION: ICD-10-CM

## 2025-06-18 PROCEDURE — 99213 OFFICE O/P EST LOW 20 MIN: CPT | Mod: 95 | Performed by: NURSE PRACTITIONER

## 2025-06-18 RX ORDER — CIPROFLOXACIN 500 MG/1
500 TABLET, FILM COATED ORAL 2 TIMES DAILY
COMMUNITY
Start: 2025-06-11

## 2025-07-02 ENCOUNTER — APPOINTMENT (OUTPATIENT)
Dept: INFECTIOUS DISEASES | Facility: MEDICAL CENTER | Age: 65
End: 2025-07-02
Attending: NURSE PRACTITIONER
Payer: COMMERCIAL

## 2025-07-04 ASSESSMENT — ENCOUNTER SYMPTOMS
ABDOMINAL PAIN: 0
BRUISES/BLEEDS EASILY: 0
VOMITING: 0
BLURRED VISION: 0
DIARRHEA: 0
CONSTIPATION: 0
EYE REDNESS: 0
EYE DISCHARGE: 0
MYALGIAS: 0
ROS SKIN COMMENTS: DENIES JAUNDICE
CHILLS: 0
SPUTUM PRODUCTION: 0
FOCAL WEAKNESS: 0
DOUBLE VISION: 0
NERVOUS/ANXIOUS: 0
EYE PAIN: 0
WHEEZING: 0
CLAUDICATION: 0
DIZZINESS: 0
FEVER: 0
PHOTOPHOBIA: 0
WEIGHT LOSS: 0
SHORTNESS OF BREATH: 0
HEADACHES: 0
NAUSEA: 0
PALPITATIONS: 0
COUGH: 0

## 2025-07-04 NOTE — PROGRESS NOTES
INFECTIOUS  DISEASE  OUTPATIENT CLINIC  NOTE   Subjective   Primary care provider: BRYAN Camara.     Reason for Follow Up:   Follow-up for   1. Infection of total right knee replacement, initial encounter ([facility])  CBC WITH DIFFERENTIAL    Comp Metabolic Panel      2. Streptococcal infection  CBC WITH DIFFERENTIAL    Comp Metabolic Panel        HPI: Patient previously seen and treated by ID team as inpatient during hospital admission.   Joshua Mckeon is a  65-year-old man with history of a right total knee arthroplasty in 2022 admitted on 3/28/2025 secondary to increasing pain and swelling.  Approximately 1 month prior to that admission, patient sustained an injury and his pain progressed.  He underwent joint aspiration on 3/24/2025 and fluid analysis was reportedly consistent with infection with cultures positive for Streptococcus agalactiae.  On 3/28/2025, he underwent revision of right total knee arthroplasty both components removed of the prior hardware and placement of antibiotic spacer.  The operative note significant purulent material was encountered upon entering the joint as well as extensive synovitis.  Operative cultures were also positive for Streptococcus agalactiae. The patient was discharged on IV cefazolin 6 g continuous infusion through 5/9/2025.     After patient undergo second stage procedure, will start a 3-month course of Augmentin or cephalexin to minimize risk of reinfection.    4/22/25-  Virtual visit with MD Ferrera.  Patient is doing well and tolerating IV cefazolin without any issues.  He denies any fever, nausea, abdominal pain or diarrhea.  He is now ambulating with a walker and has significant improvement in his pain.  He will follow-up with orthopedic surgeon on May 27. After patient undergoes second stage procedure, will plan a 3-month course of Augmentin 3 months to minimize risk of reinfection.  Prescription sent to his pharmacy in [place].  However patient and wife  understand that he should not start this antibiotic until after new hardware is placed. Patient will notify our office when he knows timing of his second stage procedure     4/23/25- Ciritcal lab results of ALT - 85. ALT - 61, , repeat labs ordered.  Sudden increase in LFTs, possibly lab error compared to previous AST/ALT     04/24/25- This evaluation was conducted via Teams using secure and encrypted videoconferencing technology. The patient was in their home in the [facility].    The patient's identity was confirmed and verbal consent was obtained for this virtual visit.  Today Patient reports feeling well. Pt stating that the wound is healing well.  Denies feeling generally ill, fevers/chills, general malaise, headache, n/v/d, abdominal pain, jaundice, yellowing of eyes..  Most recent lab results reviewed scanned in media tab from 4/22/25 WBC 5.5, stable anemia 11.5/35.0, transaminitis as discussed above.  There is a possibility that this was a bad lab draw as patient reports that nurse had difficulty obtaining blood.  Pending lab work was completed earlier this a.m. and is still pending.  Based upon repeat labs will determine if staying on IV antibiotic therapy versus transitioning to oral antibiotic therapy.  Patient only has 13 days left of treatment at this time which we could potentially transition to p.o. Zyvox 600 mg twice daily for the remaining course.  Zyvox education provided in the setting that if we potentially transition to oral antibiotic therapy.  Will need order to remove PICC line after completion of antibiotic therapy based upon labs.  Updated patient that if abnormal lab findings her team reach out to them but if normal labs continue with current IV antibiotic course.  Discussed plan of care with ID MD Rosas as most likely labs will result over weekend.     5/27/25-joint aspiration while off antibiotic therapy.  Negative to date, sed rate 4, CRP 5    6/2/25- AFTER second stage  "reimplantation will transition to 3 months of p.o. Augmentin 875/125 mg twice daily. Most recent aspiration was negative for infection. Do not start until AFTER second knee in replaced. Will send in more refills during visit on 6/18 if needed. Repeat Labs CBC/CMP 1 week after 2nd stage reimplantation. Ok to eat and drink for labs.     6/18/25- This evaluation was conducted via Teams using secure and encrypted videoconferencing technology. The patient was in their home in the [facility].    The patient's identity was confirmed and verbal consent was obtained for this virtual visit.  Patient following up after joint aspiration which has been negative to date.  Patient did report though he developed a UTI and is finishing his 10-day course of ciprofloxacin.  He has been tolerating ciprofloxacin with no complaints of side effects.  Today denies any nausea, vomiting, fever, chills, constipation or diarrhea.  Overall feeling well.  Surgery was pushed back to 6/25/2025 due to his UTI.  Previous labs scanned in media tab from 5/2/2025 reviewed and discussed with patient.  Repeat lab work CBC/CMP sometime this week and again in 2 weeks after he starts Augmentin.  Patient is not to start Augmentin until after procedure will be on for the next 3 months in order to prevent reoccurrence of infection.  Recommend to start daily probiotic.  Follow-up with ID clinic in 2 weeks.  Okay for virtual visit      6/25/25- 2nd stage reimplantation. Revision right total knee arthroplasty, all components, with Cori robotic assistance. Per OP Note \"no signes of infection. The prior implants were not grossly loose, but were easily removed without much bone loss\"     7/8/25- This evaluation was conducted via Teams using secure and encrypted videoconferencing technology. The patient was in their home in the [facility].    The patient's identity was confirmed and verbal consent was obtained for this virtual visit.  patient following up " following 3-month course of p.o. Augmentin 875/125 mg twice daily.  Patient states reimplantation discussed above with no signs of infection.  Most recent labs reviewed from 7/7/2025 scanned in the media tab, WBC mildly elevated 11.2,, mild anemia, hemoglobin 11.7/36.2, platelet count WNL, CMP and hyponatremia 128.  Repeat CMP tomorrow.  Patient has been increasing salt with diet since this a.m. but updated on low sodium.  Asymptomatic at this time for hyponatremia.  Recommend continued follow-up with PCP for further evaluation.  Reports that knee is healing appropriately with no drainage.  Mild swelling since surgery but overall doing well.  Most likely hyponatremia is due to high intake of water.  Recommended adding electrolytes with water.  Repeat labs CBC/CMP 2 weeks.  Okay for virtual visit in 2 weeks.  Pending follow-up with ID MD Mariee in 2 days per patient request.  Dr. Mariee at that time can discuss repeat lab work    Current Antimicrobials: 3-month course of p.o. Augmentin 875/125 mg twice daily, end date 9/25/2025  previous Antimicrobials: Cefazolin    Other Current Medications:  Home Medications   Medication Sig Taking? Last Dose Authorizing Provider   cetirizine (ZYRTEC) 10 MG Tab Take 10 mg by mouth every day. Yes  Physician Outpatient   losartan (COZAAR) 25 MG Tab Take 25 mg by mouth every day. Yes  Physician Outpatient   oxyCODONE immediate-release (ROXICODONE) 5 MG Tab Take 1 Tablet by mouth every four hours as needed for Severe Pain for up to 7 days. Yes  Cipriano Valdes M.D.   sodium chloride (SALT) 1 g Tab Take 1 Tablet by mouth 3 times a day for 10 days. Yes  BRYAN Topete   acetaminophen (TYLENOL) 500 MG Tab Take 2 Tablets by mouth every 8 hours. Yes  Cipriano Valdes M.D.   aspirin 81 MG EC tablet Take 1 Tablet by mouth 2 times a day for 30 days. Yes  Cipriano Valdes M.D.   docusate sodium (COLACE) 100 MG Cap Take 1 Capsule by mouth 2 times a day. Yes  Cipriano Valdes M.D.    celecoxib (CELEBREX) 200 MG Cap Take 1 Capsule by mouth every day. Yes  Cipriano Valdes M.D.   ciprofloxacin (CIPRO) 500 MG Tab Take 500 mg by mouth 2 times a day. Yes  Physician Outpatient   amoxicillin-clavulanate (AUGMENTIN) 875-125 MG Tab Take 1 Tablet by mouth 2 times a day. Yes  Oumou Martinze M.D.   tamsulosin (FLOMAX) 0.4 MG capsule Take 0.4 mg by mouth 2 times a day. Yes  Physician Outpatient   atorvastatin (LIPITOR) 40 MG Tab Take 1 Tablet by mouth every day. Yes  Physician Outpatient   dilTIAZem CD (CARDIZEM CD) 300 MG CAPSULE SR 24 HR Take  by mouth every day. Yes  Physician Outpatient   metoprolol (TOPROL XL) 200 MG XL tablet Take 1 Tablet by mouth 2 times a day. Yes  Physician Outpatient   Cholecalciferol (VITAMIN D-3 PO) Take  by mouth. Yes  Physician Outpatient   ECHINACEA PO Take  by mouth. Yes  Physician Outpatient   NON SPECIFIED Tart Cherry Extract, Swell no More Yes  Physician Outpatient   allopurinol (ZYLOPRIM) 300 MG Tab Take 300 mg by mouth every day. Yes  Physician Outpatient   tadalafil (CIALIS) 5 MG tablet Take 5 mg by mouth as needed for Erectile Dysfunction. Yes  Physician Outpatient   omeprazole (PRILOSEC) 20 MG delayed-release capsule Take 20 mg by mouth every day. Yes  Physician Outpatient        PMH:  Past Medical History:   Diagnosis Date    Arthritis 2017    Been treated for thsi    Cataract Both eyes    Has both eyes done for cataracts    Delayed emergence from general anesthesia 2019    His last knee surgery it took a bit for nate to wake from hsi surgery    Heart burn 2006    Being treated for this with Omeprazole    High cholesterol 2006    Being treated for this    Hypertension     Snoring Has been for a lot of years    Seems to be ok with this     Past Surgical History:   Procedure Laterality Date    PB REVISE KNEE JOINT REPLACE,ALL PARTS Right 3/28/2025    Procedure: REVISION RIGHT TOTAL KNEE ARTHROPLASTY, BOTH COMPONENTS, WITH PLACEMENT OF ARTICULATING ANTIBIOTIC  SPACER;  Surgeon: Cipriano Valdes M.D.;  Location: SURGERY [facility];  Service: Orthopedics    PB TOTAL KNEE ARTHROPLASTY  01/31/2022    Procedure: RIGHT TOTAL KNEE ARTHROPLASTY;  Surgeon: Lloyd Garcia M.D.;  Location: [facility];  Service: Orthopedics    LAKISHA BY LAPAROSCOPY  03/13/2018    Procedure: LAKISHA BY LAPAROSCOPY;  Surgeon: James Dill M.D.;  Location: SURGERY [facility];  Service: General    ERCP IN OR  03/12/2018    Procedure: ERCP IN OR;  Surgeon: Victorino Luis M.D.;  Location: SURGERY [facility];  Service: Gastroenterology    OTHER  2017    Gall bladder extraction     Family History   Problem Relation Age of Onset    Breast Cancer Mother         Is cured from her breast cancer 3x years    Heart Disease Father         Past aways from a heart attack att he age of 57    Hypertension Father     Hyperlipidemia Father     Heart Disease Paternal Uncle         Had triple bypass on his heart     Social History     Socioeconomic History    Marital status:      Spouse name: Not on file    Number of children: Not on file    Years of education: Not on file    Highest education level: Not on file   Occupational History    Not on file   Tobacco Use    Smoking status: Never    Smokeless tobacco: Current     Types: Chew    Tobacco comments:     Chews tobacco   Vaping Use    Vaping status: Never Used   Substance and Sexual Activity    Alcohol use: Yes     Alcohol/week: 1.8 - 2.4 oz     Types: 3 - 4 Standard drinks or equivalent per week     Comment: quit    Drug use: Never    Sexual activity: Not on file   Other Topics Concern    Not on file   Social History Narrative    Not on file     Social Drivers of Health     Financial Resource Strain: Not on file   Food Insecurity: Unknown (6/26/2025)    Received from [facility]    Unable to Assess     Is the patient able to answer the following questions today?: No   Transportation Needs: Unknown (6/26/2025)     "Received from [facility]    Unable to Assess     Is the patient able to answer the following questions today?: No   Physical Activity: Not on file   Stress: Not on file   Social Connections: Not on file   Intimate Partner Violence: Not At Risk (3/28/2025)    Humiliation, Afraid, Rape, and Kick questionnaire     Fear of Current or Ex-Partner: No     Emotionally Abused: No     Physically Abused: No     Sexually Abused: No   Housing Stability: Unknown (6/26/2025)    Received from [facility]    Unable to Assess     Is the patient able to answer the following questions today?: No           Allergies/Intolerances:  Allergies   Allergen Reactions    Lisinopril Swelling     \"throat started to close up\"       ROS:   Review of Systems   Constitutional:  Negative for chills, fever, malaise/fatigue and weight loss.   HENT:  Negative for congestion and hearing loss.    Eyes:  Negative for blurred vision, double vision, photophobia, pain, discharge and redness.   Respiratory:  Negative for cough, sputum production, shortness of breath and wheezing.    Cardiovascular:  Negative for chest pain, palpitations, claudication and leg swelling.   Gastrointestinal:  Negative for abdominal pain, constipation, diarrhea, nausea and vomiting.   Genitourinary:  Negative for dysuria, frequency, hematuria and urgency.   Musculoskeletal:  Positive for joint pain (Right knee, improving). Negative for myalgias.   Skin:  Negative for itching and rash.        Denies jaundice   Neurological:  Negative for dizziness, focal weakness and headaches.   Endo/Heme/Allergies:  Does not bruise/bleed easily.   Psychiatric/Behavioral:  The patient is not nervous/anxious.       ROS was reviewed and were negative except as above.    Objective    Most Recent Vital Signs:  /66   Ht 1.803 m (5' 11\")   Wt 111 kg (245 lb)   BMI 34.17 kg/m²     Physical Exam:  Physical Exam     Pertinent Lab/Imaging Results:  [unfilled]  @CMP@  WBC "   Date/Time Value Ref Range Status   03/31/2025 08:31 AM 11.3 (H) 4.8 - 10.8 K/uL Final     RBC   Date/Time Value Ref Range Status   06/26/2025 07:07 AM 3.61 (L) 4.50 - 5.70 x10e6/uL Final   03/31/2025 08:31 AM 3.37 (L) 4.70 - 6.10 M/uL Final     Hemoglobin   Date/Time Value Ref Range Status   06/26/2025 07:07 AM 10.9 (L) 13.0 - 16.7 g/dL Final   03/31/2025 08:31 AM 10.5 (L) 14.0 - 18.0 g/dL Final     Hematocrit   Date/Time Value Ref Range Status   06/26/2025 07:07 AM 33.6 (L) 38.8 - 49.7 % Final   03/31/2025 08:31 AM 33.6 (L) 42.0 - 52.0 % Final     MCV   Date/Time Value Ref Range Status   06/26/2025 07:07 AM 93.1 83.0 - 99.0 fL Final   03/31/2025 08:31 AM 99.7 (H) 81.4 - 97.8 fL Final     MCH   Date/Time Value Ref Range Status   06/26/2025 07:07 AM 30.3 28.0 - 33.8 pg Final   03/31/2025 08:31 AM 31.2 27.0 - 33.0 pg Final     MCHC   Date/Time Value Ref Range Status   06/26/2025 07:07 AM 32.5 (L) 33.1 - 36.5 g/dL Final   03/31/2025 08:31 AM 31.3 (L) 32.3 - 36.5 g/dL Final     MPV   Date/Time Value Ref Range Status   06/26/2025 07:07 AM 8 6.4 - 10.2 fL Final   03/31/2025 08:31 AM 9.5 9.0 - 12.9 fL Final      Sodium   Date/Time Value Ref Range Status   03/31/2025 08:31  135 - 145 mmol/L Final     Potassium   Date/Time Value Ref Range Status   03/31/2025 08:31 AM 4.3 3.6 - 5.5 mmol/L Final     Chloride   Date/Time Value Ref Range Status   03/31/2025 08:31  96 - 112 mmol/L Final     Co2   Date/Time Value Ref Range Status   03/31/2025 08:31 AM 28 20 - 33 mmol/L Final     Glucose   Date/Time Value Ref Range Status   03/31/2025 08:31 AM 91 65 - 99 mg/dL Final     Bun   Date/Time Value Ref Range Status   03/31/2025 08:31 AM 15 8 - 22 mg/dL Final     Creatinine   Date/Time Value Ref Range Status   03/31/2025 08:31 AM 0.84 0.50 - 1.40 mg/dL Final     Bun-Creatinine Ratio   Date/Time Value Ref Range Status   01/13/2022 09:00 PM 12 9 - 20 Final     Alkaline Phosphatase   Date/Time Value Ref Range Status   03/28/2025  "11:55  (H) 30 - 99 U/L Final     AST(SGOT)   Date/Time Value Ref Range Status   03/28/2025 11:55 AM 26 12 - 45 U/L Final     Comment:     The hemolysis index of the specimen exceeds the allowed tolerance for the  test. Result may be affected.?Specimen recollection is recommended to  confirm the result.       ALT(SGPT)   Date/Time Value Ref Range Status   03/28/2025 11:55 AM 20 2 - 50 U/L Final     Total Bilirubin   Date/Time Value Ref Range Status   03/28/2025 11:55 AM 0.8 0.1 - 1.5 mg/dL Final      No results found for: \"CPKTOTAL\"       No results found for: \"BLOODCULTU\", \"BLDCULT\", \"BCHOLD\"    No results found for: \"BLOODCULTU\", \"BLDCULT\", \"BCHOLD\"       FLUID CULTURE W/GRAM STAIN  Order: [id number] - Reflex for Order [id number]   Status: Edited Result - FINAL       Next appt: Today at 02:30 PM in Infectious Diseases (Feliberto Shell, A.P.R.N.)    Test Result Released: Yes (seen)    Specimen Information: Synovial   0 Result Notes      Component  Ref Range & Units (hover) 1 mo ago   Significant Indicator POS Positive (POS)   Source SYNO   Site right knee   Culture Result - Abnormal    Gram Stain Result Many WBCs.  No organisms seen.   Culture Result  Abnormal   Streptococcus agalactiae (Group B)  Light growth  This isolate does NOT demonstrate inducible Clindamycin  resistance in vitro.    Resulting Agency M        Susceptibility     Streptococcus agalactiae (group b)     JOHNIE     Daptomycin <=0.5 mcg/mL Sensitive     Penicillin <=0.03 mcg/mL Sensitive                 Specimen Collected: 03/24/25  7:30 AM Last Resulted: 03/26/25  7:40 AM   ntains abnormal data FLUID CULTURE W/GRAM STAIN  Order: [id number] - Reflex for Order [id number]   Status: Edited Result - FINAL       Next appt: Today at 02:30 PM in Infectious Diseases (Feliberto Shell, A.P.R.N.)    Test Result Released: Yes (not seen)    Specimen Information: Synovial   0 Result Notes      Component  Ref Range & Units (hover) 3 wk ago   Significant Indicator POS " Positive (POS)   Source SYNO   Site Right Knee   Culture Result - Abnormal    Gram Stain Result Testing performed at:  [facility]  [address]  [place] [id number]  Many WBCs.  Rare Gram positive cocci.   Culture Result  Abnormal   Streptococcus agalactiae (Group B)  Moderate growth  This isolate does NOT demonstrate inducible Clindamycin  resistance  in vitro.    Resulting Agency M        Susceptibility     Streptococcus agalactiae (group b)     JOHNIE     Daptomycin <=0.5 mcg/mL Sensitive     Penicillin <=0.03 mcg/mL Sensitive                  Narrative  Performed by: RENETTA  1 - synovial fluid   Specimen Collected: 03/28/25  3:18 PM Last Resulted: 03/31/25  4:39 PM       CULTURE TISSUE W/ GRM STAIN  Order: [id number] - Reflex for Order [id number]   Status: Final result       Next appt: Today at 02:30 PM in Infectious Diseases (Feliberto Mathews, A.P.R.N.)    Test Result Released: Yes (not seen)    Specimen Information: Tissue   0 Result Notes      Component  Ref Range & Units (hover) 3 wk ago   Significant Indicator POS Positive (POS)   Source TISS   Site RIGHT KNEE 3   Culture Result - Abnormal    Gram Stain Result Few WBCs.  No organisms seen.   Culture Result  Abnormal   Streptococcus agalactiae (Group B)  Moderate growth  See previous culture for sensitivity report.    Resulting Agency M              Narrative  Performed by: RENETTA  4- RIGHT KNEE 3   Specimen Collected: 03/28/25  3:18 PM Last Resulted: 03/30/25  2:38 PM   DX-KNEE 2- RIGHT  Order: [id number]   Status: Final result       Next appt: Today at 02:30 PM in Infectious Diseases (Feliberto Mathews, A.P.R.N.)    Test Result Released: Yes (not seen)    0 Result Notes  Details    Reading Physician Reading Date Result Priority   Michael Solis M.D.  [phone]     3/28/2025      Narrative & Impression     3/28/2025 6:04 PM     HISTORY/REASON FOR EXAM: .  Revision TKA     TECHNIQUE/EXAM DESCRIPTION AND NUMBER OF VIEWS:  2 views of the RIGHT knee.    "  COMPARISON: None     FINDINGS:  The patient is status post revision total knee arthroplasty with patellar resurfacing. Hardware is intact. There is no definite fracture or dislocation. Soft tissue and intra-articular gas is consistent with recent surgery.  There is a chronic, healed   fracture proximal fibula.     IMPRESSION:     Revision total knee arthroplasty with no definite acute abnormality.        Exam Ended: 03/28/25  6:17 PM Last Resulted: 03/28/25  6:29 PM          05/27/25 08:25   Significant Indicator NEG  .   Site right knee  right knee   Source SYNO  SYNO     Impression/Assessment      1. Infection of total right knee replacement, initial encounter ([facility])  CBC WITH DIFFERENTIAL    Comp Metabolic Panel      2. Streptococcal infection  CBC WITH DIFFERENTIAL    Comp Metabolic Panel      Joshua Mckeon is a  65-year-old man with history of a right total knee arthroplasty in 2022 admitted on 3/28/2025 secondary to increasing pain and swelling.  Approximately 1 month prior to that admission, patient sustained an injury and his pain progressed.  He underwent joint aspiration on 3/24/2025 and fluid analysis was reportedly consistent with infection with cultures positive for Streptococcus agalactiae.  On 3/28/2025, he underwent revision of right total knee arthroplasty both components removed of the prior hardware and placement of antibiotic spacer.  The operative note significant purulent material was encountered upon entering the joint as well as extensive synovitis.  Operative cultures were also positive for Streptococcus agalactiae. The patient was discharged on IV cefazolin 6 g continuous infusion through 5/9/2025. 6/25/25- 2nd stage reimplantation. Revision right total knee arthroplasty, all components, with Cori robotic assistance. Per OP Note \"no signes of infection. The prior implants were not grossly loose, but were easily removed without much bone loss\" 3 months of p.o. Augmentin 875/125 mg twice " daily to prevent recurrence of infection, end date 9/25/2025     - This infection/ chronic illness posses a threat to life, bodily function, and limb preservation   PLAN:   - 3-month course of p.o. Augmentin 875/125 mg twice daily to prevent recurrence of infection.  End date 9/25/2025.  - Repeat labs CBC/CMP in 2 weeks.  Monitoring for side effects, medication toxicity, medication interactions, and for infection  - Repeat CMP in 2 days for monitoring of hyponatremia.  Currently asymptomatic.  10-day course of p.o. salt tablets 1 g 3 times daily sent to pharmacy.  Patient to follow-up with PCP for further evaluation.  Most likely due to overhydration without electrolytes  - Medication education provided and S/S of side effects discussed   - Recommend routine follow up with orthopedic surgery.  Second stage reimplantation scheduled for 6/25/2025  - Continue wound care to right knee  - Recommended to start po probiotic      Return visit: 2 weeks, okay for virtual visit. Follow up with primary care physician for chronic medical problems    I have performed a physical exam,  updated ROS and plan today. I have reviewed previous images, labs, and provider notes.      TAYLOR Topete.    All Patients should seek medical re-evaluation or report to the ER for new, increasing or worsening symptoms. In some circumstances medical conditions can change from the initial evaluation and may require emergent medical re-evaluation. This includes but is not limited to chest pain, shortness of breath, atypical abdominal pain, atypical headache, ALOC, fever >101, low blood pressure, high respiratory rate (above 30), low oxygen saturation (below 90%), acute delirium, abnormal bleeding, inability to tolerate any intake, weakness on one side of the body, any worsened or concerning conditions.    Please note that this dictation was created using voice recognition software. I have worked with technical experts from [employer] to  optimize the interface.  I have made every reasonable attempt to correct obvious errors, but there may be errors of grammar and possibly content that I did not discover before finalizing the note.

## 2025-07-07 DIAGNOSIS — E87.1 HYPONATREMIA: Primary | ICD-10-CM

## 2025-07-07 RX ORDER — SODIUM CHLORIDE 1 G/1
1 TABLET ORAL 3 TIMES DAILY
Qty: 30 TABLET | Refills: 0 | Status: SHIPPED | OUTPATIENT
Start: 2025-07-07 | End: 2025-07-17

## 2025-07-08 ENCOUNTER — TELEMEDICINE (OUTPATIENT)
Dept: INFECTIOUS DISEASES | Facility: MEDICAL CENTER | Age: 65
End: 2025-07-08
Attending: NURSE PRACTITIONER
Payer: COMMERCIAL

## 2025-07-08 ENCOUNTER — TELEPHONE (OUTPATIENT)
Dept: INFECTIOUS DISEASES | Facility: MEDICAL CENTER | Age: 65
End: 2025-07-08

## 2025-07-08 VITALS
HEIGHT: 71 IN | DIASTOLIC BLOOD PRESSURE: 66 MMHG | WEIGHT: 245 LBS | BODY MASS INDEX: 34.3 KG/M2 | SYSTOLIC BLOOD PRESSURE: 123 MMHG

## 2025-07-08 DIAGNOSIS — T84.53XA INFECTION OF TOTAL RIGHT KNEE REPLACEMENT, INITIAL ENCOUNTER (HCC): Primary | ICD-10-CM

## 2025-07-08 DIAGNOSIS — A49.1 STREPTOCOCCAL INFECTION: ICD-10-CM

## 2025-07-08 PROCEDURE — 99213 OFFICE O/P EST LOW 20 MIN: CPT | Mod: 95 | Performed by: NURSE PRACTITIONER

## 2025-07-08 RX ORDER — CETIRIZINE HYDROCHLORIDE 10 MG/1
10 TABLET ORAL DAILY
COMMUNITY
Start: 2025-04-30

## 2025-07-08 RX ORDER — LOSARTAN POTASSIUM 25 MG/1
25 TABLET ORAL DAILY
COMMUNITY
Start: 2025-04-30

## 2025-07-08 ASSESSMENT — FIBROSIS 4 INDEX: FIB4 SCORE: 1.1

## 2025-07-08 NOTE — PROGRESS NOTES
Hyponatremia 128 on most recent labs scanned into media tab from 7/7/25. Pending phone call from [place] to patient.      10 day prescription of salt tablets to his pharmacy 1 gram three times a day. Repeat lab CMP in 2 days for close monitoring. OK to eat and drink for lab

## 2025-07-08 NOTE — TELEPHONE ENCOUNTER
TAYLOR Topete.  Tania Hdz, Med Ass't; Adelina Guillen, Med Ass't  Can you call this patient and let him know that his labs results came back showing low salt.  Hyponatremia signs and symptoms may include:  Nausea and vomiting, Headache, Confusion, Loss of energy, drowsiness and fatigue, Restlessness and irritability, Muscle weakness, spasms or cramps, Seizures. IF he experiences any of these symptoms he needs to go to the ER right away. If he is not experiencing any of these symptoms then he needs to increase his salt intake for the next 10 days. I will also send in a 10 day prescription of salt tablets to his pharmacy 1 gram three times a day for 10 days. Repeat lab CMP in 2 days for close monitoring. OK to eat and drink for lab. Recommend that he follow up with his PCP as well for monitoring

## 2025-07-10 ENCOUNTER — APPOINTMENT (OUTPATIENT)
Dept: INFECTIOUS DISEASES | Facility: MEDICAL CENTER | Age: 65
End: 2025-07-10
Attending: INTERNAL MEDICINE
Payer: COMMERCIAL

## 2025-07-17 ASSESSMENT — ENCOUNTER SYMPTOMS
DIARRHEA: 0
WEIGHT LOSS: 0
SHORTNESS OF BREATH: 0
CHILLS: 0
SPUTUM PRODUCTION: 0
BRUISES/BLEEDS EASILY: 0
PALPITATIONS: 0
EYE PAIN: 0
FOCAL WEAKNESS: 0
BLURRED VISION: 0
DOUBLE VISION: 0
ROS SKIN COMMENTS: DENIES JAUNDICE
PHOTOPHOBIA: 0
WHEEZING: 0
CONSTIPATION: 0
CLAUDICATION: 0
NAUSEA: 0
FEVER: 0
COUGH: 0
EYE DISCHARGE: 0
DIZZINESS: 0
HEADACHES: 0
VOMITING: 0
NERVOUS/ANXIOUS: 0
ABDOMINAL PAIN: 0
EYE REDNESS: 0
MYALGIAS: 0

## 2025-07-18 NOTE — PROGRESS NOTES
INFECTIOUS  DISEASE  OUTPATIENT CLINIC  NOTE   Subjective   Primary care provider: BRYAN Camara.     Reason for Follow Up:   Follow-up for   1. Infection of total right knee replacement, initial encounter (HCC)  amoxicillin-clavulanate (AUGMENTIN) 875-125 MG Tab      2. Streptococcal infection  amoxicillin-clavulanate (AUGMENTIN) 875-125 MG Tab        HPI: Patient previously seen and treated by ID team as inpatient during hospital admission.   Joshua Mckeon is a  65-year-old man with history of a right total knee arthroplasty in 2022 admitted on 3/28/2025 secondary to increasing pain and swelling.  Approximately 1 month prior to that admission, patient sustained an injury and his pain progressed.  He underwent joint aspiration on 3/24/2025 and fluid analysis was reportedly consistent with infection with cultures positive for Streptococcus agalactiae.  On 3/28/2025, he underwent revision of right total knee arthroplasty both components removed of the prior hardware and placement of antibiotic spacer.  The operative note significant purulent material was encountered upon entering the joint as well as extensive synovitis.  Operative cultures were also positive for Streptococcus agalactiae. The patient was discharged on IV cefazolin 6 g continuous infusion through 5/9/2025.     After patient undergo second stage procedure, will start a 3-month course of Augmentin or cephalexin to minimize risk of reinfection.    4/22/25-  Virtual visit with MD Ferrera.  Patient is doing well and tolerating IV cefazolin without any issues.  He denies any fever, nausea, abdominal pain or diarrhea.  He is now ambulating with a walker and has significant improvement in his pain.  He will follow-up with orthopedic surgeon on May 27. After patient undergoes second stage procedure, will plan a 3-month course of Augmentin 3 months to minimize risk of reinfection.  Prescription sent to his pharmacy in [place].  However patient and  wife understand that he should not start this antibiotic until after new hardware is placed. Patient will notify our office when he knows timing of his second stage procedure     4/23/25- Ciritcal lab results of ALT - 85. ALT - 61, , repeat labs ordered.  Sudden increase in LFTs, possibly lab error compared to previous AST/ALT     04/24/25- This evaluation was conducted via Teams using secure and encrypted videoconferencing technology. The patient was in their home in the [facility].    The patient's identity was confirmed and verbal consent was obtained for this virtual visit.  Today Patient reports feeling well. Pt stating that the wound is healing well.  Denies feeling generally ill, fevers/chills, general malaise, headache, n/v/d, abdominal pain, jaundice, yellowing of eyes..  Most recent lab results reviewed scanned in media tab from 4/22/25 WBC 5.5, stable anemia 11.5/35.0, transaminitis as discussed above.  There is a possibility that this was a bad lab draw as patient reports that nurse had difficulty obtaining blood.  Pending lab work was completed earlier this a.m. and is still pending.  Based upon repeat labs will determine if staying on IV antibiotic therapy versus transitioning to oral antibiotic therapy.  Patient only has 13 days left of treatment at this time which we could potentially transition to p.o. Zyvox 600 mg twice daily for the remaining course.  Zyvox education provided in the setting that if we potentially transition to oral antibiotic therapy.  Will need order to remove PICC line after completion of antibiotic therapy based upon labs.  Updated patient that if abnormal lab findings her team reach out to them but if normal labs continue with current IV antibiotic course.  Discussed plan of care with ID MD Rosas as most likely labs will result over weekend.     5/27/25-joint aspiration while off antibiotic therapy.  Negative to date, sed rate 4, CRP 5    6/2/25- AFTER second  "stage reimplantation will transition to 3 months of p.o. Augmentin 875/125 mg twice daily. Most recent aspiration was negative for infection. Do not start until AFTER second knee in replaced. Will send in more refills during visit on 6/18 if needed. Repeat Labs CBC/CMP 1 week after 2nd stage reimplantation. Ok to eat and drink for labs.     6/18/25- This evaluation was conducted via Teams using secure and encrypted videoconferencing technology. The patient was in their home in the [facility].    The patient's identity was confirmed and verbal consent was obtained for this virtual visit.  Patient following up after joint aspiration which has been negative to date.  Patient did report though he developed a UTI and is finishing his 10-day course of ciprofloxacin.  He has been tolerating ciprofloxacin with no complaints of side effects.  Today denies any nausea, vomiting, fever, chills, constipation or diarrhea.  Overall feeling well.  Surgery was pushed back to 6/25/2025 due to his UTI.  Previous labs scanned in media tab from 5/2/2025 reviewed and discussed with patient.  Repeat lab work CBC/CMP sometime this week and again in 2 weeks after he starts Augmentin.  Patient is not to start Augmentin until after procedure will be on for the next 3 months in order to prevent reoccurrence of infection.  Recommend to start daily probiotic.  Follow-up with ID clinic in 2 weeks.  Okay for virtual visit      6/25/25- 2nd stage reimplantation. Revision right total knee arthroplasty, all components, with Cori robotic assistance. Per OP Note \"no signes of infection. The prior implants were not grossly loose, but were easily removed without much bone loss\"     7/8/25- This evaluation was conducted via Teams using secure and encrypted videoconferencing technology. The patient was in their home in the [facility].    The patient's identity was confirmed and verbal consent was obtained for this virtual visit.  patient " following up following 3-month course of p.o. Augmentin 875/125 mg twice daily.  Patient states reimplantation discussed above with no signs of infection.  Most recent labs reviewed from 7/7/2025 scanned in the media tab, WBC mildly elevated 11.2,, mild anemia, hemoglobin 11.7/36.2, platelet count WNL, CMP and hyponatremia 128.  Repeat CMP tomorrow.  Patient has been increasing salt with diet since this a.m. but updated on low sodium.  Asymptomatic at this time for hyponatremia.  Recommend continued follow-up with PCP for further evaluation.  Reports that knee is healing appropriately with no drainage.  Mild swelling since surgery but overall doing well.  Most likely hyponatremia is due to high intake of water.  Recommended adding electrolytes with water.  Repeat labs CBC/CMP 2 weeks.  Okay for virtual visit in 2 weeks.  Pending follow-up with ID MD Mariee in 2 days per patient request.  Dr. Mariee at that time can discuss repeat lab work    7/22/25-patient following up while on Augmentin 875/125 mg twice daily.  He is tolerating with no complaints of side effects.  Today denies any nausea, vomiting, fever, chills, constipation or diarrhea.  Most recent labs reviewed from 7/21/2025 scanned into media tab, WBC 7.5, neutrophils WNL, mild anemia 11.2/35.2, platelets 222, sodium/potassium WNL, creatinine 1.05, GFR 80, AST/ALT WNL, alk phos 112.  No signs of medication toxicity or side effects.  Previous hyponatremia resolved.  Repeat labs CBC/CMP in 1 month.    Current Antimicrobials: 3-month course of p.o. Augmentin 875/125 mg twice daily, end date 9/25/2025  previous Antimicrobials: Cefazolin    Other Current Medications:  Home Medications   Medication Sig Taking? Last Dose Authorizing Provider   amoxicillin-clavulanate (AUGMENTIN) 875-125 MG Tab Take 1 Tablet by mouth 2 times a day. Yes  BRYAN Topete   cetirizine (ZYRTEC) 10 MG Tab Take 10 mg by mouth every day. Yes  Physician Outpatient   losartan  (COZAAR) 25 MG Tab Take 25 mg by mouth every day. Yes  Physician Outpatient   acetaminophen (TYLENOL) 500 MG Tab Take 2 Tablets by mouth every 8 hours. Yes  Cipriano Valdes M.D.   aspirin 81 MG EC tablet Take 1 Tablet by mouth 2 times a day for 30 days. Yes  Cipriano Valdes M.D.   docusate sodium (COLACE) 100 MG Cap Take 1 Capsule by mouth 2 times a day. Yes  Cipriano Valdes M.D.   celecoxib (CELEBREX) 200 MG Cap Take 1 Capsule by mouth every day. Yes  Cipriano Valdes M.D.   ciprofloxacin (CIPRO) 500 MG Tab Take 500 mg by mouth 2 times a day. Yes  Physician Outpatient   tamsulosin (FLOMAX) 0.4 MG capsule Take 0.4 mg by mouth 2 times a day. Yes  Physician Outpatient   atorvastatin (LIPITOR) 40 MG Tab Take 1 Tablet by mouth every day. Yes  Physician Outpatient   dilTIAZem CD (CARDIZEM CD) 300 MG CAPSULE SR 24 HR Take  by mouth every day. Yes  Physician Outpatient   metoprolol (TOPROL XL) 200 MG XL tablet Take 1 Tablet by mouth 2 times a day. Yes  Physician Outpatient   Cholecalciferol (VITAMIN D-3 PO) Take  by mouth. Yes  Physician Outpatient   ECHINACEA PO Take  by mouth. Yes  Physician Outpatient   NON SPECIFIED Tart Cherry Extract, Swell no More Yes  Physician Outpatient   allopurinol (ZYLOPRIM) 300 MG Tab Take 300 mg by mouth every day. Yes  Physician Outpatient   tadalafil (CIALIS) 5 MG tablet Take 5 mg by mouth as needed for Erectile Dysfunction. Yes  Physician Outpatient   omeprazole (PRILOSEC) 20 MG delayed-release capsule Take 20 mg by mouth every day. Yes  Physician Outpatient        PMH:  Past Medical History:   Diagnosis Date    Arthritis 2017    Been treated for thsi    Cataract Both eyes    Has both eyes done for cataracts    Delayed emergence from general anesthesia 2019    His last knee surgery it took a bit for nate to wake from hsi surgery    Heart burn 2006    Being treated for this with Omeprazole    High cholesterol 2006    Being treated for this    Hypertension     Snoring Has been for a lot of years     Seems to be ok with this     Past Surgical History:   Procedure Laterality Date    PB REVISE KNEE JOINT REPLACE,ALL PARTS Right 3/28/2025    Procedure: REVISION RIGHT TOTAL KNEE ARTHROPLASTY, BOTH COMPONENTS, WITH PLACEMENT OF ARTICULATING ANTIBIOTIC SPACER;  Surgeon: Cipriano Valdes M.D.;  Location: SURGERY [facility];  Service: Orthopedics    PB TOTAL KNEE ARTHROPLASTY  01/31/2022    Procedure: RIGHT TOTAL KNEE ARTHROPLASTY;  Surgeon: Lloyd Garcia M.D.;  Location: [facility] [place];  Service: Orthopedics    LAKISHA BY LAPAROSCOPY  03/13/2018    Procedure: LAKISHA BY LAPAROSCOPY;  Surgeon: James Dill M.D.;  Location: SURGERY [facility];  Service: General    ERCP IN OR  03/12/2018    Procedure: ERCP IN OR;  Surgeon: Victorino Luis M.D.;  Location: SURGERY [facility];  Service: Gastroenterology    OTHER  2017    Gall bladder extraction     Family History   Problem Relation Age of Onset    Breast Cancer Mother         Is cured from her breast cancer 3x years    Heart Disease Father         Past aways from a heart attack att he age of 57    Hypertension Father     Hyperlipidemia Father     Heart Disease Paternal Uncle         Had triple bypass on his heart     Social History     Socioeconomic History    Marital status:      Spouse name: Not on file    Number of children: Not on file    Years of education: Not on file    Highest education level: Not on file   Occupational History    Not on file   Tobacco Use    Smoking status: Never    Smokeless tobacco: Current     Types: Chew    Tobacco comments:     Chews tobacco   Vaping Use    Vaping status: Never Used   Substance and Sexual Activity    Alcohol use: Yes     Alcohol/week: 1.8 - 2.4 oz     Types: 3 - 4 Standard drinks or equivalent per week     Comment: quit    Drug use: Never    Sexual activity: Not on file   Other Topics Concern    Not on file   Social History Narrative    Not on file     Social Drivers of Health  "    Financial Resource Strain: Not on file   Food Insecurity: Unknown (6/26/2025)    Received from [facility]    Unable to Assess     Is the patient able to answer the following questions today?: No   Transportation Needs: Unknown (6/26/2025)    Received from [facility]    Unable to Assess     Is the patient able to answer the following questions today?: No   Physical Activity: Not on file   Stress: Not on file   Social Connections: Not on file   Intimate Partner Violence: Not At Risk (3/28/2025)    Humiliation, Afraid, Rape, and Kick questionnaire     Fear of Current or Ex-Partner: No     Emotionally Abused: No     Physically Abused: No     Sexually Abused: No   Housing Stability: Unknown (6/26/2025)    Received from [facility]    Unable to Assess     Is the patient able to answer the following questions today?: No           Allergies/Intolerances:  Allergies   Allergen Reactions    Lisinopril Swelling     \"throat started to close up\"       ROS:   Review of Systems   Constitutional:  Negative for chills, fever, malaise/fatigue and weight loss.   HENT:  Negative for congestion and hearing loss.    Eyes:  Negative for blurred vision, double vision, photophobia, pain, discharge and redness.   Respiratory:  Negative for cough, sputum production, shortness of breath and wheezing.    Cardiovascular:  Negative for chest pain, palpitations, claudication and leg swelling.   Gastrointestinal:  Negative for abdominal pain, constipation, diarrhea, nausea and vomiting.   Genitourinary:  Negative for dysuria, frequency, hematuria and urgency.   Musculoskeletal:  Positive for joint pain (Right knee, improving). Negative for myalgias.   Skin:  Negative for itching and rash.        Denies jaundice   Neurological:  Negative for dizziness, focal weakness and headaches.   Endo/Heme/Allergies:  Does not bruise/bleed easily.   Psychiatric/Behavioral:  The patient is not nervous/anxious.     " "  ROS was reviewed and were negative except as above.    Objective    Most Recent Vital Signs:  Ht 1.803 m (5' 11\")   Wt 111 kg (245 lb)   BMI 34.17 kg/m²     Physical Exam:  Physical Exam     Pertinent Lab/Imaging Results:  [unfilled]  @CMP@  WBC   Date/Time Value Ref Range Status   03/31/2025 08:31 AM 11.3 (H) 4.8 - 10.8 K/uL Final     RBC   Date/Time Value Ref Range Status   06/26/2025 07:07 AM 3.61 (L) 4.50 - 5.70 x10e6/uL Final   03/31/2025 08:31 AM 3.37 (L) 4.70 - 6.10 M/uL Final     Hemoglobin   Date/Time Value Ref Range Status   06/26/2025 07:07 AM 10.9 (L) 13.0 - 16.7 g/dL Final   03/31/2025 08:31 AM 10.5 (L) 14.0 - 18.0 g/dL Final     Hematocrit   Date/Time Value Ref Range Status   06/26/2025 07:07 AM 33.6 (L) 38.8 - 49.7 % Final   03/31/2025 08:31 AM 33.6 (L) 42.0 - 52.0 % Final     MCV   Date/Time Value Ref Range Status   06/26/2025 07:07 AM 93.1 83.0 - 99.0 fL Final   03/31/2025 08:31 AM 99.7 (H) 81.4 - 97.8 fL Final     MCH   Date/Time Value Ref Range Status   06/26/2025 07:07 AM 30.3 28.0 - 33.8 pg Final   03/31/2025 08:31 AM 31.2 27.0 - 33.0 pg Final     MCHC   Date/Time Value Ref Range Status   06/26/2025 07:07 AM 32.5 (L) 33.1 - 36.5 g/dL Final   03/31/2025 08:31 AM 31.3 (L) 32.3 - 36.5 g/dL Final     MPV   Date/Time Value Ref Range Status   06/26/2025 07:07 AM 8 6.4 - 10.2 fL Final   03/31/2025 08:31 AM 9.5 9.0 - 12.9 fL Final      Sodium   Date/Time Value Ref Range Status   03/31/2025 08:31  135 - 145 mmol/L Final     Potassium   Date/Time Value Ref Range Status   03/31/2025 08:31 AM 4.3 3.6 - 5.5 mmol/L Final     Chloride   Date/Time Value Ref Range Status   03/31/2025 08:31  96 - 112 mmol/L Final     Co2   Date/Time Value Ref Range Status   03/31/2025 08:31 AM 28 20 - 33 mmol/L Final     Glucose   Date/Time Value Ref Range Status   03/31/2025 08:31 AM 91 65 - 99 mg/dL Final     Bun   Date/Time Value Ref Range Status   03/31/2025 08:31 AM 15 8 - 22 mg/dL Final     Creatinine " "  Date/Time Value Ref Range Status   03/31/2025 08:31 AM 0.84 0.50 - 1.40 mg/dL Final     Bun-Creatinine Ratio   Date/Time Value Ref Range Status   01/13/2022 09:00 PM 12 9 - 20 Final     Alkaline Phosphatase   Date/Time Value Ref Range Status   03/28/2025 11:55  (H) 30 - 99 U/L Final     AST(SGOT)   Date/Time Value Ref Range Status   03/28/2025 11:55 AM 26 12 - 45 U/L Final     Comment:     The hemolysis index of the specimen exceeds the allowed tolerance for the  test. Result may be affected.?Specimen recollection is recommended to  confirm the result.       ALT(SGPT)   Date/Time Value Ref Range Status   03/28/2025 11:55 AM 20 2 - 50 U/L Final     Total Bilirubin   Date/Time Value Ref Range Status   03/28/2025 11:55 AM 0.8 0.1 - 1.5 mg/dL Final      No results found for: \"CPKTOTAL\"       No results found for: \"BLOODCULTU\", \"BLDCULT\", \"BCHOLD\"    No results found for: \"BLOODCULTU\", \"BLDCULT\", \"BCHOLD\"       FLUID CULTURE W/GRAM STAIN  Order: [id number] - Reflex for Order [id number]   Status: Edited Result - FINAL       Next appt: Today at 02:30 PM in Infectious Diseases (Feliberto Mathews A.P.R.N.)    Test Result Released: Yes (seen)    Specimen Information: Synovial   0 Result Notes      Component  Ref Range & Units (hover) 1 mo ago   Significant Indicator POS Positive (POS)   Source SYNO   Site right knee   Culture Result - Abnormal    Gram Stain Result Many WBCs.  No organisms seen.   Culture Result  Abnormal   Streptococcus agalactiae (Group B)  Light growth  This isolate does NOT demonstrate inducible Clindamycin  resistance in vitro.    Resulting Agency M        Susceptibility     Streptococcus agalactiae (group b)     JOHNIE     Daptomycin <=0.5 mcg/mL Sensitive     Penicillin <=0.03 mcg/mL Sensitive                 Specimen Collected: 03/24/25  7:30 AM Last Resulted: 03/26/25  7:40 AM   ntains abnormal data FLUID CULTURE W/GRAM STAIN  Order: [id number] - Reflex for Order [id number]   Status: Edited Result - " FINAL       Next appt: Today at 02:30 PM in Infectious Diseases (Feliberto Shell, A.P.R.N.)    Test Result Released: Yes (not seen)    Specimen Information: Synovial   0 Result Notes      Component  Ref Range & Units (hover) 3 wk ago   Significant Indicator POS Positive (POS)   Source SYNO   Site Right Knee   Culture Result - Abnormal    Gram Stain Result Testing performed at:  [facility]  [address] [place] [address]  Many WBCs.  Rare Gram positive cocci.   Culture Result  Abnormal   Streptococcus agalactiae (Group B)  Moderate growth  This isolate does NOT demonstrate inducible Clindamycin  resistance  in vitro.    Resulting Agency M        Susceptibility     Streptococcus agalactiae (group b)     JOHNIE     Daptomycin <=0.5 mcg/mL Sensitive     Penicillin <=0.03 mcg/mL Sensitive                  Narrative  Performed by: RENETTA  1 - synovial fluid   Specimen Collected: 03/28/25  3:18 PM Last Resulted: 03/31/25  4:39 PM       CULTURE TISSUE W/ GRM STAIN  Order: [id number] - Reflex for Order [id number]   Status: Final result       Next appt: Today at 02:30 PM in Infectious Diseases (Feliberto Shell, A.P.R.N.)    Test Result Released: Yes (not seen)    Specimen Information: Tissue   0 Result Notes      Component  Ref Range & Units (hover) 3 wk ago   Significant Indicator POS Positive (POS)   Source TISS   Site RIGHT KNEE 3   Culture Result - Abnormal    Gram Stain Result Few WBCs.  No organisms seen.   Culture Result  Abnormal   Streptococcus agalactiae (Group B)  Moderate growth  See previous culture for sensitivity report.    Resulting Agency M              Narrative  Performed by: RENETTA  4- RIGHT KNEE 3   Specimen Collected: 03/28/25  3:18 PM Last Resulted: 03/30/25  2:38 PM   DX-KNEE 2- RIGHT  Order: [id number]   Status: Final result       Next appt: Today at 02:30 PM in Infectious Diseases (Feliberto Shell, A.P.R.N.)    Test Result Released: Yes (not seen)    0 Result Notes  Details    Reading  Physician Reading Date Result Priority   Michael Solis M.D.  [phone]     3/28/2025      Narrative & Impression     3/28/2025 6:04 PM     HISTORY/REASON FOR EXAM: .  Revision TKA     TECHNIQUE/EXAM DESCRIPTION AND NUMBER OF VIEWS:  2 views of the RIGHT knee.     COMPARISON: None     FINDINGS:  The patient is status post revision total knee arthroplasty with patellar resurfacing. Hardware is intact. There is no definite fracture or dislocation. Soft tissue and intra-articular gas is consistent with recent surgery.  There is a chronic, healed   fracture proximal fibula.     IMPRESSION:     Revision total knee arthroplasty with no definite acute abnormality.        Exam Ended: 03/28/25  6:17 PM Last Resulted: 03/28/25  6:29 PM          05/27/25 08:25   Significant Indicator NEG  .   Site right knee  right knee   Source SYNO  SYNO     Impression/Assessment      1. Infection of total right knee replacement, initial encounter ([facility])  amoxicillin-clavulanate (AUGMENTIN) 875-125 MG Tab      2. Streptococcal infection  amoxicillin-clavulanate (AUGMENTIN) 875-125 MG Tab        Joshua Mckeon is a  65-year-old man with history of a right total knee arthroplasty in 2022 admitted on 3/28/2025 secondary to increasing pain and swelling.  Approximately 1 month prior to that admission, patient sustained an injury and his pain progressed.  He underwent joint aspiration on 3/24/2025 and fluid analysis was reportedly consistent with infection with cultures positive for Streptococcus agalactiae.  On 3/28/2025, he underwent revision of right total knee arthroplasty both components removed of the prior hardware and placement of antibiotic spacer.  The operative note significant purulent material was encountered upon entering the joint as well as extensive synovitis.  Operative cultures were also positive for Streptococcus agalactiae. The patient was discharged on IV cefazolin 6 g continuous infusion through 5/9/2025. 6/25/25- 2nd  "stage reimplantation. Revision right total knee arthroplasty, all components, with Cori robotic assistance. Per OP Note \"no signes of infection. The prior implants were not grossly loose, but were easily removed without much bone loss\" 3 months of p.o. Augmentin 875/125 mg twice daily to prevent recurrence of infection, end date 9/25/2025     - This infection/ chronic illness posses a threat to life, bodily function, and limb preservation   PLAN:   - 3-month course of p.o. Augmentin 875/125 mg twice daily to prevent recurrence of infection.  End date 9/25/2025.  Previously the patient left 1 bottle of Augmentin at [facility] so refill was sent   - Repeat labs CBC/CMP in 1 month.  Monitoring for side effects, medication toxicity, medication interactions, and for infection  - Medication education provided and S/S of side effects discussed   - Recommend routine follow up with orthopedic surgery/PCP  - Continue wound care to right knee  - Continue po probiotic      Return visit: 1 month. Updated ROS and plan today. I have reviewed previous images, labs, and provider notes.      TAYLOR Topete.    All Patients should seek medical re-evaluation or report to the ER for new, increasing or worsening symptoms. In some circumstances medical conditions can change from the initial evaluation and may require emergent medical re-evaluation. This includes but is not limited to chest pain, shortness of breath, atypical abdominal pain, atypical headache, ALOC, fever >101, low blood pressure, high respiratory rate (above 30), low oxygen saturation (below 90%), acute delirium, abnormal bleeding, inability to tolerate any intake, weakness on one side of the body, any worsened or concerning conditions.    Please note that this dictation was created using voice recognition software. I have worked with technical experts from [employer] to optimize the interface.  I have made every reasonable attempt to correct obvious errors, but " there may be errors of grammar and possibly content that I did not discover before finalizing the note.

## 2025-07-22 ENCOUNTER — TELEMEDICINE (OUTPATIENT)
Dept: INFECTIOUS DISEASES | Facility: MEDICAL CENTER | Age: 65
End: 2025-07-22
Attending: NURSE PRACTITIONER
Payer: COMMERCIAL

## 2025-07-22 VITALS — HEIGHT: 71 IN | WEIGHT: 245 LBS | BODY MASS INDEX: 34.3 KG/M2

## 2025-07-22 DIAGNOSIS — A49.1 STREPTOCOCCAL INFECTION: ICD-10-CM

## 2025-07-22 DIAGNOSIS — T84.53XA INFECTION OF TOTAL RIGHT KNEE REPLACEMENT, INITIAL ENCOUNTER (HCC): Primary | ICD-10-CM

## 2025-07-22 ASSESSMENT — FIBROSIS 4 INDEX: FIB4 SCORE: 1.1

## 2025-07-28 DIAGNOSIS — T84.53XA INFECTION OF TOTAL RIGHT KNEE REPLACEMENT, INITIAL ENCOUNTER (HCC): ICD-10-CM

## 2025-07-28 DIAGNOSIS — A49.1 STREPTOCOCCAL INFECTION: ICD-10-CM

## 2025-08-22 ASSESSMENT — ENCOUNTER SYMPTOMS
CHILLS: 0
ROS SKIN COMMENTS: DENIES JAUNDICE
FEVER: 0
WEIGHT LOSS: 0
CLAUDICATION: 0
SPUTUM PRODUCTION: 0
DIARRHEA: 0
NAUSEA: 0
VOMITING: 0
FOCAL WEAKNESS: 0
PHOTOPHOBIA: 0
HEADACHES: 0
SHORTNESS OF BREATH: 0
MYALGIAS: 0
BLURRED VISION: 0
WHEEZING: 0
NERVOUS/ANXIOUS: 0
ABDOMINAL PAIN: 0
EYE PAIN: 0
DIZZINESS: 0
EYE REDNESS: 0
CONSTIPATION: 0
DOUBLE VISION: 0
BRUISES/BLEEDS EASILY: 0
PALPITATIONS: 0
COUGH: 0
EYE DISCHARGE: 0

## 2025-08-25 DIAGNOSIS — T84.53XA INFECTION OF TOTAL RIGHT KNEE REPLACEMENT, INITIAL ENCOUNTER (HCC): ICD-10-CM

## 2025-08-25 DIAGNOSIS — A49.1 STREPTOCOCCAL INFECTION: ICD-10-CM

## 2025-08-26 ENCOUNTER — APPOINTMENT (OUTPATIENT)
Dept: INFECTIOUS DISEASES | Facility: MEDICAL CENTER | Age: 65
End: 2025-08-26
Attending: NURSE PRACTITIONER
Payer: COMMERCIAL

## 2025-08-26 DIAGNOSIS — T84.53XA INFECTION OF TOTAL RIGHT KNEE REPLACEMENT, INITIAL ENCOUNTER (HCC): Primary | ICD-10-CM

## 2025-08-26 DIAGNOSIS — A49.1 STREPTOCOCCAL INFECTION: ICD-10-CM

## (undated) DEVICE — CANNULA W/SEAL 5X100 Z-THRE - ADED KII (12/BX)

## (undated) DEVICE — CANISTER SUCTION RIGID RED 1500CC (40EA/CA)

## (undated) DEVICE — TOWEL STOP TIMEOUT SAFETY FLAG (40EA/CA)

## (undated) DEVICE — ELECTRODE 850 FOAM ADHESIVE - HYDROGEL RADIOTRNSPRNT (50/PK)

## (undated) DEVICE — CONTAINER SPECIMEN BAG OR - STERILE 4 OZ W/LID (100EA/CA)

## (undated) DEVICE — TUBING CLEARLINK DUO-VENT - C-FLO (48EA/CA)

## (undated) DEVICE — SET SUCTION/IRRIGATION WITH DISPOSABLE TIP (6/CA )PART #0250-070-520 IS A SUB

## (undated) DEVICE — PACK TOTAL KNEE (1/CA)

## (undated) DEVICE — NEPTUNE 4 PORT MANIFOLD - (20/PK)

## (undated) DEVICE — GLOVE BIOGEL PI ORTHO SZ 6 SURGICAL PF LF (40PR/BX)

## (undated) DEVICE — CHLORAPREP 26 ML APPLICATOR - ORANGE TINT(25/CA)

## (undated) DEVICE — PACK LAP CHOLE OR - (2EA/CA)

## (undated) DEVICE — HUMID-VENT HEAT AND MOISTURE EXCHANGE- (50/BX)

## (undated) DEVICE — BOVIE BLADE COATED &INSULATED - 25/PK

## (undated) DEVICE — SODIUM CHL IRRIGATION 0.9% 1000ML (12EA/CA)

## (undated) DEVICE — LACTATED RINGERS INJ 1000 ML - (14EA/CA 60CA/PF)

## (undated) DEVICE — TRUETOME JAG 44 PRELOADED SPHINTERTOME

## (undated) DEVICE — GLOVE BIOGEL SZ 6.5 SURGICAL PF LTX (50PR/BX 4BX/CA)

## (undated) DEVICE — DRAPE SRG LG BCK TBL DISP - 10/CA

## (undated) DEVICE — WATER IRRIG. STER. 1500 ML - (9/CA)

## (undated) DEVICE — TUBE E-T HI-LO CUFF 8.0MM (10EA/PK)

## (undated) DEVICE — SENSOR SPO2 NEO LNCS ADHESIVE (20/BX) SEE USER NOTES

## (undated) DEVICE — TUBE CONNECT SUCTION CLEAR 120 X 1/4" (50EA/CA)"

## (undated) DEVICE — PROTECTOR ULNA NERVE - (36PR/CA)

## (undated) DEVICE — KIT CUSTOM PROCEDURE SINGLE FOR ENDO  (15/CA)

## (undated) DEVICE — HEAD HOLDER JUNIOR/ADULT

## (undated) DEVICE — BANDAGE ROLL STERILE BULKEE 4.5 IN X 4 YD (100EA/CA)

## (undated) DEVICE — STOCKINET TUBULAR 6IN STERILE - 6 X 48YDS (25/CA)

## (undated) DEVICE — SUTURE GENERAL

## (undated) DEVICE — EXTRACTOR PRO XL 9-12 MM ABOVE

## (undated) DEVICE — DERMABOND ADVANCED - (12EA/BX)

## (undated) DEVICE — SET LEADWIRE 5 LEAD BEDSIDE DISPOSABLE ECG (1SET OF 5/EA)

## (undated) DEVICE — Device

## (undated) DEVICE — COVER LIGHT HANDLE FLEXIBLE - SOFT (2EA/PK 80PK/CA)

## (undated) DEVICE — SYSTEM NEGATIVE PRESSURE PREVENA PLUS PEEL PLACE L35CM (1EA)

## (undated) DEVICE — TIP INTPLS HFLO ML ORFC BTRY - (12/CS) FOR SURGILAV

## (undated) DEVICE — KIT ANESTHESIA W/CIRCUIT & 3/LT BAG W/FILTER (20EA/CA)

## (undated) DEVICE — DRAPESURG STERI-DRAPE LONG - (10/BX 4BX/CA)

## (undated) DEVICE — ELECTRODE DUAL RETURN W/ CORD - (50/PK)

## (undated) DEVICE — TUBING INSUFFLATION - (10/BX)

## (undated) DEVICE — CONTAINER, SPECIMEN, STERILE

## (undated) DEVICE — SPONGE GAUZE NON-STERILE 4X4 - (2000/CA 10PK/CA)

## (undated) DEVICE — GLOVE BIOGEL SZ 8 SURGICAL PF LTX - (50PR/BX 4BX/CA)

## (undated) DEVICE — SUCTION INSTRUMENT YANKAUER BULBOUS TIP W/O VENT (50EA/CA)

## (undated) DEVICE — SENSOR OXIMETER ADULT SPO2 RD SET (20EA/BX)

## (undated) DEVICE — HANDPIECE 10FT INTPLS SCT PLS IRRIGATION HAND CONTROL SET (6/PK)

## (undated) DEVICE — CLIP MED LG INTNL HRZN TI ESCP - (20/BX)

## (undated) DEVICE — MASK ANESTHESIA ADULT  - (100/CA)

## (undated) DEVICE — SET EXTENSION WITH 2 PORTS (48EA/CA) ***PART #2C8610 IS A SUBSTITUTE*****

## (undated) DEVICE — GOWN WARMING STANDARD FLEX - (30/CA)

## (undated) DEVICE — EXTRACTOR PRO XL 15-18 MM ABOVE

## (undated) DEVICE — NEEDLE SPINAL NON-SAFETY 18 GA X 3 IN (25EA/BX)

## (undated) DEVICE — DETERGENT RENUZYME PLUS 10 OZ PACKET (50/BX)

## (undated) DEVICE — CANISTER SUCTION 3000ML MECHANICAL FILTER AUTO SHUTOFF MEDI-VAC NONSTERILE LF DISP  (40EA/CA)

## (undated) DEVICE — NEEDLE INSFL 120MM 14GA VRRS - (20/BX)

## (undated) DEVICE — BITE BLOCK ADULT 60FR (100EA/CA)

## (undated) DEVICE — TROCAR Z THREAD 11 X 100 - BLADED (6/BX)

## (undated) DEVICE — BLADE RECIP 77.5 X 11.2 X .76MM (1/EA)

## (undated) DEVICE — LENS/HOOD FOR SPACESUIT - (32/PK) PEEL AWAY FACE

## (undated) DEVICE — TROCAR 5X100 NON BLADED Z-TH - READ KII (6/BX)

## (undated) DEVICE — GOWN SURGEONS LARGE - (32/CA)

## (undated) DEVICE — KIT ROOM DECONTAMINATION

## (undated) DEVICE — PAD PREP 24 X 48 CUFFED - (100/CA)

## (undated) DEVICE — GLOVE BIOGEL SZ 7 SURGICAL PF LTX - (50PR/BX 4BX/CA)

## (undated) DEVICE — BAG RETRIEVAL 10ML (10EA/BX)

## (undated) DEVICE — SUTURE 0 VICRYL PLUS UR-6 - 27 INCH (36/BX)

## (undated) DEVICE — SUTURE 4-0 VICRYL PLUSFS-1 - 27 INCH (36/BX)